# Patient Record
Sex: MALE | Race: WHITE | Employment: OTHER | URBAN - METROPOLITAN AREA
[De-identification: names, ages, dates, MRNs, and addresses within clinical notes are randomized per-mention and may not be internally consistent; named-entity substitution may affect disease eponyms.]

---

## 2020-10-28 ENCOUNTER — HOSPITAL ENCOUNTER (OUTPATIENT)
Dept: RADIATION ONCOLOGY | Age: 75
Discharge: HOME | End: 2020-10-28
Attending: RADIOLOGY
Payer: MEDICARE

## 2020-10-28 PROBLEM — N40.2 PROSTATE NODULE: Status: ACTIVE | Noted: 2020-10-28

## 2020-10-28 PROBLEM — R97.20 ELEVATED PSA: Status: ACTIVE | Noted: 2020-10-28

## 2021-01-13 ENCOUNTER — HOSPITAL ENCOUNTER (OUTPATIENT)
Dept: RADIATION ONCOLOGY | Age: 76
Discharge: HOME | End: 2021-01-13
Attending: RADIOLOGY
Payer: MEDICARE

## 2021-01-13 VITALS
HEIGHT: 65 IN | TEMPERATURE: 97.1 F | BODY MASS INDEX: 42.15 KG/M2 | OXYGEN SATURATION: 98 % | SYSTOLIC BLOOD PRESSURE: 148 MMHG | WEIGHT: 253 LBS | HEART RATE: 60 BPM | RESPIRATION RATE: 18 BRPM | DIASTOLIC BLOOD PRESSURE: 73 MMHG

## 2021-01-13 DIAGNOSIS — C61 PROSTATE CANCER (HCC): ICD-10-CM

## 2021-01-13 PROCEDURE — 99212 OFFICE O/P EST SF 10 MIN: CPT | Performed by: RADIOLOGY

## 2021-01-13 RX ORDER — AMLODIPINE BESYLATE 5 MG/1
5 TABLET ORAL DAILY
COMMUNITY

## 2021-01-13 RX ORDER — TAMSULOSIN HYDROCHLORIDE 0.4 MG/1
0.4 CAPSULE ORAL DAILY
COMMUNITY

## 2021-01-13 RX ORDER — LOSARTAN POTASSIUM 50 MG/1
50 TABLET ORAL 2 TIMES DAILY
COMMUNITY

## 2021-01-13 RX ORDER — NITROGLYCERIN 0.4 MG/1
0.4 TABLET SUBLINGUAL EVERY 5 MIN PRN
COMMUNITY

## 2021-01-13 RX ORDER — LEVOTHYROXINE SODIUM 0.05 MG/1
50 TABLET ORAL DAILY
COMMUNITY

## 2021-01-13 RX ORDER — PRAVASTATIN SODIUM 40 MG
40 TABLET ORAL NIGHTLY
COMMUNITY

## 2021-01-13 RX ORDER — ASPIRIN 81 MG/1
81 TABLET ORAL DAILY
COMMUNITY

## 2021-01-13 NOTE — CONSULTS
NURSING ASSESSMENT     Date: 1/13/2021        Patient Name: Artie Ness     YOB: 1945      Age:  77 y.o. MRN: [id number]     Chaperone [x] Yes   [] No      Advance Directives:   Do you currently have completed advance directives (living will)? [x] Yes   [] No         *If yes, please bring us a copy for your records. *If no, would you like info or assistance in completing advance directives (living will)? [] Yes   [x] No    Pain Score:   Pain Score (1-10): 2  Pain Location: low back pain  Pain Duration: intermittent   Pain Management/Control: None      Is pain affecting your ability to take care of yourself or move throughout your home? [] Yes   [x] No    General: Fatigue  Patient has gained weight [] Yes   [x] No  Patient has lost weight [] Yes   [x] No  How much weight in pounds and over what length of time:     Eyes (Ophthalmic):Wears glasses     Skin (Dermatological):Dermatitis      ENT: Occasionally \"chokes\" on water     Respiratory:Occasional SOB     Cardiovascular: Palpitations, Edema and Cardiac Device      Device   [x] Yes   [] No   Copy of Card Obtained [x] Yes   [] No    Gastrointestinal: Constipation on occasion. Diarrhea when axious    Genito-Urinary:   See IPSS shee    Breast: No Problems     Musculoskeletal: Joint Pain and Back Pain    Neurological:Finger and hand numbness at night occasional. OCD      Hematological and Lymphatic: No Problems     Endocrine: Thyroid Problems        A 10-point review of systems has been conducted and pertinent positives have been   recorded.  All other review of systems are negative        Additional Comments:

## 2021-01-13 NOTE — H&P
after the consult. He expressed no such thoughts to me, and appeared to be in good spirits. No concrete plans. Past Medical History:   Diagnosis Date    [facility] [address]     CAD (coronary artery disease)     Cancer ([facility] [address]     prostate    Hyperlipidemia     Hypertension     Hypothyroidism     Sleep apnea        Past Surgical History:   Procedure Laterality Date    COLONOSCOPY      CORONARY ANGIOPLASTY WITH STENT PLACEMENT      EYE SURGERY      Bilat cataract removal and implants. Retina tear. Laser surgery    HERNIA REPAIR      \"Double\" inguinal    PACEMAKER INSERTION  09/11/2011    Replacement 7/2020    POLYPECTOMY      SKIN CANCER EXCISION         Prior to Admission medications    Medication Sig Start Date End Date Taking? Authorizing Provider   levothyroxine (SYNTHROID) 50 MCG tablet Take 50 mcg by mouth Daily   Yes Historical Provider, MD   metoprolol tartrate (LOPRESSOR) 25 MG tablet Take 25 mg by mouth 2 times daily   Yes Historical Provider, MD   losartan (COZAAR) 50 MG tablet Take 50 mg by mouth 2 times daily   Yes Historical Provider, MD   pravastatin (PRAVACHOL) 40 MG tablet Take 40 mg by mouth nightly   Yes Historical Provider, MD   tamsulosin (FLOMAX) 0.4 MG capsule Take 0.4 mg by mouth daily   Yes Historical Provider, MD   aspirin 81 MG EC tablet Take 81 mg by mouth daily   Yes Historical Provider, MD   amLODIPine (NORVASC) 5 MG tablet Take 5 mg by mouth daily   Yes Historical Provider, MD   nitroGLYCERIN (NITROSTAT) 0.4 MG SL tablet Place 0.4 mg under the tongue every 5 minutes as needed for Chest pain up to max of 3 total doses. If no relief after 1 dose, call 911.     Historical Provider, MD       Allergies   Allergen Reactions    Pine Tar     Seasonal     Dye [Barium-Containing Compounds] Rash       Social History     Tobacco Use   Smoking Status Former Smoker   Smokeless Tobacco Never Used     Social History     Substance and Sexual Activity   Alcohol Use Not Currently History reviewed. No pertinent family history. Review Of Systems:   Pain Score:   Pain Score (1-10): 2  Pain Location: low back pain  Pain Duration: intermittent   Pain Management/Control: None     Is pain affecting your ability to take care of yourself or move throughout your home? []? Yes   [x]? No  General: Fatigue  Patient has gained weight []? Yes   [x]? No  Patient has lost weight []? Yes   [x]? No  How much weight in pounds and over what length of time:    Eyes (Ophthalmic):Wears glasses              Skin (Dermatological):Dermatitis               ENT: Occasionally \"chokes\" on water              Respiratory:Occasional SOB              Cardiovascular: Palpitations, Edema and Cardiac Device                          Device   [x]? Yes   []? No              Copy of Card Obtained [x]? Yes   []? No   Gastrointestinal: Constipation on occasion. Diarrhea when axious   Genito-Urinary:              IPSS = 15, with nocturia x 2, symptoms of incomplete emptying and weak stream.  No dysuria or hematuria. He is not happy with his level of urinary symptoms.  No history of UTI or prostatitis. Breast: No Problems              Musculoskeletal: Joint Pain and Back Pain, longstanding low back pain, attributed to arthritis  Neurological:Finger and hand numbness at night occasional. OCD                          Hematological and Lymphatic: No Problems              Endocrine: Thyroid Problems  A 10-point review of systems has been conducted and pertinent positives have been   recorded. All other review of systems are negative. Physical Exam:  Vitals:    01/13/21 1057   BP: (!) 148/73   Pulse: 60   Resp: 18   Temp: 97.1 °F (36.2 °C)   SpO2: 98%   Weight: 253 lb (114.8 kg)   Height: 5' 5\" (1.651 m)     ECOG PS: 0  GENERAL: NAD, appears stated age. Alert and oriented, appears stated age. HEENT: PERRLA, EOMI. Oral cavity WNL, no visible lesion, normal palate elevation.    NECK: No palpable cervical or supraclavicular adenopathy. RESPIRATORY/LUNGS: Clear to auscultation. No rib or spine tenderness. In particular the posterior ribs, T12, and L5 were all nontender. No CVA tenderness. CARDIOVASCULAR/HEART: Distant regular rate and rhythm. No audible murmur. Normal palpable pulses. ABDOMEN/GASTROINTESTINAL: Soft, nontender, nondistended, normal bowel sounds. No organomegaly. No surgical scars. EXTREMETIES: No cyanosis, clubbing, or +1 pretibial edema  NEUROLOGICAL: No focal deficit, normal gait, Romberg negative. RECTAL: Actively small prostate, 2.5 cm in width, palpable right apical nodule. T2c by MRI. No other rectal mass, no blood in examination glove, normal sphincter tone. Assessment/Plan:  67-year-old male with high risk prostate cancer, with initial PSA of 7.9, Nan score 4+4=8 without PNI, involving 8/13 cores, with most of the positive biopsies coming from MRI directed fusion biopsies. Clinical stage is [id number], as the work-up remains incomplete. The CT and bone scans show some suspicion for metastatic disease, which given the rapid doubling rate of the PSA and the high Nan score is definitely a possibility. I have ordered an [address]68 [facility] PET/CT to be done at [facility] to assess these lesions. I will see the patient back after the scan. With the expectation that the imaging will be negative, we discussed external beam radiotherapy plus or minus seed implant brachytherapy. The patient's urinary symptoms are such that I would not want to do the implant prior to external beam RT, unless his symptoms improved considerably on androgen ablation. Should the scan come back negative, we will place the patient on Casodex and Lupron and then evaluate him several months later for consideration of a brachytherapy boost.  In the context of brachytherapy we discussed radiation safety issues and the logistics of the procedure, as well as risks and benefits of external beam RT and brachytherapy. Intent of treatment, potential risks benefits and side effects reviewed today. Thank you for this consult and allowing us to participate in the care of this patient. Document produced with the aid of voice recognition software, and errors generated by such software may not all have been detected and corrected.   Electronically signed by Brad Minaya MD on 1/13/21 at 1:40 PM EST

## 2021-01-13 NOTE — PROGRESS NOTES
POLY referred to speak to Pt via RN Aguilar due to high distress score and statement of suicidal thoughts he experienced last evening; making this comment while sis-in-law, Farrah Nieves was present during RN assessment. Pt resides alone in a senior living apt. Complex in [place]. He is here with his sis-in-law, Farrah Nieves, who supports and accompanies him to his medical ramsey'ts. Farrah Nieves and Pt's brother (Elisa's spouse) are his primary support family. He has two adult daughters (one resides in an adult care home due to brain damage at birth. The other daughter lives locally with her family with minimal contacts with Pt). He states had \"visualized stabbing himself with a knife\" last evening; stating had been feeling \"depressed\" and \"the thought just crossed my mind\". He stated this thought was \"fleeting\"; moments later felt fine. He states he \"could never\" kill himself, stating he is \"basically a coward\". He states had and has no plan to commit suicide. He states had been a [employer] client from his early twenties until approx age of 62. He states it \"always felt good to talk to someone\"; however when medications were suggested for his \"OCD or depression\" issues, he declined, feeling the counseling sessions were more productive. POLY suggested a reconnection with the [employer] or a counseling service; he stated did not want to proceed with this contact at this time. POLY offered the counseling services of co-worker, Vinod Ingram; stating her expertise/credentials with mental health issues, to which he is agreeable. As pt does not have a working phone at this time (hopes to remedy this situation soon by changing phone carriers) he has agreed to contact Anatricardo Patel when he is at brother's home where he can place the call. POLY provided Pt with POLY Gonzalez contact information and work hours. POLY provided Pt with [address].  At end of this 1:1 session, when he rejoined his  Sis-in-law, POLY informed Chay De La Fuente of Crisis Hotline #' provided and Pt's intent to connect with Rosa Tello via phone from her home. Pt states his neighbors are supportive and he does feel he can call his brother's home for support at any time. No further needs presently.

## 2021-01-14 NOTE — PROGRESS NOTES
well as [facility] satellite contact for [facility] [employer]). Pt stated much appreciation for these resources. No further needs ast this time.

## 2021-01-15 ENCOUNTER — SOCIAL WORK (OUTPATIENT)
Dept: RADIATION ONCOLOGY | Age: 76
End: 2021-01-15

## 2021-01-15 NOTE — PROGRESS NOTES
Pt was seen by SW today, when he arrived at [facility] unscheduled, and asked to speak with SW about a billing concern regarding his surgery. As SW advised him how to proceed with this, pt spontaneously shared that he is feeling depressed and alone on the weekends, \"they are very dark,\" and he is feeling more isolated since the Covid-19 pandemic has limited social opportunities within his apartment complex. He further stated that since sharing with staff on 1/13/21 that he has pictured himself pushing a  knife through his chest, he has been entertaining thoughts that \"I never thought I could do it, but now I think, hmmm, maybe I could. \"  It is noteworthy that pt decided to come in to the cancer center, before a long weekend, to share these concerns with this SW, stating, \"I know whenever you tell a mental health person something like this, they get worried. \"  He also shared a story of having made similar comments years ago at another physician's office, \"and they wouldn't let me go either. \"  Pt seems aware of the consequences of such statements. These facts are concerning in that pt seems to be attempting to alert staff that he feels at risk, and this may be a veiled attempt to ask for help. SW related these concerns to pt and informed that he would need to be evaluated further before leaving today. Pt emphatically stated that he does not wish to go the the ED, and he made clear that he would not go willingly. As such, SW informed that the [facility] unit would be used. Again, pt wished to decline, but SW informed him that police might be called to best protect his safety until he could have a full MH assessment, and pt then willingly agreed to stay for evaluation. SW contacted [employer] unit and informed them of above mentioned risk factors. They agreed that assessment was warranted and SAL Ya conducted the assessment via tele-health. Additionally, as pt has a long history with [place], she also conducted a chart review of past records. He reportedly has no past history of psychiatric hospitalization nor previous suicidal attempts. Pt verbally consented to the assessment and he verbally consented to have this SW present, as [facility] would be to have him on a 1:1 precaution. Pt presented as open and honest during the interview. He candidly spoke of suicidal ideals, but maintained that prohibitions for the act were too strong. He discussed a strong jackson and daily prayer, which would not allow him to hurt himself, and he also discussed from past knowledge of suicides how much loved ones are harmed by the act. He maintains a future orientation, being able to cite short and long term goals, and even maintained his commitment to engaging in supportive therapy with this SW as scheduled next week. He has a detailed plan of participating in his cancer treatment (self-preservation behavior) and care, and he was even able to express some hopeful thoughts about his cancer diagnosis. He also seems motivated to seek help when needed, and he is well aware of crisis hotlines which he may use.

## 2021-01-22 ENCOUNTER — HOSPITAL ENCOUNTER (OUTPATIENT)
Dept: RADIATION ONCOLOGY | Age: 76
Discharge: HOME | End: 2021-01-22
Attending: RADIOLOGY
Payer: MEDICARE

## 2021-01-22 VITALS
WEIGHT: 251.8 LBS | BODY MASS INDEX: 41.9 KG/M2 | RESPIRATION RATE: 18 BRPM | DIASTOLIC BLOOD PRESSURE: 79 MMHG | OXYGEN SATURATION: 98 % | TEMPERATURE: 97.4 F | HEART RATE: 60 BPM | SYSTOLIC BLOOD PRESSURE: 164 MMHG

## 2021-01-22 DIAGNOSIS — C61 PROSTATE CANCER (HCC): Primary | ICD-10-CM

## 2021-01-22 PROCEDURE — 99212 OFFICE O/P EST SF 10 MIN: CPT | Performed by: RADIOLOGY

## 2021-01-22 RX ORDER — BICALUTAMIDE 50 MG/1
50 TABLET, FILM COATED ORAL DAILY
Qty: 120 TABLET | Refills: 0 | Status: SHIPPED | OUTPATIENT
Start: 2021-01-22 | End: 2021-05-22

## 2021-01-22 NOTE — PROGRESS NOTES
SW met with pt today as scheduled following his unannounced visit and psych evaluation with this SW and subsequently, the [facility], last week. He presented today as more engaging and somewhat less anxious, despite knowledge that he will be receiving PET scan results from Dr. Eve Amador today. Pt stated he did contact the [facility] last weekend, to check in with them as they requested, and he has since received a letter from them offering services. Discussed with him the benefit of seeking this support, and the value of being engaged with the [facility] in general.  Pt states he will think it over, but he may prefer to receive support more geared with his cancer treatment, so he will consider options. Sadly, pt said he has learned that his developmentally disabled daughter has tested positive for COVID and because of the severity of her disability, he feels very worried for her well-being. He is saddened by the fact that he has been unable to see her for the past year because of COVID restrictions at her facility. He is hoping to be able to visit her again in the future. Pt did report having a peaceful weekend, last weekend. He watched football as planned, and went over to visit his brother, which was helpful. Additionally, he reports that he seems to have some resolution on his most recent [facility] bill, it may have been filed under the wrong Medicare, so he is hoping that his bill will be covered. Lastly, pt had his younger daughter, Joy Dang, come later into the appointment, and she was planning to attend the visit with Dr. Eve Amador. Pt is clearly very open in his relationship with Joy Dang, as she was well aware of our meeting last week, and she is highly encouraging of her father to seek support. Finally, following his visit with Dr. Eve Amador, pt and Joy Dang, met briefly with POLY.   They were grateful to have received news that there was no evidence of metastasis on the PET scan, and pt seems well aware and willing to pursue his treatment plan forward. He stated he would like to meet with this SW again, and appointment was set for February 5, 2021. Pt invited to contact SW should he have need sooner.

## 2021-01-22 NOTE — PROGRESS NOTES
NURSING ASSESSMENT     Date: 1/22/2021        Patient Name: Ari Neil     YOB: 1945      Age:  77 y.o. MRN: [id number]     Chaperone [x] Yes   [] No      Advance Directives:  Do you currently have completed advance directives (living will)? [x] Yes   [] No         *If yes, please bring us a copy for your records. *If no, would you like info or assistance in completing advance directives (living will)? [] Yes   [x] No    Pain Score:   Pain Score (1-10): 2  Pain Location:Lower back   Pain Duration: Chronic  Pain Management/Control: May take Tylenol as needed. Is pain affecting your ability to take care of yourself or move throughout your home? [] Yes   [x] No    General: No Problems  Patient has gained weight [] Yes   [x] No  Patient has lost weight [] Yes   [x] No  How much weight in pounds and over what length of time:     Eyes (Ophthalmic):Wears glasses     Skin (Dermatological): dermatitis     ENT: Occasionally \"chokes\" on water     Respiratory: Occasional SOB     Cardiovascular: Palpitations, Cardiac Device      Device   [x] Yes   [] No   Copy of Card Obtained [x] Yes   [] No    Gastrointestinal: Constipation on occasion. Diarrhea on occasion when anxious    Genito-Urinary: See recent IPPS sheet       Breast: No Problems     Musculoskeletal: Joint Pain and Back Pain    Neurological: Finger and hand numbness at night occasionally. OCD      Hematological and Lymphatic: No Problems     Endocrine: Thyroid Problems        A 10-point review of systems has been conducted and pertinent positives have been   recorded. All other review of systems are negative    Was the patient admitted during the course of treatment OR within 30 days of treatment?  No        Additional Comments:

## 2021-01-22 NOTE — ONCOLOGY
RADIATION ONCOLOGY FOLLOW-UP    DATE OF VISIT: 2021    DIAGNOSIS & STAGIN-year-old male with high risk prostate cancer, with initial PSA of 7.9, Nan score 4+4=8 without PNI, involving 8/13 cores, with most of the positive biopsies coming from MRI directed fusion biopsies. Clinical stage is T2cN0. PREVIOUS TREATMENT: none    TIME SINCE TREATMENT: N/A    INTERVAL HISTORY: Due to the patient's equivocal CT and bone scan imaging, I ordered an Axumin PET/CT, done at [facility]. I have seen the imaging reports. This shows no evidence of metastases, with activity involving the right prostatic mid gland and apex. This agrees with the positive high-grade biopsies. I informed the patient of these results. His preference is to proceed with treatment that we discussed previously, which includes androgen ablation, seed implant brachytherapy boost, and external beam RT. He has not yet started androgen ablation. PAST MEDICAL HISTORY:   Past Medical History:   Diagnosis Date    [facility] [address]     CAD (coronary artery disease)     Cancer ([facility] [address]     prostate    Hyperlipidemia     Hypertension     Hypothyroidism     Sleep apnea        PAST SURGICAL HISTORY:  Past Surgical History:   Procedure Laterality Date    COLONOSCOPY      CORONARY ANGIOPLASTY WITH STENT PLACEMENT      EYE SURGERY      Bilat cataract removal and implants. Retina tear.  Laser surgery    HERNIA REPAIR      \"Double\" inguinal    PACEMAKER INSERTION  2011    Replacement 2020    POLYPECTOMY      SKIN CANCER EXCISION          Social History     Tobacco Use   Smoking Status Former Smoker   Smokeless Tobacco Never Used     Social History     Substance and Sexual Activity   Alcohol Use Not Currently       ALLERGIES:   Allergies   Allergen Reactions    Pine Tar     Seasonal     Dye [Barium-Containing Compounds] Rash        CURRENT MEDICATIONS:     Prior to Admission medications    Medication Sig Start Date End Date Taking? Authorizing Provider   bicalutamide (CASODEX) 50 MG chemo tablet Take 1 tablet by mouth daily 1/22/21 5/22/21 Yes Chiqui Weiss MD   levothyroxine (SYNTHROID) 50 MCG tablet Take 50 mcg by mouth Daily   Yes Historical Provider, MD   metoprolol tartrate (LOPRESSOR) 25 MG tablet Take 25 mg by mouth 2 times daily   Yes Historical Provider, MD   losartan (COZAAR) 50 MG tablet Take 50 mg by mouth 2 times daily   Yes Historical Provider, MD   pravastatin (PRAVACHOL) 40 MG tablet Take 40 mg by mouth nightly   Yes Historical Provider, MD   tamsulosin (FLOMAX) 0.4 MG capsule Take 0.4 mg by mouth daily   Yes Historical Provider, MD   aspirin 81 MG EC tablet Take 81 mg by mouth daily   Yes Historical Provider, MD   amLODIPine (NORVASC) 5 MG tablet Take 5 mg by mouth daily   Yes Historical Provider, MD   nitroGLYCERIN (NITROSTAT) 0.4 MG SL tablet Place 0.4 mg under the tongue every 5 minutes as needed for Chest pain up to max of 3 total doses. If no relief after 1 dose, call 911. Historical Provider, MD     I have personally reviewed and reconciled the medications listed. Review Of Systems:   Pain Score:   Pain Score (1-10): 2  Pain Location:Lower back   Pain Duration: Chronic  Pain Management/Control: May take Tylenol as needed. Is pain affecting your ability to take care of yourself or move throughout your home? []? Yes   [x]? No  General: No Problems  Patient has gained weight []? Yes   [x]? No  Patient has lost weight []? Yes   [x]? No  How much weight in pounds and over what length of time:    Eyes (Ophthalmic):Wears glasses              Skin (Dermatological): dermatitis              ENT: Occasionally \"chokes\" on water              Respiratory: Occasional SOB              Cardiovascular: Palpitations, Cardiac Device                          Device   [x]? Yes   []? No              Copy of Card Obtained [x]? Yes   []? No   Gastrointestinal: Constipation on occasion.  Diarrhea on occasion been detected and corrected. Electronically signed by Geovani Tineo MD on 1/22/21 at 4:08 PM EST      Thank you for allowing us to participate in the care of this patient.   cc:   MD Luis Puentes MD Ivar Coward, MD  [facility].  [id number]  [address],  [address]

## 2021-02-05 ENCOUNTER — SOCIAL WORK (OUTPATIENT)
Dept: RADIATION ONCOLOGY | Age: 76
End: 2021-02-05

## 2021-02-05 NOTE — PROGRESS NOTES
Met with pt for individual supportive therapy session and cancer support. Reviewed with pt his history of past and current supportive relationships, and looked at ways to continue to foster nurturing relationships as he goes through current cancer treatment. He has identified his brother and sister-in-law as primary supports, though he has some comfortable acquaintances in his apartment building upon whom he can depend. Nonetheless, pt identifies as primarily a loner, and he has been most of his life. He states he is most content with this situation. He stated that he finds it helpful to talk, and he expressed his appreciation for this session. \"It feels good just to talk about what is going on. That is what really helps me. \"      Additionally, pt also had some questions about his care between [facility] and [facility] as there is apparently a shortage of Lupron. POLY spoke with Dr. Yovani Ramirez, who then called Dr. Brianna Bean to coordinate care. Pt was informed that Dr. Ying Davis office will contact him shortly for an appointment--though pt plans to stop by that office to schedule in person as he does not use his phone and remains unreachable by it. Next appt with POLY scheduled for March 12, 2021.

## 2021-04-30 ENCOUNTER — NURSE ONLY (OUTPATIENT)
Dept: PRIMARY CARE CLINIC | Age: 76
End: 2021-04-30

## 2023-03-07 LAB
ANION GAP IN SER/PLAS: 15 MMOL/L (ref 10–20)
BASOPHILS (10*3/UL) IN BLOOD BY AUTOMATED COUNT: 0.1 X10E9/L (ref 0–0.1)
BASOPHILS/100 LEUKOCYTES IN BLOOD BY AUTOMATED COUNT: 1.6 % (ref 0–2)
CALCIUM (MG/DL) IN SER/PLAS: 8.9 MG/DL (ref 8.6–10.3)
CARBON DIOXIDE, TOTAL (MMOL/L) IN SER/PLAS: 24 MMOL/L (ref 21–32)
CHLORIDE (MMOL/L) IN SER/PLAS: 104 MMOL/L (ref 98–107)
CREATININE (MG/DL) IN SER/PLAS: 1.18 MG/DL (ref 0.5–1.3)
EOSINOPHILS (10*3/UL) IN BLOOD BY AUTOMATED COUNT: 0.25 X10E9/L (ref 0–0.4)
EOSINOPHILS/100 LEUKOCYTES IN BLOOD BY AUTOMATED COUNT: 4 % (ref 0–6)
ERYTHROCYTE DISTRIBUTION WIDTH (RATIO) BY AUTOMATED COUNT: 13.2 % (ref 11.5–14.5)
ERYTHROCYTE MEAN CORPUSCULAR HEMOGLOBIN CONCENTRATION (G/DL) BY AUTOMATED: 32.5 G/DL (ref 32–36)
ERYTHROCYTE MEAN CORPUSCULAR VOLUME (FL) BY AUTOMATED COUNT: 88 FL (ref 80–100)
ERYTHROCYTES (10*6/UL) IN BLOOD BY AUTOMATED COUNT: 4.3 X10E12/L (ref 4.5–5.9)
GFR MALE: 64 ML/MIN/1.73M2
GLUCOSE (MG/DL) IN SER/PLAS: 109 MG/DL (ref 74–99)
HEMATOCRIT (%) IN BLOOD BY AUTOMATED COUNT: 37.9 % (ref 41–52)
HEMOGLOBIN (G/DL) IN BLOOD: 12.3 G/DL (ref 13.5–17.5)
IMMATURE GRANULOCYTES/100 LEUKOCYTES IN BLOOD BY AUTOMATED COUNT: 1.1 % (ref 0–0.9)
LEUKOCYTES (10*3/UL) IN BLOOD BY AUTOMATED COUNT: 6.3 X10E9/L (ref 4.4–11.3)
LYMPHOCYTES (10*3/UL) IN BLOOD BY AUTOMATED COUNT: 1.06 X10E9/L (ref 0.8–3)
LYMPHOCYTES/100 LEUKOCYTES IN BLOOD BY AUTOMATED COUNT: 16.9 % (ref 13–44)
MONOCYTES (10*3/UL) IN BLOOD BY AUTOMATED COUNT: 0.84 X10E9/L (ref 0.05–0.8)
MONOCYTES/100 LEUKOCYTES IN BLOOD BY AUTOMATED COUNT: 13.4 % (ref 2–10)
NEUTROPHILS (10*3/UL) IN BLOOD BY AUTOMATED COUNT: 3.97 X10E9/L (ref 1.6–5.5)
NEUTROPHILS/100 LEUKOCYTES IN BLOOD BY AUTOMATED COUNT: 63 % (ref 40–80)
PLATELETS (10*3/UL) IN BLOOD AUTOMATED COUNT: 205 X10E9/L (ref 150–450)
POTASSIUM (MMOL/L) IN SER/PLAS: 3.9 MMOL/L (ref 3.5–5.3)
SODIUM (MMOL/L) IN SER/PLAS: 139 MMOL/L (ref 136–145)
THYROTROPIN (MIU/L) IN SER/PLAS BY DETECTION LIMIT <= 0.05 MIU/L: 4.46 MIU/L (ref 0.44–3.98)
UREA NITROGEN (MG/DL) IN SER/PLAS: 13 MG/DL (ref 6–23)

## 2023-03-09 LAB
ESTIMATED AVERAGE GLUCOSE FOR HBA1C: 140 MG/DL
HEMOGLOBIN A1C/HEMOGLOBIN TOTAL IN BLOOD: 6.5 %

## 2023-05-30 LAB
ANION GAP IN SER/PLAS: 14 MMOL/L (ref 10–20)
CALCIUM (MG/DL) IN SER/PLAS: 9.1 MG/DL (ref 8.6–10.3)
CARBON DIOXIDE, TOTAL (MMOL/L) IN SER/PLAS: 23 MMOL/L (ref 21–32)
CHLORIDE (MMOL/L) IN SER/PLAS: 104 MMOL/L (ref 98–107)
CREATININE (MG/DL) IN SER/PLAS: 1.29 MG/DL (ref 0.5–1.3)
ESTIMATED AVERAGE GLUCOSE FOR HBA1C: 134 MG/DL
GFR MALE: 57 ML/MIN/1.73M2
GLUCOSE (MG/DL) IN SER/PLAS: 101 MG/DL (ref 74–99)
HEMOGLOBIN A1C/HEMOGLOBIN TOTAL IN BLOOD: 6.3 %
POTASSIUM (MMOL/L) IN SER/PLAS: 4.2 MMOL/L (ref 3.5–5.3)
SODIUM (MMOL/L) IN SER/PLAS: 137 MMOL/L (ref 136–145)
THYROTROPIN (MIU/L) IN SER/PLAS BY DETECTION LIMIT <= 0.05 MIU/L: 2.94 MIU/L (ref 0.44–3.98)
UREA NITROGEN (MG/DL) IN SER/PLAS: 20 MG/DL (ref 6–23)

## 2023-08-29 PROBLEM — N18.2 CKD STAGE 2 DUE TO TYPE 2 DIABETES MELLITUS (MULTI): Status: ACTIVE | Noted: 2023-08-29

## 2023-08-29 PROBLEM — R06.00 DYSPNEA: Status: ACTIVE | Noted: 2023-08-29

## 2023-08-29 PROBLEM — R42 POSTURAL LIGHTHEADEDNESS: Status: ACTIVE | Noted: 2023-08-29

## 2023-08-29 PROBLEM — Z95.0 CARDIAC PACEMAKER IN SITU: Status: ACTIVE | Noted: 2023-08-29

## 2023-08-29 PROBLEM — E78.2 MIXED HYPERLIPIDEMIA: Status: ACTIVE | Noted: 2023-08-29

## 2023-08-29 PROBLEM — C61 PROSTATE CANCER (MULTI): Status: ACTIVE | Noted: 2023-08-29

## 2023-08-29 PROBLEM — E03.9 HYPOTHYROIDISM: Status: ACTIVE | Noted: 2023-08-29

## 2023-08-29 PROBLEM — R55 NEAR SYNCOPE: Status: ACTIVE | Noted: 2023-08-29

## 2023-08-29 PROBLEM — R07.89 CHEST DISCOMFORT: Status: ACTIVE | Noted: 2023-08-29

## 2023-08-29 PROBLEM — I44.1 ATRIOVENTRICULAR BLOCK, SECOND DEGREE: Status: ACTIVE | Noted: 2023-08-29

## 2023-08-29 PROBLEM — I10 BENIGN ESSENTIAL HYPERTENSION: Status: ACTIVE | Noted: 2023-08-29

## 2023-08-29 PROBLEM — I49.5 SSS (SICK SINUS SYNDROME) (MULTI): Status: ACTIVE | Noted: 2023-08-29

## 2023-08-29 PROBLEM — T82.9XXA DISORDER OF CARDIAC PACEMAKER ELECTRODE: Status: ACTIVE | Noted: 2023-08-29

## 2023-08-29 PROBLEM — G47.30 SLEEP APNEA: Status: ACTIVE | Noted: 2023-08-29

## 2023-08-29 PROBLEM — I48.91 AF (ATRIAL FIBRILLATION) (MULTI): Status: ACTIVE | Noted: 2023-08-29

## 2023-08-29 PROBLEM — I87.2 CHRONIC VENOUS INSUFFICIENCY: Status: ACTIVE | Noted: 2023-08-29

## 2023-08-29 PROBLEM — E11.22 CKD STAGE 2 DUE TO TYPE 2 DIABETES MELLITUS (MULTI): Status: ACTIVE | Noted: 2023-08-29

## 2023-08-29 PROBLEM — I47.20 VENTRICULAR TACHYCARDIA (MULTI): Status: ACTIVE | Noted: 2023-08-29

## 2023-08-29 PROBLEM — Z87.19 HISTORY OF GASTROESOPHAGEAL REFLUX (GERD): Status: ACTIVE | Noted: 2023-08-29

## 2023-08-29 PROBLEM — I20.9 ANGINA, CLASS IV (CMS-HCC): Status: ACTIVE | Noted: 2023-08-29

## 2023-08-29 PROBLEM — R42 VERTIGO: Status: ACTIVE | Noted: 2023-08-29

## 2023-08-29 PROBLEM — I49.8 ATRIAL ARRHYTHMIA: Status: ACTIVE | Noted: 2023-08-29

## 2023-08-29 RX ORDER — METOPROLOL TARTRATE 50 MG/1
1 TABLET ORAL 2 TIMES DAILY
COMMUNITY
End: 2023-10-06 | Stop reason: SDUPTHER

## 2023-08-29 RX ORDER — PRAVASTATIN SODIUM 40 MG/1
1 TABLET ORAL DAILY
COMMUNITY
Start: 2022-04-21 | End: 2023-10-06 | Stop reason: SDUPTHER

## 2023-08-29 RX ORDER — TAMSULOSIN HYDROCHLORIDE 0.4 MG/1
1 CAPSULE ORAL DAILY
COMMUNITY

## 2023-08-29 RX ORDER — LEVOTHYROXINE SODIUM 25 UG/1
CAPSULE ORAL
COMMUNITY
End: 2024-04-08 | Stop reason: ALTCHOICE

## 2023-08-29 RX ORDER — LOSARTAN POTASSIUM 50 MG/1
1 TABLET ORAL 2 TIMES DAILY
COMMUNITY
End: 2023-10-06 | Stop reason: SDUPTHER

## 2023-08-29 RX ORDER — NITROGLYCERIN 0.4 MG/1
TABLET SUBLINGUAL
COMMUNITY
End: 2024-04-08 | Stop reason: SDUPTHER

## 2023-08-29 RX ORDER — LEVOTHYROXINE SODIUM 50 UG/1
1 TABLET ORAL DAILY
COMMUNITY

## 2023-08-29 RX ORDER — NAPROXEN SODIUM 220 MG/1
1 TABLET, FILM COATED ORAL DAILY
COMMUNITY

## 2023-08-29 RX ORDER — AMLODIPINE BESYLATE 5 MG/1
1 TABLET ORAL DAILY
COMMUNITY
Start: 2022-07-25

## 2023-08-29 RX ORDER — ALBUTEROL SULFATE 90 UG/1
AEROSOL, METERED RESPIRATORY (INHALATION)
COMMUNITY
Start: 2021-12-11

## 2023-09-19 LAB
ANION GAP IN SER/PLAS: 12 MMOL/L (ref 10–20)
APPEARANCE, URINE: CLEAR
BILIRUBIN, URINE: NEGATIVE
BLOOD, URINE: NEGATIVE
CALCIUM (MG/DL) IN SER/PLAS: 9.4 MG/DL (ref 8.6–10.3)
CARBON DIOXIDE, TOTAL (MMOL/L) IN SER/PLAS: 26 MMOL/L (ref 21–32)
CHLORIDE (MMOL/L) IN SER/PLAS: 105 MMOL/L (ref 98–107)
COLOR, URINE: YELLOW
CREATININE (MG/DL) IN SER/PLAS: 1.39 MG/DL (ref 0.5–1.3)
ERYTHROCYTE DISTRIBUTION WIDTH (RATIO) BY AUTOMATED COUNT: 13 % (ref 11.5–14.5)
ERYTHROCYTE MEAN CORPUSCULAR HEMOGLOBIN CONCENTRATION (G/DL) BY AUTOMATED: 32.9 G/DL (ref 32–36)
ERYTHROCYTE MEAN CORPUSCULAR VOLUME (FL) BY AUTOMATED COUNT: 87 FL (ref 80–100)
ERYTHROCYTES (10*6/UL) IN BLOOD BY AUTOMATED COUNT: 4.88 X10E12/L (ref 4.5–5.9)
GFR MALE: 52 ML/MIN/1.73M2
GLUCOSE (MG/DL) IN SER/PLAS: 88 MG/DL (ref 74–99)
GLUCOSE, URINE: NEGATIVE MG/DL
HEMATOCRIT (%) IN BLOOD BY AUTOMATED COUNT: 42.3 % (ref 41–52)
HEMOGLOBIN (G/DL) IN BLOOD: 13.9 G/DL (ref 13.5–17.5)
INR IN PPP BY COAGULATION ASSAY: 1 (ref 0.9–1.1)
KETONES, URINE: NEGATIVE MG/DL
LEUKOCYTE ESTERASE, URINE: NEGATIVE
LEUKOCYTES (10*3/UL) IN BLOOD BY AUTOMATED COUNT: 7.6 X10E9/L (ref 4.4–11.3)
NITRITE, URINE: NEGATIVE
PH, URINE: 6 (ref 5–8)
PLATELETS (10*3/UL) IN BLOOD AUTOMATED COUNT: 210 X10E9/L (ref 150–450)
POTASSIUM (MMOL/L) IN SER/PLAS: 4.4 MMOL/L (ref 3.5–5.3)
PROSTATE SPECIFIC AG (NG/ML) IN SER/PLAS: 0.08 NG/ML (ref 0–4)
PROTEIN, URINE: NEGATIVE MG/DL
PROTHROMBIN TIME (PT) IN PPP BY COAGULATION ASSAY: 10.9 SEC (ref 9.8–12.8)
SODIUM (MMOL/L) IN SER/PLAS: 139 MMOL/L (ref 136–145)
SPECIFIC GRAVITY, URINE: 1.02 (ref 1–1.03)
UREA NITROGEN (MG/DL) IN SER/PLAS: 17 MG/DL (ref 6–23)
UROBILINOGEN, URINE: <2 MG/DL (ref 0–1.9)

## 2023-09-20 LAB — URINE CULTURE: NORMAL

## 2023-10-04 ENCOUNTER — HOSPITAL ENCOUNTER (OUTPATIENT)
Dept: RADIOLOGY | Facility: HOSPITAL | Age: 78
Discharge: HOME | End: 2023-10-04
Payer: MEDICARE

## 2023-10-04 DIAGNOSIS — K62.89 OTHER SPECIFIED DISEASES OF ANUS AND RECTUM: ICD-10-CM

## 2023-10-04 PROCEDURE — 74176 CT ABD & PELVIS W/O CONTRAST: CPT

## 2023-10-06 ENCOUNTER — OFFICE VISIT (OUTPATIENT)
Dept: CARDIOLOGY | Facility: CLINIC | Age: 78
End: 2023-10-06
Payer: MEDICARE

## 2023-10-06 ENCOUNTER — HOSPITAL ENCOUNTER (OUTPATIENT)
Dept: CARDIOLOGY | Facility: HOSPITAL | Age: 78
Discharge: HOME | End: 2023-10-06
Payer: MEDICARE

## 2023-10-06 VITALS
HEART RATE: 67 BPM | DIASTOLIC BLOOD PRESSURE: 64 MMHG | HEIGHT: 64 IN | BODY MASS INDEX: 46.78 KG/M2 | WEIGHT: 274 LBS | SYSTOLIC BLOOD PRESSURE: 136 MMHG

## 2023-10-06 DIAGNOSIS — I10 ESSENTIAL HYPERTENSION: ICD-10-CM

## 2023-10-06 DIAGNOSIS — I48.0 PAROXYSMAL ATRIAL FIBRILLATION (MULTI): Primary | ICD-10-CM

## 2023-10-06 DIAGNOSIS — I44.1 ATRIOVENTRICULAR BLOCK, SECOND DEGREE: ICD-10-CM

## 2023-10-06 DIAGNOSIS — I47.20 VENTRICULAR TACHYCARDIA (MULTI): ICD-10-CM

## 2023-10-06 DIAGNOSIS — I49.8 ATRIAL ARRHYTHMIA: ICD-10-CM

## 2023-10-06 DIAGNOSIS — Z95.0 CARDIAC PACEMAKER IN SITU: ICD-10-CM

## 2023-10-06 DIAGNOSIS — T82.9XXD: ICD-10-CM

## 2023-10-06 DIAGNOSIS — E78.2 MIXED HYPERLIPIDEMIA: ICD-10-CM

## 2023-10-06 DIAGNOSIS — I44.2 ATRIOVENTRICULAR BLOCK, COMPLETE (MULTI): ICD-10-CM

## 2023-10-06 DIAGNOSIS — R55 SYNCOPE DUE TO SICK SINUS SYNDROME (MULTI): ICD-10-CM

## 2023-10-06 DIAGNOSIS — I49.5 SYNCOPE DUE TO SICK SINUS SYNDROME (MULTI): ICD-10-CM

## 2023-10-06 DIAGNOSIS — I49.5 SSS (SICK SINUS SYNDROME) (MULTI): ICD-10-CM

## 2023-10-06 PROCEDURE — 93290 INTERROG DEV EVAL ICPMS IP: CPT | Performed by: INTERNAL MEDICINE

## 2023-10-06 PROCEDURE — 99214 OFFICE O/P EST MOD 30 MIN: CPT | Performed by: INTERNAL MEDICINE

## 2023-10-06 PROCEDURE — 3078F DIAST BP <80 MM HG: CPT | Performed by: INTERNAL MEDICINE

## 2023-10-06 PROCEDURE — 1036F TOBACCO NON-USER: CPT | Performed by: INTERNAL MEDICINE

## 2023-10-06 PROCEDURE — 93000 ELECTROCARDIOGRAM COMPLETE: CPT | Performed by: INTERNAL MEDICINE

## 2023-10-06 PROCEDURE — 3075F SYST BP GE 130 - 139MM HG: CPT | Performed by: INTERNAL MEDICINE

## 2023-10-06 PROCEDURE — 1159F MED LIST DOCD IN RCRD: CPT | Performed by: INTERNAL MEDICINE

## 2023-10-06 PROCEDURE — 93280 PM DEVICE PROGR EVAL DUAL: CPT

## 2023-10-06 PROCEDURE — 93280 PM DEVICE PROGR EVAL DUAL: CPT | Performed by: INTERNAL MEDICINE

## 2023-10-06 RX ORDER — LOSARTAN POTASSIUM 50 MG/1
50 TABLET ORAL 2 TIMES DAILY
Qty: 180 TABLET | Refills: 3 | Status: SHIPPED | OUTPATIENT
Start: 2023-10-06

## 2023-10-06 RX ORDER — METOPROLOL TARTRATE 50 MG/1
50 TABLET ORAL 2 TIMES DAILY
Qty: 180 TABLET | Refills: 3 | Status: SHIPPED | OUTPATIENT
Start: 2023-10-06

## 2023-10-06 RX ORDER — PRAVASTATIN SODIUM 40 MG/1
40 TABLET ORAL DAILY
Qty: 90 TABLET | Refills: 3 | Status: SHIPPED | OUTPATIENT
Start: 2023-10-06 | End: 2024-01-08 | Stop reason: SDUPTHER

## 2023-10-06 RX ORDER — CHOLECALCIFEROL (VITAMIN D3) 125 MCG
125 CAPSULE ORAL DAILY
COMMUNITY

## 2023-10-06 RX ORDER — CHOLECALCIFEROL (VITAMIN D3) 25 MCG
2500 TABLET,CHEWABLE ORAL DAILY
COMMUNITY

## 2023-10-06 NOTE — PROGRESS NOTES
"Chief Complaint:   Atrial Fibrillation     History Of Present Illness:    Sid Farmer is a 78 y.o. male presenting with follow-up of pacemaker.  Denies any arrhythmia symptoms.  He denies any lightheadedness, dizziness, near-syncope or syncope.  His pacemaker site is well-healed.  He denies any redness, drainage, or swelling of the site.       Last Recorded Vitals:  Vitals:    10/06/23 1305   BP: 136/64   Pulse: 67   Weight: 124 kg (274 lb)   Height: 1.626 m (5' 4\")       Past Medical History:  He has a past medical history of History of morbid obesity.    Past Surgical History:  He has a past surgical history that includes Other surgical history (10/28/2021); Other surgical history (10/28/2021); Other surgical history (10/28/2021); Other surgical history (10/28/2021); Other surgical history (10/28/2021); Other surgical history (10/28/2021); Other surgical history (10/28/2021); Other surgical history (10/28/2021); and Other surgical history (10/28/2021).      Social History:  He reports that he has quit smoking. His smoking use included cigarettes. He has never used smokeless tobacco. He reports that he does not currently use alcohol. He reports that he does not currently use drugs.    Family History:  Family History   Problem Relation Name Age of Onset    Other (acute myocardial infarction) Mother      Coronary artery disease Mother      Diabetes Mother      Other (ptca) Mother      Coronary artery disease Father      Other (cva) Sister          Allergies:  Iodinated contrast media, Pine tar, and Barium iodide    Outpatient Medications:  Current Outpatient Medications   Medication Instructions    albuterol 90 mcg/actuation inhaler INHALE 2 (TWO) PUFFSQ FOUR - 6 (SIX) HOURS AS NEEDED    amLODIPine (Norvasc) 5 mg tablet 1 tablet, oral, Daily    aspirin 81 mg chewable tablet 1 tablet, oral, Daily    cholecalciferol (Vitamin D-3) 125 MCG (5000 UT) capsule as directed Orally    cyanocobalamin, vitamin B-12, 2,500 " mcg tablet,chewable as directed Orally    levothyroxine (Synthroid, Levoxyl) 50 mcg tablet 1 tablet, oral, Daily    levothyroxine (Tirosint) 25 mcg capsule Take 1 tablet by mouth on Monday, Wednesday, ad Friday. Take with 50 mcg tablet    losartan (Cozaar) 50 mg tablet 1 tablet, oral, 2 times daily    metoprolol tartrate (Lopressor) 50 mg tablet 1 tablet, oral, 2 times daily    nitroglycerin (Nitrostat) 0.4 mg SL tablet PLACE 1 TABLET UNDER THE TONGUE EVERY 5 MINUTES FOR UP TO 3 DOSES AS NEEDED FOR CHEST PAIN.CALL 911 IF PAIN PERSISTS.    pravastatin (Pravachol) 40 mg tablet 1 tablet, oral, Daily    tamsulosin (Flomax) 0.4 mg 24 hr capsule 1 capsule, oral, Daily     ROS  Constitutional: not feeling tired.   Eyes: no eyesight problems.   ENT: no hearing loss and no nosebleeds.   Cardiovascular: no intermittent leg claudication and as noted in HPI.   Respiratory: no chronic cough, no shortness of breath and as noted in HPI.   Gastrointestinal: no change in bowel habits and no blood in stools.   Genitourinary: no urinary frequency and no hematuria.   Skin: no skin rashes.   Neurological: no seizures and no frequent falls.   Psychiatric: no depression and not suicidal.   All other systems have been reviewed and are negative for complaint.  Physical Exam:  Constitutional: alert and in no acute distress.   Eyes: no erythema, swelling or discharge from the eye .   Neck: neck is supple, symmetric, trachea midline, no masses  and no thyromegaly .   Pulmonary: no increased work of breathing or signs of respiratory distress  and lungs clear to auscultation.    Cardiovascular: carotid pulses 2+ bilaterally with no bruit , JVP was normal, , regular rhythm, normal S1 and S2, no murmurs ,  and no edema  .   Pacemaker/ICD Implant site has healed without signs of infection. Left sided device pocket normal.   Abdomen:  and .   Skin: skin warm and dry, normal skin turgor .   Psychiatric judgment and insight is normal  and oriented to  "person, place and time .           Last Labs:  CBC -  Lab Results   Component Value Date    WBC 7.6 09/19/2023    HGB 13.9 09/19/2023    HCT 42.3 09/19/2023    MCV 87 09/19/2023     09/19/2023       CMP -  Lab Results   Component Value Date    CALCIUM 9.4 09/19/2023    PHOS 3.5 09/20/2021    PROT 6.1 (L) 05/18/2022    ALBUMIN 3.9 05/18/2022    AST 16 05/18/2022    ALT 19 05/18/2022    ALKPHOS 59 05/18/2022    BILITOT 0.5 05/18/2022       LIPID PANEL -   Lab Results   Component Value Date    CHOL 151 05/18/2022    TRIG 237 (H) 05/18/2022    HDL 28.0 (A) 05/18/2022    CHHDL 5.4 (A) 05/18/2022    LDLF 76 05/18/2022    VLDL 47 (H) 05/18/2022    NHDL 123 05/18/2022       RENAL FUNCTION PANEL -   Lab Results   Component Value Date    GLUCOSE 88 09/19/2023     09/19/2023    K 4.4 09/19/2023     09/19/2023    CO2 26 09/19/2023    ANIONGAP 12 09/19/2023    BUN 17 09/19/2023    CREATININE 1.39 (H) 09/19/2023    GFRMALE 52 (A) 09/19/2023    CALCIUM 9.4 09/19/2023    PHOS 3.5 09/20/2021    ALBUMIN 3.9 05/18/2022        Lab Results   Component Value Date    BNP 78 12/11/2021    HGBA1C 6.3 (A) 05/30/2023       Last Cardiology Tests:  ECG:    Today.  Appropriate pacing.  Right axis deviation.  Corrected QT interval 480 ms    Device check today.  [employer] pacemaker.  Estimate longevity device over 5 years.  Noise inversions noted.  Echo:  No results found for this or any previous visit from the past 1095 days.      Ejection Fractions:  No results found for: \"EF\"    Cath:  No results found for this or any previous visit from the past 1095 days.      Stress Test:  Nuclear Stress Test 3/7/2023      Cardiac Imaging:  No results found for this or any previous visit from the past 1095 days.        Lab review: I have personally reviewed the laboratory result(s) see above    Assessment/Plan   Problem List Items Addressed This Visit             ICD-10-CM       Cardiac and Vasculature    AF (atrial fibrillation) " (CMS/[facility]) - Primary I48.91    Relevant Medications    metoprolol tartrate (Lopressor) 50 mg tablet    Other Relevant Orders    Cardiac device check - Remote    ECG 12 lead (Clinic Performed)    Cardiac pacemaker in situ Z95.0    Relevant Orders    Cardiac Device Check - In Clinic    SSS (sick sinus syndrome) (CMS/[facility]) I49.5    Relevant Medications    metoprolol tartrate (Lopressor) 50 mg tablet    Ventricular tachycardia (CMS/[facility]) I47.20    Relevant Medications    metoprolol tartrate (Lopressor) 50 mg tablet    Other Relevant Orders    Cardiac device check - Remote    Atrioventricular block, second degree I44.1    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  Chronotropic incompetence.  Syncope.  Resolved after pacemaker implantation  [employer] pacemaker.  See above scan.  Normal device function.  Reviewed device check in detail with patient.  Ordered device checks.  Hypertension.  Chronic.  Stable.  Reviewed medications.  Refills.  Hyperglycemia.  Patient reports hemoglobin A1c 6.3.  Follows with PCP  Overweight  (Association recommendations and lifestyle modifications reviewed  Counseling greater than 50% of the visit.  I discussed with patient normal conduction, arrhythmia, atrial fibrillation, pacemaker, ventricular lead noise, pacing, treatment options, risk, benefits, imponderables.  All questions answered.  Patient appreciative care.    Beba Calloway MD

## 2023-10-11 ENCOUNTER — OFFICE VISIT (OUTPATIENT)
Dept: SURGERY | Facility: CLINIC | Age: 78
End: 2023-10-11
Payer: MEDICARE

## 2023-10-11 VITALS — DIASTOLIC BLOOD PRESSURE: 80 MMHG | BODY MASS INDEX: 47.55 KG/M2 | SYSTOLIC BLOOD PRESSURE: 144 MMHG | WEIGHT: 277 LBS

## 2023-10-11 DIAGNOSIS — K62.89 ANAL PAIN: Primary | ICD-10-CM

## 2023-10-11 PROCEDURE — 3077F SYST BP >= 140 MM HG: CPT | Performed by: SURGERY

## 2023-10-11 PROCEDURE — 1159F MED LIST DOCD IN RCRD: CPT | Performed by: SURGERY

## 2023-10-11 PROCEDURE — 3079F DIAST BP 80-89 MM HG: CPT | Performed by: SURGERY

## 2023-10-11 PROCEDURE — 1036F TOBACCO NON-USER: CPT | Performed by: SURGERY

## 2023-10-11 PROCEDURE — 99212 OFFICE O/P EST SF 10 MIN: CPT | Performed by: SURGERY

## 2023-10-11 ASSESSMENT — ENCOUNTER SYMPTOMS: RECTAL PAIN: 1

## 2023-10-11 NOTE — PROGRESS NOTES
Subjective   Patient ID: Sid Farmer is a 78 y.o. male who presents for Follow-up (CT results).  HPI  Patient here for follow-up after CT symptoms are approximately the same he is to see urology and have a cystoscopy next week due to abnormality found on urology work-up  Review of Systems   Gastrointestinal:  Positive for rectal pain.   All other systems reviewed and are negative.      Objective   Physical Exam  Physical Exam  Constitutional:       Appearance: Normal appearance.   HENT:      Head: Normocephalic and atraumatic.      Mouth/Throat:      Pharynx: Oropharynx is clear.   Eyes:      Pupils: Pupils are equal, round, and reactive to light.   Cardiovascular:      Rate and Rhythm: Normal rate and regular rhythm.   Pulmonary:      Effort: Pulmonary effort is normal.      Breath sounds: Normal breath sounds.   Abdominal:      General: Abdomen is flat. Bowel sounds are normal.      Palpations: Abdomen is soft.   Musculoskeletal:         General: Normal range of motion.      Cervical back: Normal range of motion.   Skin:     General: Skin is warm.   Neurological:      General: No focal deficit present.      Mental Status: She is alert. Mental status is at baseline.   Psychiatric:         Mood and Affect: Mood normal.    Assessment/Plan        Follow-up after cystoscopy if symptoms continue

## 2023-10-17 NOTE — H&P
78-YEAR-OLD WHITE MALE  TO BE ADMITTED AS SHORT STAY OBSERVATION STATUS WEDNESDAY, 10/18/2023 FOR PROCEDURE CYSTOSCOPY, POSSIBLE BIOPSY FULGURATION BLADDER UNDER MAC IV SEDATION    REASON FOR ADMISSION AND MOST RECENT UROLOGY NOTE IS LISTED IMMEDIATELY BELOW AS WELL AS MOST RECENT HISTORY AND PHYSICAL FOR GENERAL BACKGROUND MEDICAL INFORMATION        MOST RECENT UROLOGY NOTE OUTLINING REASON FOR CURRENT ADMISSION AND CYSTOSCOPIC PROCEDURE IS LISTED IMMEDIATELY BELOW  Date: October 9, 2023  Reviewed and agree with nursing notes     No diagnosis found.     SUBJECTIVE:  Patient is in to review testing and discuss the bleeding that he is having from his, penis .     OBJECTIVE:  78-year-old white male  Remote history prostate cancer treated with brachytherapy and adjunctive androgen ablation along with boost about 2-3 years ago  PSA has stayed low currently 0.08 after Lupron  Renal bladder ultrasound shows unremarkable kidneys small cystic area right kidney no hydronephrosis the bladder however shows lower wall thickening about 2.3 by oh 0.6 cm unclear etiology  Urine cytology atypical  The remainder lab work unremarkable, mild renal insufficiency with serum threatening 1.39, CBC unremarkable     On close questioning, patient had about 2 weeks red tinged urine with small clots on a daily basis, stopped about a week ago, definitely was gross hematuria as described  Urinary stream is described as reasonable on Flomax no other blood thinners other than aspirin  At time of procedure cancer treatment patient did have a lesion in just and had pelvic radiotherapy for several weeks along with brachytherapy and androgen ablation, will followup with radiation oncology sometime in the next few months     Clinically I suspect possibly radiation cystitis we do need to do cystoscopy in order to check for and rule out any potential bladder tumors     Reviewed with patient in detail     Schedule cystoscopy, possible biopsy fulguration  "under MAC IV sedation soon and advise further  Reviewed and agreeable       _________________________________________________________________________  MOST RECENT GENERAL MEDICAL/CARDIAC EXAMINATION AND HISTORY AS OF 10/6/2023 IS LISTED IMMEDIATELY BELOW FOR ADDITIONAL BACKGROUND MEDICAL INFORMATION  Office Visit  10/6/2023  [facility]       Beba Calloway MD  Cardiology Paroxysmal atrial fibrillation (CMS/HCC) +9 more  Dx Atrial Fibrillation; Referred by Beba Calloway MD  Reason for Visit    Progress Notes  Beba Calloway MD (Physician)  Cardiology  Expand All Collapse All  Chief Complaint:   Atrial Fibrillation     History Of Present Illness:    Sid Farmer is a 78 y.o. male presenting with follow-up of pacemaker.  Denies any arrhythmia symptoms.  He denies any lightheadedness, dizziness, near-syncope or syncope.  His pacemaker site is well-healed.  He denies any redness, drainage, or swelling of the site.        Last Recorded Vitals:    Vitals  Vitals:    10/06/23 1305  BP: 136/64  Pulse: 67  Weight: 124 kg (274 lb)  Height: 1.626 m (5' 4\")          Past Medical History:  He has a past medical history of History of morbid obesity.     Past Surgical History:  He has a past surgical history that includes Other surgical history (10/28/2021); Other surgical history (10/28/2021); Other surgical history (10/28/2021); Other surgical history (10/28/2021); Other surgical history (10/28/2021); Other surgical history (10/28/2021); Other surgical history (10/28/2021); Other surgical history (10/28/2021); and Other surgical history (10/28/2021).      Social History:  He reports that he has quit smoking. His smoking use included cigarettes. He has never used smokeless tobacco. He reports that he does not currently use alcohol. He reports that he does not currently use drugs.     Family History:    Family History  Family History  Problem Relation Name Age of Onset   Other (acute myocardial infarction) Mother    "    Coronary artery disease Mother       Diabetes Mother       Other (ptca) Mother       Coronary artery disease Father       Other (cva) Sister              Allergies:  Iodinated contrast media, Pine tar, and Barium iodide     Outpatient Medications:    Current Outpatient Medications  Medication Instructions   albuterol 90 mcg/actuation inhaler INHALE 2 (TWO) PUFFSQ FOUR - 6 (SIX) HOURS AS NEEDED   amLODIPine (Norvasc) 5 mg tablet 1 tablet, oral, Daily   aspirin 81 mg chewable tablet 1 tablet, oral, Daily   cholecalciferol (Vitamin D-3) 125 MCG (5000 UT) capsule as directed Orally   cyanocobalamin, vitamin B-12, 2,500 mcg tablet,chewable as directed Orally   levothyroxine (Synthroid, Levoxyl) 50 mcg tablet 1 tablet, oral, Daily   levothyroxine (Tirosint) 25 mcg capsule Take 1 tablet by mouth on Monday, Wednesday, ad Friday. Take with 50 mcg tablet   losartan (Cozaar) 50 mg tablet 1 tablet, oral, 2 times daily   metoprolol tartrate (Lopressor) 50 mg tablet 1 tablet, oral, 2 times daily   nitroglycerin (Nitrostat) 0.4 mg SL tablet PLACE 1 TABLET UNDER THE TONGUE EVERY 5 MINUTES FOR UP TO 3 DOSES AS NEEDED FOR CHEST PAIN.CALL 911 IF PAIN PERSISTS.   pravastatin (Pravachol) 40 mg tablet 1 tablet, oral, Daily   tamsulosin (Flomax) 0.4 mg 24 hr capsule 1 capsule, oral, Daily     ROS  Constitutional: not feeling tired.   Eyes: no eyesight problems.   ENT: no hearing loss and no nosebleeds.   Cardiovascular: no intermittent leg claudication and as noted in HPI.   Respiratory: no chronic cough, no shortness of breath and as noted in HPI.   Gastrointestinal: no change in bowel habits and no blood in stools.   Genitourinary: no urinary frequency and no hematuria.   Skin: no skin rashes.   Neurological: no seizures and no frequent falls.   Psychiatric: no depression and not suicidal.   All other systems have been reviewed and are negative for complaint.  Physical Exam:  Constitutional: alert and in no acute distress.   Eyes: no  erythema, swelling or discharge from the eye .   Neck: neck is supple, symmetric, trachea midline, no masses  and no thyromegaly .   Pulmonary: no increased work of breathing or signs of respiratory distress  and lungs clear to auscultation.    Cardiovascular: carotid pulses 2+ bilaterally with no bruit , JVP was normal, , regular rhythm, normal S1 and S2, no murmurs ,  and no edema  .   Pacemaker/ICD Implant site has healed without signs of infection. Left sided device pocket normal.   Abdomen:  and .   Skin: skin warm and dry, normal skin turgor .   Psychiatric judgment and insight is normal  and oriented to person, place and time .            Last Labs:  CBC -    Lab Results  Component Value Date    WBC 7.6 09/19/2023    HGB 13.9 09/19/2023    HCT 42.3 09/19/2023    MCV 87 09/19/2023     09/19/2023        CMP -    Lab Results  Component Value Date    CALCIUM 9.4 09/19/2023    PHOS 3.5 09/20/2021    PROT 6.1 (L) 05/18/2022    ALBUMIN 3.9 05/18/2022    AST 16 05/18/2022    ALT 19 05/18/2022    ALKPHOS 59 05/18/2022    BILITOT 0.5 05/18/2022        LIPID PANEL -     Lab Results  Component Value Date    CHOL 151 05/18/2022    TRIG 237 (H) 05/18/2022    HDL 28.0 (A) 05/18/2022    CHHDL 5.4 (A) 05/18/2022    LDLF 76 05/18/2022    VLDL 47 (H) 05/18/2022    NHDL 123 05/18/2022        RENAL FUNCTION PANEL -     Lab Results  Component Value Date    GLUCOSE 88 09/19/2023     09/19/2023    K 4.4 09/19/2023     09/19/2023    CO2 26 09/19/2023    ANIONGAP 12 09/19/2023    BUN 17 09/19/2023    CREATININE 1.39 (H) 09/19/2023    GFRMALE 52 (A) 09/19/2023    CALCIUM 9.4 09/19/2023    PHOS 3.5 09/20/2021    ALBUMIN 3.9 05/18/2022          Lab Results  Component Value Date    BNP 78 12/11/2021    HGBA1C 6.3 (A) 05/30/2023        Last Cardiology Tests:  ECG:     Today.  Appropriate pacing.  Right axis deviation.  Corrected QT interval 480 ms     Device check today.  [employer] pacemaker.  Estimate longevity  "device over 5 years.  Noise inversions noted.  Echo:  No results found for this or any previous visit from the past 1095 days.        Ejection Fractions:  No results found for: \"EF\"     Cath:  No results found for this or any previous visit from the past 1095 days.        Stress Test:  Nuclear Stress Test 3/7/2023        Cardiac Imaging:  No results found for this or any previous visit from the past 1095 days.           Lab review: I have personally reviewed the laboratory result(s) see above        Assessment/Plan     Problem List Items Addressed This Visit                ICD-10-CM           Cardiac and Vasculature    AF (atrial fibrillation) (CMS/[facility]) - Primary I48.91    Relevant Medications    metoprolol tartrate (Lopressor) 50 mg tablet    Other Relevant Orders    Cardiac device check - Remote    ECG 12 lead (Clinic Performed)    Cardiac pacemaker in situ Z95.0    Relevant Orders    Cardiac Device Check - In Clinic    SSS (sick sinus syndrome) (CMS/[facility]) I49.5    Relevant Medications    metoprolol tartrate (Lopressor) 50 mg tablet    Ventricular tachycardia (CMS/[facility]) I47.20    Relevant Medications    metoprolol tartrate (Lopressor) 50 mg tablet    Other Relevant Orders    Cardiac device check - Remote    Atrioventricular block, second degree I44.1    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  Chronotropic incompetence.  Syncope.  Resolved after pacemaker implantation  [employer] pacemaker.  See above scan.  Normal " "device function.  Reviewed device check in detail with patient.  Ordered device checks.  Hypertension.  Chronic.  Stable.  Reviewed medications.  Refills.  Hyperglycemia.  Patient reports hemoglobin A1c 6.3.  Follows with PCP  Overweight  (Association recommendations and lifestyle modifications reviewed  Counseling greater than 50% of the visit.  I discussed with patient normal conduction, arrhythmia, atrial fibrillation, pacemaker, ventricular lead noise, pacing, treatment options, risk, benefits, imponderables.  All questions answered.  Patient appreciative care.     Beba Calloway MD            Instructions    3,9 remote checks  6 month Sarina NP  12 month Dr Calloway         AVS - Outpatient (Printed 10/6/2023)  Additional Documentation    Vitals: /64     Pulse 67     Ht 1.626 m (5' 4\")     Wt 124 kg (274 lb)     BMI 47.03 kg/m²     BSA 2.37 m²  Encounter Info: Billing Info,     History,     Allergies,     Developmental    Communications    View All Conversations on this Encounter  No questionnaires available.               Orders Placed      Cardiac Device Check - In Clinic    Cardiac device check - Remote    ECG 12 lead (Clinic Performed)    ECG 12 Lead  All Encounter Results  Medication Changes      None  Medication List  Visit Diagnoses      Paroxysmal atrial fibrillation (CMS/HCC)    SSS (sick sinus syndrome) (CMS/HCC)    Ventricular tachycardia (CMS/HCC)    Cardiac pacemaker in situ    Atrioventricular block, second degree    Disorder of cardiac pacemaker electrode, subsequent encounter    Essential hypertension    Mixed hyperlipidemia    Syncope due to sick sinus syndrome (CMS/HCC)    Atrial arrhythmia  Problem List    "

## 2023-10-18 ENCOUNTER — APPOINTMENT (OUTPATIENT)
Dept: RADIOLOGY | Facility: HOSPITAL | Age: 78
End: 2023-10-18
Payer: MEDICARE

## 2023-10-18 ENCOUNTER — HOSPITAL ENCOUNTER (OUTPATIENT)
Facility: HOSPITAL | Age: 78
Setting detail: OBSERVATION
Discharge: HOME | End: 2023-10-18
Attending: UROLOGY | Admitting: UROLOGY
Payer: MEDICARE

## 2023-10-18 ENCOUNTER — ANESTHESIA (OUTPATIENT)
Dept: OPERATING ROOM | Facility: HOSPITAL | Age: 78
End: 2023-10-18
Payer: MEDICARE

## 2023-10-18 ENCOUNTER — ANESTHESIA EVENT (OUTPATIENT)
Dept: OPERATING ROOM | Facility: HOSPITAL | Age: 78
End: 2023-10-18
Payer: MEDICARE

## 2023-10-18 VITALS
HEART RATE: 60 BPM | TEMPERATURE: 96.8 F | SYSTOLIC BLOOD PRESSURE: 167 MMHG | WEIGHT: 263.01 LBS | BODY MASS INDEX: 44.9 KG/M2 | DIASTOLIC BLOOD PRESSURE: 77 MMHG | RESPIRATION RATE: 16 BRPM | HEIGHT: 64 IN | OXYGEN SATURATION: 97 %

## 2023-10-18 DIAGNOSIS — N40.1 HYPERPLASIA OF PROSTATE WITH URINARY OBSTRUCTION: ICD-10-CM

## 2023-10-18 DIAGNOSIS — R30.0 DYSURIA: Chronic | ICD-10-CM

## 2023-10-18 DIAGNOSIS — C61 PROSTATE CANCER (MULTI): Primary | ICD-10-CM

## 2023-10-18 DIAGNOSIS — R31.9 HEMATURIA OF UNDIAGNOSED CAUSE: ICD-10-CM

## 2023-10-18 DIAGNOSIS — N13.8 HYPERPLASIA OF PROSTATE WITH URINARY OBSTRUCTION: ICD-10-CM

## 2023-10-18 LAB
ANION GAP SERPL CALC-SCNC: 11 MMOL/L (ref 10–20)
BUN SERPL-MCNC: 17 MG/DL (ref 6–23)
CALCIUM SERPL-MCNC: 8.7 MG/DL (ref 8.6–10.3)
CHLORIDE SERPL-SCNC: 105 MMOL/L (ref 98–107)
CO2 SERPL-SCNC: 23 MMOL/L (ref 21–32)
CREAT SERPL-MCNC: 1.33 MG/DL (ref 0.5–1.3)
ERYTHROCYTE [DISTWIDTH] IN BLOOD BY AUTOMATED COUNT: 13.1 % (ref 11.5–14.5)
GFR SERPL CREATININE-BSD FRML MDRD: 55 ML/MIN/1.73M*2
GLUCOSE BLD MANUAL STRIP-MCNC: 121 MG/DL (ref 74–99)
GLUCOSE SERPL-MCNC: 115 MG/DL (ref 74–99)
HCT VFR BLD AUTO: 37.1 % (ref 41–52)
HGB BLD-MCNC: 12.4 G/DL (ref 13.5–17.5)
MCH RBC QN AUTO: 29 PG (ref 26–34)
MCHC RBC AUTO-ENTMCNC: 33.4 G/DL (ref 32–36)
MCV RBC AUTO: 87 FL (ref 80–100)
NRBC BLD-RTO: 0 /100 WBCS (ref 0–0)
PLATELET # BLD AUTO: 180 X10*3/UL (ref 150–450)
PMV BLD AUTO: 10 FL (ref 7.5–11.5)
POTASSIUM SERPL-SCNC: 4.3 MMOL/L (ref 3.5–5.3)
RBC # BLD AUTO: 4.28 X10*6/UL (ref 4.5–5.9)
SODIUM SERPL-SCNC: 135 MMOL/L (ref 136–145)
WBC # BLD AUTO: 6.1 X10*3/UL (ref 4.4–11.3)

## 2023-10-18 PROCEDURE — 71046 X-RAY EXAM CHEST 2 VIEWS: CPT | Mod: FY

## 2023-10-18 PROCEDURE — G0379 DIRECT REFER HOSPITAL OBSERV: HCPCS

## 2023-10-18 PROCEDURE — C1769 GUIDE WIRE: HCPCS | Performed by: UROLOGY

## 2023-10-18 PROCEDURE — 2500000004 HC RX 250 GENERAL PHARMACY W/ HCPCS (ALT 636 FOR OP/ED): Mod: MUE | Performed by: UROLOGY

## 2023-10-18 PROCEDURE — 3700000001 HC GENERAL ANESTHESIA TIME - INITIAL BASE CHARGE: Performed by: UROLOGY

## 2023-10-18 PROCEDURE — 88112 CYTOPATH CELL ENHANCE TECH: CPT | Performed by: PATHOLOGY

## 2023-10-18 PROCEDURE — 82947 ASSAY GLUCOSE BLOOD QUANT: CPT | Mod: 59

## 2023-10-18 PROCEDURE — 87086 URINE CULTURE/COLONY COUNT: CPT | Mod: CMCLAB,ELYLAB | Performed by: UROLOGY

## 2023-10-18 PROCEDURE — 71046 X-RAY EXAM CHEST 2 VIEWS: CPT | Mod: FOREIGN READ | Performed by: RADIOLOGY

## 2023-10-18 PROCEDURE — A4217 STERILE WATER/SALINE, 500 ML: HCPCS | Mod: MUE | Performed by: UROLOGY

## 2023-10-18 PROCEDURE — 82565 ASSAY OF CREATININE: CPT | Performed by: UROLOGY

## 2023-10-18 PROCEDURE — 3600000003 HC OR TIME - INITIAL BASE CHARGE - PROCEDURE LEVEL THREE: Performed by: UROLOGY

## 2023-10-18 PROCEDURE — 85027 COMPLETE CBC AUTOMATED: CPT | Performed by: UROLOGY

## 2023-10-18 PROCEDURE — 71046 X-RAY EXAM CHEST 2 VIEWS: CPT | Mod: FR

## 2023-10-18 PROCEDURE — 88112 CYTOPATH CELL ENHANCE TECH: CPT | Mod: TC | Performed by: UROLOGY

## 2023-10-18 PROCEDURE — 2500000001 HC RX 250 WO HCPCS SELF ADMINISTERED DRUGS (ALT 637 FOR MEDICARE OP): Performed by: UROLOGY

## 2023-10-18 PROCEDURE — 3600000008 HC OR TIME - EACH INCREMENTAL 1 MINUTE - PROCEDURE LEVEL THREE: Performed by: UROLOGY

## 2023-10-18 PROCEDURE — G0378 HOSPITAL OBSERVATION PER HR: HCPCS

## 2023-10-18 PROCEDURE — 2500000005 HC RX 250 GENERAL PHARMACY W/O HCPCS

## 2023-10-18 PROCEDURE — 2500000004 HC RX 250 GENERAL PHARMACY W/ HCPCS (ALT 636 FOR OP/ED)

## 2023-10-18 PROCEDURE — 3700000002 HC GENERAL ANESTHESIA TIME - EACH INCREMENTAL 1 MINUTE: Performed by: UROLOGY

## 2023-10-18 PROCEDURE — 7100000009 HC PHASE TWO TIME - INITIAL BASE CHARGE: Performed by: UROLOGY

## 2023-10-18 PROCEDURE — 2720000007 HC OR 272 NO HCPCS: Performed by: UROLOGY

## 2023-10-18 PROCEDURE — 36415 COLL VENOUS BLD VENIPUNCTURE: CPT | Performed by: UROLOGY

## 2023-10-18 PROCEDURE — 2580000001 HC RX 258 IV SOLUTIONS: Performed by: UROLOGY

## 2023-10-18 PROCEDURE — 7100000010 HC PHASE TWO TIME - EACH INCREMENTAL 1 MINUTE: Performed by: UROLOGY

## 2023-10-18 RX ORDER — ACETAMINOPHEN 325 MG/1
975 TABLET ORAL EVERY 6 HOURS
Status: DISCONTINUED | OUTPATIENT
Start: 2023-10-18 | End: 2023-10-18 | Stop reason: HOSPADM

## 2023-10-18 RX ORDER — PHENAZOPYRIDINE HYDROCHLORIDE 100 MG/1
95 TABLET, FILM COATED ORAL AS NEEDED
Status: DISCONTINUED | OUTPATIENT
Start: 2023-10-18 | End: 2023-10-18 | Stop reason: HOSPADM

## 2023-10-18 RX ORDER — ONDANSETRON 4 MG/1
4 TABLET, ORALLY DISINTEGRATING ORAL EVERY 8 HOURS PRN
Status: DISCONTINUED | OUTPATIENT
Start: 2023-10-18 | End: 2023-10-18 | Stop reason: HOSPADM

## 2023-10-18 RX ORDER — OXYCODONE AND ACETAMINOPHEN 5; 325 MG/1; MG/1
1 TABLET ORAL EVERY 4 HOURS PRN
Status: DISCONTINUED | OUTPATIENT
Start: 2023-10-18 | End: 2023-10-18 | Stop reason: HOSPADM

## 2023-10-18 RX ORDER — OXYBUTYNIN CHLORIDE 5 MG/1
5 TABLET ORAL EVERY 8 HOURS PRN
Status: DISCONTINUED | OUTPATIENT
Start: 2023-10-18 | End: 2023-10-18 | Stop reason: HOSPADM

## 2023-10-18 RX ORDER — PHENAZOPYRIDINE HYDROCHLORIDE 100 MG/1
95 TABLET, FILM COATED ORAL
Status: DISCONTINUED | OUTPATIENT
Start: 2023-10-18 | End: 2023-10-18

## 2023-10-18 RX ORDER — FENTANYL CITRATE 50 UG/ML
25 INJECTION, SOLUTION INTRAMUSCULAR; INTRAVENOUS EVERY 5 MIN PRN
Status: CANCELLED | OUTPATIENT
Start: 2023-10-18

## 2023-10-18 RX ORDER — CIPROFLOXACIN 2 MG/ML
400 INJECTION, SOLUTION INTRAVENOUS ONCE
Status: COMPLETED | OUTPATIENT
Start: 2023-10-18 | End: 2023-10-18

## 2023-10-18 RX ORDER — PROPOFOL 10 MG/ML
INJECTION, EMULSION INTRAVENOUS CONTINUOUS PRN
Status: DISCONTINUED | OUTPATIENT
Start: 2023-10-18 | End: 2023-10-18

## 2023-10-18 RX ORDER — SODIUM CHLORIDE, SODIUM LACTATE, POTASSIUM CHLORIDE, CALCIUM CHLORIDE 600; 310; 30; 20 MG/100ML; MG/100ML; MG/100ML; MG/100ML
50 INJECTION, SOLUTION INTRAVENOUS CONTINUOUS
Status: DISCONTINUED | OUTPATIENT
Start: 2023-10-18 | End: 2023-10-18

## 2023-10-18 RX ORDER — CIPROFLOXACIN 2 MG/ML
400 INJECTION, SOLUTION INTRAVENOUS ONCE
Status: DISCONTINUED | OUTPATIENT
Start: 2023-10-18 | End: 2023-10-18

## 2023-10-18 RX ORDER — OXYCODONE HYDROCHLORIDE 5 MG/1
10 TABLET ORAL EVERY 4 HOURS PRN
Status: DISCONTINUED | OUTPATIENT
Start: 2023-10-18 | End: 2023-10-18 | Stop reason: HOSPADM

## 2023-10-18 RX ORDER — PHENAZOPYRIDINE HYDROCHLORIDE 95 MG/1
95 TABLET ORAL AS NEEDED
Qty: 10 TABLET | Refills: 0 | Status: SHIPPED | OUTPATIENT
Start: 2023-10-18 | End: 2024-03-07 | Stop reason: ALTCHOICE

## 2023-10-18 RX ORDER — ACETAMINOPHEN 500 MG
5 TABLET ORAL DAILY
Status: DISCONTINUED | OUTPATIENT
Start: 2023-10-18 | End: 2023-10-18 | Stop reason: HOSPADM

## 2023-10-18 RX ORDER — KETOROLAC TROMETHAMINE 30 MG/ML
15 INJECTION, SOLUTION INTRAMUSCULAR; INTRAVENOUS EVERY 8 HOURS
Status: DISCONTINUED | OUTPATIENT
Start: 2023-10-18 | End: 2023-10-18 | Stop reason: HOSPADM

## 2023-10-18 RX ORDER — PHENAZOPYRIDINE HYDROCHLORIDE 200 MG/1
200 TABLET, FILM COATED ORAL
Qty: 9 TABLET | Refills: 3 | Status: SHIPPED | OUTPATIENT
Start: 2023-10-18 | End: 2023-10-30

## 2023-10-18 RX ORDER — LIDOCAINE HYDROCHLORIDE 20 MG/ML
INJECTION, SOLUTION EPIDURAL; INFILTRATION; INTRACAUDAL; PERINEURAL AS NEEDED
Status: DISCONTINUED | OUTPATIENT
Start: 2023-10-18 | End: 2023-10-18

## 2023-10-18 RX ORDER — ONDANSETRON HYDROCHLORIDE 2 MG/ML
INJECTION, SOLUTION INTRAVENOUS AS NEEDED
Status: DISCONTINUED | OUTPATIENT
Start: 2023-10-18 | End: 2023-10-18

## 2023-10-18 RX ORDER — DEXTROSE MONOHYDRATE AND SODIUM CHLORIDE 5; .45 G/100ML; G/100ML
50 INJECTION, SOLUTION INTRAVENOUS CONTINUOUS
Status: DISCONTINUED | OUTPATIENT
Start: 2023-10-18 | End: 2023-10-18 | Stop reason: HOSPADM

## 2023-10-18 RX ORDER — ONDANSETRON HYDROCHLORIDE 2 MG/ML
4 INJECTION, SOLUTION INTRAVENOUS EVERY 8 HOURS PRN
Status: DISCONTINUED | OUTPATIENT
Start: 2023-10-18 | End: 2023-10-18 | Stop reason: HOSPADM

## 2023-10-18 RX ORDER — LIDOCAINE HYDROCHLORIDE 20 MG/ML
JELLY TOPICAL AS NEEDED
Status: DISCONTINUED | OUTPATIENT
Start: 2023-10-18 | End: 2023-10-18 | Stop reason: HOSPADM

## 2023-10-18 RX ORDER — FENTANYL CITRATE 50 UG/ML
INJECTION, SOLUTION INTRAMUSCULAR; INTRAVENOUS AS NEEDED
Status: DISCONTINUED | OUTPATIENT
Start: 2023-10-18 | End: 2023-10-18

## 2023-10-18 RX ORDER — DEXTROSE MONOHYDRATE AND SODIUM CHLORIDE 5; .45 G/100ML; G/100ML
50 INJECTION, SOLUTION INTRAVENOUS CONTINUOUS
Status: DISCONTINUED | OUTPATIENT
Start: 2023-10-18 | End: 2023-10-18

## 2023-10-18 RX ORDER — ACETAMINOPHEN 325 MG/1
650 TABLET ORAL EVERY 4 HOURS PRN
Status: DISCONTINUED | OUTPATIENT
Start: 2023-10-18 | End: 2023-10-18 | Stop reason: HOSPADM

## 2023-10-18 RX ORDER — MEPERIDINE HYDROCHLORIDE 25 MG/ML
12.5 INJECTION INTRAMUSCULAR; INTRAVENOUS; SUBCUTANEOUS EVERY 10 MIN PRN
Status: CANCELLED | OUTPATIENT
Start: 2023-10-18

## 2023-10-18 RX ORDER — SODIUM CHLORIDE 0.9 G/100ML
IRRIGANT IRRIGATION AS NEEDED
Status: DISCONTINUED | OUTPATIENT
Start: 2023-10-18 | End: 2023-10-18 | Stop reason: HOSPADM

## 2023-10-18 RX ORDER — OXYCODONE HYDROCHLORIDE 5 MG/1
5 TABLET ORAL EVERY 4 HOURS PRN
Status: DISCONTINUED | OUTPATIENT
Start: 2023-10-18 | End: 2023-10-18 | Stop reason: HOSPADM

## 2023-10-18 RX ORDER — FENTANYL CITRATE 50 UG/ML
50 INJECTION, SOLUTION INTRAMUSCULAR; INTRAVENOUS EVERY 5 MIN PRN
Status: CANCELLED | OUTPATIENT
Start: 2023-10-18

## 2023-10-18 RX ORDER — NALOXONE HYDROCHLORIDE 0.4 MG/ML
0.2 INJECTION, SOLUTION INTRAMUSCULAR; INTRAVENOUS; SUBCUTANEOUS EVERY 5 MIN PRN
Status: DISCONTINUED | OUTPATIENT
Start: 2023-10-18 | End: 2023-10-18 | Stop reason: HOSPADM

## 2023-10-18 RX ORDER — PROPOFOL 10 MG/ML
INJECTION, EMULSION INTRAVENOUS AS NEEDED
Status: DISCONTINUED | OUTPATIENT
Start: 2023-10-18 | End: 2023-10-18

## 2023-10-18 RX ADMIN — FENTANYL CITRATE 25 MCG: 50 INJECTION, SOLUTION INTRAMUSCULAR; INTRAVENOUS at 08:27

## 2023-10-18 RX ADMIN — PROPOFOL 30 MG: 10 INJECTION, EMULSION INTRAVENOUS at 08:28

## 2023-10-18 RX ADMIN — OXYBUTYNIN CHLORIDE 5 MG: 5 TABLET ORAL at 10:28

## 2023-10-18 RX ADMIN — PROPOFOL 50 MCG/KG/MIN: 10 INJECTION, EMULSION INTRAVENOUS at 07:58

## 2023-10-18 RX ADMIN — CIPROFLOXACIN 400 MG: 2 INJECTION, SOLUTION INTRAVENOUS at 07:20

## 2023-10-18 RX ADMIN — SODIUM CHLORIDE, POTASSIUM CHLORIDE, SODIUM LACTATE AND CALCIUM CHLORIDE 50 ML/HR: 600; 310; 30; 20 INJECTION, SOLUTION INTRAVENOUS at 06:27

## 2023-10-18 RX ADMIN — ACETAMINOPHEN 650 MG: 325 TABLET ORAL at 10:17

## 2023-10-18 RX ADMIN — PROPOFOL 50 MG: 10 INJECTION, EMULSION INTRAVENOUS at 07:58

## 2023-10-18 RX ADMIN — LIDOCAINE HYDROCHLORIDE 60 MG: 20 INJECTION, SOLUTION EPIDURAL; INFILTRATION; INTRACAUDAL; PERINEURAL at 07:58

## 2023-10-18 RX ADMIN — PHENAZOPYRIDINE 100 MG: 100 TABLET ORAL at 11:24

## 2023-10-18 RX ADMIN — FENTANYL CITRATE 25 MCG: 50 INJECTION, SOLUTION INTRAMUSCULAR; INTRAVENOUS at 08:05

## 2023-10-18 RX ADMIN — ONDANSETRON 4 MG: 2 INJECTION INTRAMUSCULAR; INTRAVENOUS at 07:56

## 2023-10-18 ASSESSMENT — COLUMBIA-SUICIDE SEVERITY RATING SCALE - C-SSRS
2. HAVE YOU ACTUALLY HAD ANY THOUGHTS OF KILLING YOURSELF?: NO
6. HAVE YOU EVER DONE ANYTHING, STARTED TO DO ANYTHING, OR PREPARED TO DO ANYTHING TO END YOUR LIFE?: NO

## 2023-10-18 ASSESSMENT — PAIN SCALES - GENERAL
PAINLEVEL_OUTOF10: 0 - NO PAIN
PAINLEVEL_OUTOF10: 3
PAINLEVEL_OUTOF10: 5 - MODERATE PAIN
PAINLEVEL_OUTOF10: 1
PAINLEVEL_OUTOF10: 4
PAINLEVEL_OUTOF10: 4

## 2023-10-18 ASSESSMENT — PAIN - FUNCTIONAL ASSESSMENT
PAIN_FUNCTIONAL_ASSESSMENT: 0-10

## 2023-10-18 ASSESSMENT — PAIN DESCRIPTION - DESCRIPTORS
DESCRIPTORS: BURNING
DESCRIPTORS: BURNING

## 2023-10-18 NOTE — ANESTHESIA PREPROCEDURE EVALUATION
Sid Farmer is a 78 y.o. 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)   (+) Hypothyroidism      /Renal   (+) 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)       Lab Results   Component Value Date    HGB 13.9 09/19/2023    HCT 42.3 09/19/2023    WBC 7.6 09/19/2023     09/19/2023     09/19/2023    K 4.4 09/19/2023     09/19/2023    CREATININE 1.39 (H) 09/19/2023    BUN 17 09/19/2023       Social History     Tobacco Use   Smoking Status Former   • Types: Cigarettes   Smokeless Tobacco Never       Allergies   Allergen Reactions   • Barium Iodide Rash   • Iodinated Contrast Media Rash   • Pine Tar Itching and Rash       Current Outpatient Medications   Medication Instructions   • albuterol 90 mcg/actuation inhaler INHALE 2 (TWO) PUFFSQ FOUR - 6 (SIX) HOURS AS NEEDED   • amLODIPine (Norvasc) 5 mg tablet 1 tablet, oral, Daily   • aspirin 81 mg chewable tablet 1 tablet, oral, Daily   • cholecalciferol (Vitamin D-3) 125 MCG (5000 UT) capsule Take 1 capsule (5,000 Units) by mouth once daily.   • cyanocobalamin, vitamin B-12, 2,500 mcg tablet,chewable Chew 2,500 mcg 1 time.   • levothyroxine (Synthroid, Levoxyl) 50 mcg tablet 1 tablet, oral, Daily   • levothyroxine (Tirosint) 25 mcg capsule Take 1 tablet by mouth on Monday, Wednesday, ad Friday. Take with 50 mcg tablet   • losartan (COZAAR) 50 mg, oral, 2 times daily   • metoprolol tartrate (LOPRESSOR) 50  mg, oral, 2 times daily   • nitroglycerin (Nitrostat) 0.4 mg SL tablet PLACE 1 TABLET UNDER THE TONGUE EVERY 5 MINUTES FOR UP TO 3 DOSES AS NEEDED FOR CHEST PAIN.CALL 911 IF PAIN PERSISTS.   • pravastatin (PRAVACHOL) 40 mg, oral, Daily   • tamsulosin (Flomax) 0.4 mg 24 hr capsule 1 capsule, oral, Daily   • ubidecarenone (ULTRA COQ10 ORAL) 100 mg, oral, Once       Past Medical History:   Diagnosis Date   • Anemia    • Anxiety    • Arthritis    • Atrial arrhythmia    • Atrial fibrillation (CMS/HCC)    • CKD (chronic kidney disease)     Stage 2 d/t DM2   • Constipation    • Coronary artery disease    • Depression    • Diabetes mellitus (CMS/HCC)     no medication; elevation of HgA1C   • Diverticulosis    • Dizziness    • GERD (gastroesophageal reflux disease)    • History of morbid obesity    • History of OCD (obsessive compulsive disorder)    • Hypertension    • Hypothyroidism    • Joint pain     lower back   • Prostate cancer (CMS/HCC)    • Rectal pain    • Second degree AV block    • Shortness of breath     use of inhaler if needed   • Sleep apnea     uses BiPAP   • Syncope    • Vertigo    • Wears glasses        Past Surgical History:   Procedure Laterality Date   • CARDIAC CATHETERIZATION     • CARDIAC PACEMAKER PLACEMENT     • CAROTID STENT     • CATARACT EXTRACTION      BILATERAL   • COLONOSCOPY     • CORONARY ANGIOPLASTY     • CORONARY STENT PLACEMENT     • HERNIA REPAIR     • INTRAOCULAR LENS INSERTION     • PROSTATE BIOPSY     • PROSTATE SURGERY         Family History   Problem Relation Name Age of Onset   • Other (acute myocardial infarction) Mother     • Coronary artery disease Mother     • Diabetes Mother     • Other (ptca) Mother     • Coronary artery disease Father     • Other (cva) Sister         NPO Details:  NPO/Void Status  Carbonhydrate Drink Given Prior to Surgery? : N  Date of Last Liquid: 10/17/23  Time of Last Liquid: 1930  Date of Last Solid: 10/17/23  Time of Last Solid: 1900  Last Intake Type:  Clear fluids  Time of Last Void: 0530        Physical Exam    Airway  Mallampati: II  TM distance: >3 FB  Neck ROM: full     Cardiovascular - normal exam     Dental        Pulmonary - normal exam     Abdominal   (+) obese           Anesthesia Plan    ASA 3     MAC     intravenous induction   Anesthetic plan and risks discussed with patient and sibling.    Plan discussed with CRNA.

## 2023-10-18 NOTE — ANESTHESIA POSTPROCEDURE EVALUATION
Patient: Sid Farmer    Procedure Summary       Date: 10/18/23 Room / Location: [facility] / [facility]    Anesthesia Start: 0746 Anesthesia Stop:     Procedure: cystoscopy, complex over the wire dilation, membranous uretheral stricture, solano cath, bladder irrigation Diagnosis:       Hematuria of undiagnosed cause      Hyperplasia of prostate with urinary obstruction      Membranous urethral stricture      (Hematuria, BPH with urinary obstruction)    Surgeons: Louis D'Amico, MD Responsible Provider: Edmundo Porras MD    Anesthesia Type: MAC ASA Status: 3            Anesthesia Type: MAC    Vitals Value Taken Time   /65 10/18/23 0842   Temp 36 10/18/23 0842   Pulse 63 10/18/23 0842   Resp 12 10/18/23 0842   SpO2 95 10/18/23 0842       Anesthesia Post Evaluation    Patient location during evaluation: PACU  Patient participation: complete - patient participated  Level of consciousness: awake  Pain management: adequate  Airway patency: patent  Cardiovascular status: acceptable  Respiratory status: acceptable  Hydration status: acceptable        No notable events documented.

## 2023-10-18 NOTE — DISCHARGE INSTRUCTIONS
Cystoscopy Discharge Instructions    About this topic  Your kidneys make urine. It is stored in your bladder. The urethra is a tube at the bottom of the bladder. Urine flows out of this tube. Sometimes, there is a blockage and urine is not able to leave the body.  A cystoscopy is a procedure that lets the doctor see the inside of your bladder and urethra. The doctor does it to:  Look for stones or tumors blocking the bladder and urethra  Look for changes or injury inside the bladder  Take a tissue sample from the inside of your bladder  Look for reasons for blood in the urine, pain with urination, or why you are passing urine often  Look for prostate problems    What care is needed at home?  Ask your doctor what you need to do when you go home. Make sure you ask questions if you do not understand what the doctor says. This way you will know what you need to do.  Take a warm bath or use a warm wet washcloth over the opening to the urethra. This will help to ease any pain. Do this as needed.  Drink 6 to 8 glasses of water a day and 3 to 4 glasses in the first few hours after the procedure to flush out your bladder and reduce irritation.  You may see some blood in your urine for a few days. This is normal.  Empty your bladder as soon as you feel the need to. Don't delay going to the bathroom. It stretches and weakens the bladder.  What follow-up care is needed?  Your doctor may ask you to make visits to the office to check on your progress. Be sure to keep these visits.  If you had a biopsy, talk with your doctor about the results.  What drugs may be needed?  The doctor may order drugs to:  Help with pain  Fight an infection  Help with bladder spasms  Will physical activity be limited?  Talk to your doctor about when you may go back to your normal activities like work, driving, or sex.  What problems could happen?  Bleeding  Infection  Injury to the bladder and urethra  Discomfort in the urethra area  Burning sensation  for a short time  Upset stomach  When do I need to call the doctor?  Signs of infection. These include a fever of 100.4°F (38°C) or higher, chills, pain with passing urine.  Pain that does not go away even with drugs or that lasts longer than 2 days  Too much blood in your urine  Passing large dime-sized clots  Cloudy urine  Little or no urine or not able to pass urine  Abdominal pain and nausea  Teach Back: Helping You Understand  The Teach Back Method helps you understand the information we are giving you. After you talk with the staff, tell them in your own words what you learned. This helps to make sure the staff has described each thing clearly. It also helps to explain things that may have been confusing. Before going home, make sure you can do these:  I can tell you about my procedure.  I can tell you what may help ease my pain.  I can tell you what I will do if I have a fever, chills, or am not able to pass urine.  Where can I learn more?  American [employer] Society  https://www.cancer.org/treatment/understanding-your-diagnosis/tests/endoscopy/cystoscopy.html  [employer]  https://www.cancerresearchuk.org/about-cancer/bladder-cancer/getting-diagnosed/tests-diagnose/cystoscopy  NHS Choices  http://www.nhs.uk/conditions/Cystoscopy/Pages/Introduction.aspx  Last Reviewed Date  2021-04-22  General Anesthesia Discharge Instructions    About this topic  You may need general anesthesia if you need to be asleep during a procedure. Your doctor will use drugs to block the signals that go from your nerves to your brain. Doctors give general anesthesia during a surgery or procedure to:  Allow you to sleep  Help your body be still  Relax your muscles  Help you to relax and be pain free  Keep you from remembering the surgery  Let the doctor manage your airway, breathing, and blood flow  The doctor or nurse anesthetist gives general anesthesia by a shot into your vein. Sometimes, you may breathe in a gas through a mask  placed over your face.  What care is needed at home?  Ask your doctor what you need to do when you go home. Make sure you ask questions if you do not understand what the doctor says.  Your doctor may give you drugs to prevent or treat an upset stomach from the anesthetic. Take them as ordered.  If your throat is sore, suck on ice chips or popsicles to ease throat pain.  Put 2 to 3 pillows under your head and back when you lie down to help you breathe easier.  For the first 24 to 48 hours:  Do not operate heavy or dangerous machinery.  Do not make major decisions or sign important papers. You may not be able to think clearly.  Avoid beer, wine, or mixed drinks.  You are at a higher risk of falling for at least 24 hours after general anesthesia.  Take extra care when you get up.  Do not change positions quickly.  Do not rush when you need to go to the bathroom or to answer the phone.  Ask for help if you feel unsteady when you try to walk.  Wear shoes with non-slip soles and low heels.  What follow-up care is needed?  Your doctor may ask you to come back to the office to check on your progress. Be sure to keep these visits.  If you have stitches that do not dissolve or staples, you will need to have them removed. Your doctor will want to do this in 1 to 2 weeks. If the doctor used skin glue, the glue will fall off on its own.  What drugs may be needed?  The doctor may order drugs to:  Help with pain  Treat an upset stomach or throwing up  Will physical activity be limited?  You will not be allowed to drive right away after the procedure. Ask a family member or a friend to drive you home.  Avoid trying to get out of bed without help until you are sure of your balance.  You may have to limit your activity. Talk to your doctor about if you need to limit how much you lift or limit exercise after your procedure.  What changes to diet are needed?  Start with a light diet when you are fully awake. This includes things that  are easy to swallow like soups, pudding, jello, toast, and eggs. Slowly progress to your normal diet.  What problems could happen?  Low blood pressure  Breathing problems  Upset stomach or throwing up  Dizziness  Blood clots  Infection  When do I need to call the doctor?  Trouble breathing  Upset stomach or throwing up more than 3 times in the next 2 days  Dizziness  Teach Back: Helping You Understand  The Teach Back Method helps you understand the information we are giving you. After you talk with the staff, tell them in your own words what you learned. This helps to make sure the staff has described each thing clearly. It also helps to explain things that may have been confusing. Before going home, make sure you can do these:  I can tell you about my procedure.  I can tell you if I need to follow up with my doctor.  I can tell you what is good for me to eat and drink the next day.  I can tell you what I would do if I have trouble breathing, an upset stomach, or dizziness.  Where can I learn more?  [employer]  https://www.nigms.nih.gov/education/pages/factsheet_Anesthesia.aspx  NHS Choices  http://www.nhs.uk/conditions/Anaesthetic-general/Pages/Definition.aspx  Last Reviewed Date  2020-04-22

## 2023-10-18 NOTE — OP NOTE
PREOPERATIVE DIAGNOSIS: [] Recurrent episodes of gross hematuria, remote history prostate cancer status post brachytherapy and pelvic radiotherapy, obstructive voiding symptoms    POSTOPERATIVE DIAGNOSIS: [] Same as preop and severe membranous urethral stricture with visual evidence of bladder neck obstruction and incomplete bladder emptying, and chronic cystitis    OPERATIVE PROCEDURE: [] Cystoscopy, complex over-the-wire dilation of severe membranous urethral stricture, insertion 20 Egyptian coudé tip Farias catheter, bladder irrigation for hematuria/clots,    ANESTHESIA: [] Parenteral MAC IV sedation and 20 cc of 2% plain lidocaine jelly per urethra    ASSISTANTS: [] None    IV FLUIDS: [] As per record    ESTIMATED BLOOD LOSS: [] None    PROCEDURE AS FOLLOWS: []    AN APPROPRIATE HUDDLE AND TIMEOUT PROCEDURE WAS APPROPRIATELY CARRIED OUT BEFORE AND AFTER ANESTHESIA WAS ADMINISTERED IN SATISFACTORY FASHION WITH SURGEON PATIENT AND ALL OPERATING ROOM PERSONNEL PRESENT ACCORDING TO ROUTINE POLICY.    After administration of satisfactory parenteral anesthesia MAC IV sedation the patient was placed in lithotomy position prepped and draped per routine and 20 cc of 2% plain lidocaine jelly administered per urethra for additional local anesthesia.    Initially the 20 Egyptian 22.5 Egyptian rigid Olympus cystoscopic unit was utilized  The distal urethra was unremarkable  Immediately on visualizing the membranous urethra there was an extremely tight pinpoint opening consistent with severe membranous urethral stricture.  Therefore through the working element of the obturator cystoscope we passed a 0.035 Glidewire through the stricture coiling it in the bladder and removing the scope outward over the Glidewire leaving a segment emanating from the urethral meatus.    Then utilizing progressive Cook dilators over the wire we proceeded to dilate the membranous urethral stricture from about 14-16 Egyptian through 24 Egyptian.  The wire was  left in as a guide.    The cystoscope was then passed into the urethra through the strictured area requiring angulation upward from formation of some fibrous bladder neck contracture probably from prior radiation inflammatory reaction.  On entry into the bladder there were a few small clots at the base of the bladder that were irrigated out with a Bard Micah syringe.  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  All instruments were removed and a 20 French coudé tip Farias catheter placed into the bladder with clear return of irrigant.  Glidewire was also removed.    Patient tolerated procedure well and was returned to the outpatient recovery discharge area in satisfactory condition.    Follow-up    We will plan to leave Farias catheter indwelling for about 1 hour postoperatively and removed with voiding trial  Resume low-dose aspirin tomorrow or the next day if urine is yellow clear and no hematuria  In view of the severe membranous stricture, we will likely need follow-up in about 3 months with repeat cystoscopy, dilation as needed and periodic follow-up thereafter.    The procedure was performed entirely by myself      LOUIS D'AMICO, MD  [facility]  [address] [place] [address]  Board-certified, American Board of urology  Office telephone [phone]  Office fax   [phone]

## 2023-10-19 LAB — BACTERIA UR CULT: NO GROWTH

## 2023-10-20 LAB
LABORATORY COMMENT REPORT: NORMAL
LABORATORY COMMENT REPORT: NORMAL
PATH REPORT.COMMENTS IMP SPEC: NORMAL
PATH REPORT.FINAL DX SPEC: NORMAL
PATH REPORT.GROSS SPEC: NORMAL
PATH REPORT.RELEVANT HX SPEC: NORMAL
PATH REPORT.TOTAL CANCER: NORMAL

## 2023-11-22 DIAGNOSIS — N39.41 URGE INCONTINENCE: ICD-10-CM

## 2023-11-22 DIAGNOSIS — N40.1 BENIGN PROSTATIC HYPERPLASIA WITH LOWER URINARY TRACT SYMPTOMS: Primary | ICD-10-CM

## 2023-11-22 DIAGNOSIS — C61 MALIGNANT NEOPLASM OF PROSTATE (MULTI): ICD-10-CM

## 2023-11-22 DIAGNOSIS — N31.8 OTHER NEUROMUSCULAR DYSFUNCTION OF BLADDER: ICD-10-CM

## 2023-11-22 DIAGNOSIS — R39.11 HESITANCY OF MICTURITION: ICD-10-CM

## 2023-11-22 DIAGNOSIS — R97.20 ELEVATED PROSTATE SPECIFIC ANTIGEN (PSA): ICD-10-CM

## 2023-11-28 ENCOUNTER — HOSPITAL ENCOUNTER (OUTPATIENT)
Facility: HOSPITAL | Age: 78
Setting detail: OUTPATIENT SURGERY
End: 2023-11-28
Attending: UROLOGY | Admitting: UROLOGY
Payer: MEDICARE

## 2023-11-29 ENCOUNTER — OFFICE VISIT (OUTPATIENT)
Dept: CARDIOLOGY | Facility: CLINIC | Age: 78
End: 2023-11-29
Payer: MEDICARE

## 2023-11-29 VITALS
HEIGHT: 64 IN | DIASTOLIC BLOOD PRESSURE: 68 MMHG | OXYGEN SATURATION: 98 % | SYSTOLIC BLOOD PRESSURE: 136 MMHG | BODY MASS INDEX: 46.38 KG/M2 | WEIGHT: 271.7 LBS | HEART RATE: 62 BPM

## 2023-11-29 DIAGNOSIS — G47.33 OBSTRUCTIVE SLEEP APNEA SYNDROME: ICD-10-CM

## 2023-11-29 DIAGNOSIS — C61 MALIGNANT NEOPLASM OF PROSTATE (MULTI): ICD-10-CM

## 2023-11-29 DIAGNOSIS — I25.10 CAD S/P PERCUTANEOUS CORONARY ANGIOPLASTY: ICD-10-CM

## 2023-11-29 DIAGNOSIS — Z98.61 CAD S/P PERCUTANEOUS CORONARY ANGIOPLASTY: ICD-10-CM

## 2023-11-29 DIAGNOSIS — E78.2 MIXED HYPERLIPIDEMIA: ICD-10-CM

## 2023-11-29 DIAGNOSIS — Z87.19 HISTORY OF GASTROESOPHAGEAL REFLUX (GERD): ICD-10-CM

## 2023-11-29 DIAGNOSIS — I12.9 CHRONIC KIDNEY DISEASE DUE TO HYPERTENSION: ICD-10-CM

## 2023-11-29 DIAGNOSIS — I10 BENIGN ESSENTIAL HYPERTENSION: ICD-10-CM

## 2023-11-29 DIAGNOSIS — Z87.891 FORMER CIGARETTE SMOKER: ICD-10-CM

## 2023-11-29 DIAGNOSIS — I87.2 CHRONIC VENOUS INSUFFICIENCY: ICD-10-CM

## 2023-11-29 DIAGNOSIS — Z95.0 CARDIAC PACEMAKER IN SITU: ICD-10-CM

## 2023-11-29 DIAGNOSIS — E03.9 HYPOTHYROIDISM, UNSPECIFIED TYPE: ICD-10-CM

## 2023-11-29 PROBLEM — N32.89 BLADDER SPASMS: Status: ACTIVE | Noted: 2023-10-09

## 2023-11-29 PROBLEM — R39.198 SLOW URINARY STREAM: Status: ACTIVE | Noted: 2020-08-03

## 2023-11-29 PROBLEM — H52.203 HYPEROPIA OF BOTH EYES WITH ASTIGMATISM AND PRESBYOPIA: Status: ACTIVE | Noted: 2020-06-08

## 2023-11-29 PROBLEM — R39.11 URINARY HESITANCY: Status: ACTIVE | Noted: 2021-06-21

## 2023-11-29 PROBLEM — H52.03 HYPEROPIA OF BOTH EYES WITH ASTIGMATISM AND PRESBYOPIA: Status: ACTIVE | Noted: 2020-06-08

## 2023-11-29 PROBLEM — N40.0 ENLARGED PROSTATE WITHOUT LOWER URINARY TRACT SYMPTOMS (LUTS): Status: ACTIVE | Noted: 2020-08-03

## 2023-11-29 PROBLEM — N40.2 PROSTATE NODULE: Status: ACTIVE | Noted: 2020-10-28

## 2023-11-29 PROBLEM — N40.1 NOCTURIA ASSOCIATED WITH BENIGN PROSTATIC HYPERPLASIA: Status: ACTIVE | Noted: 2020-01-16

## 2023-11-29 PROBLEM — N18.2 STAGE 2 CHRONIC KIDNEY DISEASE DUE TO TYPE 2 DIABETES MELLITUS (MULTI): Status: ACTIVE | Noted: 2023-10-09

## 2023-11-29 PROBLEM — N13.8 BPH WITH OBSTRUCTION/LOWER URINARY TRACT SYMPTOMS: Status: ACTIVE | Noted: 2020-01-16

## 2023-11-29 PROBLEM — N39.41 URGENCY INCONTINENCE: Status: ACTIVE | Noted: 2021-06-21

## 2023-11-29 PROBLEM — E66.01 MORBID OBESITY (MULTI): Status: ACTIVE | Noted: 2023-10-09

## 2023-11-29 PROBLEM — N40.1 BPH WITH OBSTRUCTION/LOWER URINARY TRACT SYMPTOMS: Status: ACTIVE | Noted: 2020-01-16

## 2023-11-29 PROBLEM — E11.22 STAGE 2 CHRONIC KIDNEY DISEASE DUE TO TYPE 2 DIABETES MELLITUS (MULTI): Status: ACTIVE | Noted: 2023-10-09

## 2023-11-29 PROBLEM — N31.8 FREQUENCY-URGENCY SYNDROME: Status: ACTIVE | Noted: 2020-08-03

## 2023-11-29 PROBLEM — R97.20 ELEVATED PSA: Status: ACTIVE | Noted: 2020-01-16

## 2023-11-29 PROBLEM — H52.4 HYPEROPIA OF BOTH EYES WITH ASTIGMATISM AND PRESBYOPIA: Status: ACTIVE | Noted: 2020-06-08

## 2023-11-29 PROBLEM — N18.30 STAGE 3 CHRONIC KIDNEY DISEASE (MULTI): Status: ACTIVE | Noted: 2023-10-09

## 2023-11-29 PROBLEM — Z98.890 STATUS POST YAG CAPSULOTOMY OF BOTH EYES: Status: ACTIVE | Noted: 2021-09-24

## 2023-11-29 PROBLEM — R35.0 URINARY FREQUENCY: Status: ACTIVE | Noted: 2020-01-16

## 2023-11-29 PROBLEM — R35.1 NOCTURIA ASSOCIATED WITH BENIGN PROSTATIC HYPERPLASIA: Status: ACTIVE | Noted: 2020-01-16

## 2023-11-29 PROCEDURE — 3078F DIAST BP <80 MM HG: CPT | Performed by: INTERNAL MEDICINE

## 2023-11-29 PROCEDURE — 1036F TOBACCO NON-USER: CPT | Performed by: INTERNAL MEDICINE

## 2023-11-29 PROCEDURE — 99214 OFFICE O/P EST MOD 30 MIN: CPT | Performed by: INTERNAL MEDICINE

## 2023-11-29 PROCEDURE — 1125F AMNT PAIN NOTED PAIN PRSNT: CPT | Performed by: INTERNAL MEDICINE

## 2023-11-29 PROCEDURE — 3075F SYST BP GE 130 - 139MM HG: CPT | Performed by: INTERNAL MEDICINE

## 2023-11-29 PROCEDURE — 1159F MED LIST DOCD IN RCRD: CPT | Performed by: INTERNAL MEDICINE

## 2023-11-29 NOTE — PATIENT INSTRUCTIONS
Hold Pravastatin for 1 month to see if muscle aches improve and let office know.    Follow up office visit in 6 months.    Continue same medications/treatment.  Patient educated on proper medication use.  Please bring all medicines, vitamins and herbal supplements with you when you come to the office.    I, Marcy Alvarenga LPN, am scribing for and in the presence of Dr. Slim Jiang MD, FACC

## 2023-11-29 NOTE — PROGRESS NOTES
CARDIOLOGY OFFICE VISIT      CHIEF COMPLAINT      HISTORY OF PRESENT ILLNESS  The patient states that he has been doing about as he usually is.  He denies chest discomfort or symptoms of myocardial ischemia.  He has not used nitroglycerin.  He has chronic mild dyspnea.  He does finally have his BiPAP machine working properly again for his sleep apnea.  He has his usual swelling of his lower extremities bilaterally.  He denies palpitations and syncope.  He denies any problem with his current medications.      IMPRESSION:   1. Morbid obesity  2. Coronary artery disease,   3. Remote multivessel percutaneous coronary intervention.  4. Essential hypertension.  5. Mixed hyperlipidemia.  6. Permanent pacemaker for sinus node dysfunction.  7. Obstructive sleep apnea, being managed with BiPAP.  8. History of gastroesophageal reflux.  9. Hypothyroidism, on replacement therapy.  10. Prostate cancer being treated with radiation  11. Chronic renal insufficiency, stage III, being seen by nephrology  12. Chronic venous insufficiency of lower extremities with bilateral pretibial edema              Please excuse any errors in grammar or translation related to this dictation. Voice recogni        Past Medical History  Past Medical History:   Diagnosis Date    Anemia     Anxiety     Arthritis     Atrial arrhythmia     Atrial fibrillation (CMS/HCC)     CKD (chronic kidney disease)     Stage 2 d/t DM2    Constipation     Coronary artery disease     Depression     Diabetes mellitus (CMS/HCC)     no medication; elevation of HgA1C    Diverticulosis     Dizziness     GERD (gastroesophageal reflux disease)     History of morbid obesity     History of OCD (obsessive compulsive disorder)     Hypertension     Hypothyroidism     Joint pain     lower back    Prostate cancer (CMS/HCC)     Rectal pain     Second degree AV block     Shortness of breath     use of inhaler if needed    Sleep apnea     uses BiPAP    Syncope     Vertigo     Wears  glasses        Social History  Social History     Tobacco Use    Smoking status: Former     Types: Cigarettes    Smokeless tobacco: Never   Vaping Use    Vaping Use: Never used   Substance Use Topics    Alcohol use: Yes     Comment: rare, New Years    Drug use: Never       Family History     Family History   Problem Relation Name Age of Onset    Other (acute myocardial infarction) Mother      Coronary artery disease Mother      Diabetes Mother      Other (ptca) Mother      Coronary artery disease Father      Other (cva) Sister          Allergies:  Allergies   Allergen Reactions    Barium Iodide Rash    Iodinated Contrast Media Rash    Pine Tar Itching and Rash        Outpatient Medications:  Current Outpatient Medications   Medication Instructions    albuterol 90 mcg/actuation inhaler INHALE 2 (TWO) PUFFSQ FOUR - 6 (SIX) HOURS AS NEEDED    amLODIPine (Norvasc) 5 mg tablet 1 tablet, oral, Daily    aspirin 81 mg chewable tablet 1 tablet, oral, Daily    cholecalciferol (Vitamin D-3) 125 MCG (5000 UT) capsule Take 1 capsule (125 mcg) by mouth once daily.    cyanocobalamin, vitamin B-12, 2,500 mcg tablet,chewable Chew 2,500 mcg 1 time.    levothyroxine (Synthroid, Levoxyl) 50 mcg tablet 1 tablet, oral, Daily    levothyroxine (Tirosint) 25 mcg capsule Take 1 tablet by mouth on Monday, Wednesday, ad Friday. Take with 50 mcg tablet    losartan (COZAAR) 50 mg, oral, 2 times daily    metoprolol tartrate (LOPRESSOR) 50 mg, oral, 2 times daily    nitroglycerin (Nitrostat) 0.4 mg SL tablet PLACE 1 TABLET UNDER THE TONGUE EVERY 5 MINUTES FOR UP TO 3 DOSES AS NEEDED FOR CHEST PAIN.CALL 911 IF PAIN PERSISTS.    phenazopyridine (URINARY PAIN RELIEF) 95 mg, oral, As needed    pravastatin (PRAVACHOL) 40 mg, oral, Daily    tamsulosin (Flomax) 0.4 mg 24 hr capsule 1 capsule, oral, Daily    ubidecarenone (ULTRA COQ10 ORAL) 100 mg, oral, Once          REVIEW OF SYSTEMS  Review of Systems   All other systems reviewed and are  negative.        VITALS  Vitals:    11/29/23 1429   BP: 136/68   Pulse: 62   SpO2: 98%       PHYSICAL EXAM  Constitutional:       Appearance: Healthy appearance. Not in distress.   Eyes:      Conjunctiva/sclera: Conjunctivae normal.      Pupils: Pupils are equal, round, and reactive to light.   Neck:      Vascular: No JVR. JVD normal.   Pulmonary:      Effort: Pulmonary effort is normal.      Breath sounds: Normal breath sounds. No wheezing. No rhonchi. No rales.   Chest:      Chest wall: Not tender to palpatation.   Cardiovascular:      PMI at left midclavicular line. Normal rate. Regular rhythm. Normal S1. Normal S2.       Murmurs: There is no murmur.      No gallop.  No click. No rub.   Pulses:     Intact distal pulses.   Edema:     Peripheral edema present.     Pretibial: bilateral 1+ edema of the pretibial area.  Abdominal:      Tenderness: There is no abdominal tenderness.   Musculoskeletal: Normal range of motion.         General: No tenderness.      Cervical back: Normal range of motion. Skin:     General: Skin is warm and dry.   Neurological:      General: No focal deficit present.      Mental Status: Alert and oriented to person, place and time.           ASSESSMENT AND PLAN  Diagnoses and all orders for this visit:  CAD S/P percutaneous coronary angioplasty  Benign essential hypertension  Mixed hyperlipidemia  Cardiac pacemaker in situ  Obstructive sleep apnea syndrome  History of gastroesophageal reflux (GERD)  Hypothyroidism, unspecified type  Malignant neoplasm of prostate (CMS/HCC)  Chronic kidney disease due to hypertension  Chronic venous insufficiency  BMI 45.0-49.9, adult (CMS/[facility])  Former cigarette smoker      [unfilled]

## 2024-01-08 ENCOUNTER — HOSPITAL ENCOUNTER (OUTPATIENT)
Dept: RADIOLOGY | Facility: HOSPITAL | Age: 79
Discharge: HOME | End: 2024-01-08
Payer: MEDICARE

## 2024-01-08 ENCOUNTER — TELEPHONE (OUTPATIENT)
Dept: CARDIOLOGY | Facility: CLINIC | Age: 79
End: 2024-01-08

## 2024-01-08 DIAGNOSIS — N13.8 OTHER OBSTRUCTIVE AND REFLUX UROPATHY: ICD-10-CM

## 2024-01-08 DIAGNOSIS — C61 MALIGNANT NEOPLASM OF PROSTATE (MULTI): ICD-10-CM

## 2024-01-08 DIAGNOSIS — N31.8 OTHER NEUROMUSCULAR DYSFUNCTION OF BLADDER: ICD-10-CM

## 2024-01-08 DIAGNOSIS — R97.20 ELEVATED PROSTATE SPECIFIC ANTIGEN (PSA): ICD-10-CM

## 2024-01-08 DIAGNOSIS — E78.2 MIXED HYPERLIPIDEMIA: ICD-10-CM

## 2024-01-08 DIAGNOSIS — N39.41 URGE INCONTINENCE: ICD-10-CM

## 2024-01-08 DIAGNOSIS — N40.1 BENIGN PROSTATIC HYPERPLASIA WITH LOWER URINARY TRACT SYMPTOMS: ICD-10-CM

## 2024-01-08 PROCEDURE — 76770 US EXAM ABDO BACK WALL COMP: CPT | Mod: LIO

## 2024-01-08 PROCEDURE — 76770 US EXAM ABDO BACK WALL COMP: CPT | Mod: LIO | Performed by: STUDENT IN AN ORGANIZED HEALTH CARE EDUCATION/TRAINING PROGRAM

## 2024-01-08 RX ORDER — PRAVASTATIN SODIUM 40 MG/1
40 TABLET ORAL DAILY
Qty: 90 TABLET | Refills: 3 | Status: SHIPPED | OUTPATIENT
Start: 2024-01-08 | End: 2025-01-07

## 2024-01-08 NOTE — TELEPHONE ENCOUNTER
Patient seen end of November with Dr. Slim Diaz MD , at that time recommended to trial holding statin to see if muscle aches improved.     Patient presented to EO office today, states muscle aches slightly better but not completely improved.     Per Dr. Slim Diaz MD - patient to resume Pravastatin and repeat lipid panel before next visit.     Orders placed and sent to provider for signature.

## 2024-01-11 ENCOUNTER — HOSPITAL ENCOUNTER (OUTPATIENT)
Dept: CARDIOLOGY | Facility: HOSPITAL | Age: 79
Discharge: HOME | End: 2024-01-11
Payer: MEDICARE

## 2024-01-11 DIAGNOSIS — Z95.0 CARDIAC PACEMAKER IN SITU: Primary | ICD-10-CM

## 2024-01-11 DIAGNOSIS — I47.20 VENTRICULAR TACHYCARDIA (MULTI): ICD-10-CM

## 2024-01-11 DIAGNOSIS — I44.2 ATRIOVENTRICULAR BLOCK, COMPLETE (MULTI): ICD-10-CM

## 2024-01-11 DIAGNOSIS — I48.0 PAROXYSMAL ATRIAL FIBRILLATION (MULTI): ICD-10-CM

## 2024-01-11 DIAGNOSIS — R55 SYNCOPE DUE TO SICK SINUS SYNDROME (MULTI): ICD-10-CM

## 2024-01-11 DIAGNOSIS — I49.5 SYNCOPE DUE TO SICK SINUS SYNDROME (MULTI): ICD-10-CM

## 2024-01-11 PROCEDURE — 93296 REM INTERROG EVL PM/IDS: CPT

## 2024-01-11 PROCEDURE — 93294 REM INTERROG EVL PM/LDLS PM: CPT | Performed by: INTERNAL MEDICINE

## 2024-01-15 ENCOUNTER — LAB (OUTPATIENT)
Dept: LAB | Facility: LAB | Age: 79
End: 2024-01-15
Payer: MEDICARE

## 2024-01-15 ENCOUNTER — OFFICE VISIT (OUTPATIENT)
Dept: CARDIOLOGY | Facility: CLINIC | Age: 79
End: 2024-01-15
Payer: MEDICARE

## 2024-01-15 VITALS
DIASTOLIC BLOOD PRESSURE: 78 MMHG | HEIGHT: 64 IN | HEART RATE: 60 BPM | WEIGHT: 271.8 LBS | BODY MASS INDEX: 46.4 KG/M2 | SYSTOLIC BLOOD PRESSURE: 154 MMHG

## 2024-01-15 DIAGNOSIS — D64.9 ANEMIA, UNSPECIFIED: ICD-10-CM

## 2024-01-15 DIAGNOSIS — R97.20 ELEVATED PROSTATE SPECIFIC ANTIGEN (PSA): ICD-10-CM

## 2024-01-15 DIAGNOSIS — R73.03 PREDIABETES: ICD-10-CM

## 2024-01-15 DIAGNOSIS — C61 MALIGNANT NEOPLASM OF PROSTATE (MULTI): ICD-10-CM

## 2024-01-15 DIAGNOSIS — N13.8 OTHER OBSTRUCTIVE AND REFLUX UROPATHY: ICD-10-CM

## 2024-01-15 DIAGNOSIS — I25.10 CAD S/P PERCUTANEOUS CORONARY ANGIOPLASTY: ICD-10-CM

## 2024-01-15 DIAGNOSIS — I50.9 CONGESTIVE HEART FAILURE, UNSPECIFIED HF CHRONICITY, UNSPECIFIED HEART FAILURE TYPE (MULTI): ICD-10-CM

## 2024-01-15 DIAGNOSIS — R06.00 DYSPNEA, UNSPECIFIED TYPE: ICD-10-CM

## 2024-01-15 DIAGNOSIS — N31.8 OTHER NEUROMUSCULAR DYSFUNCTION OF BLADDER: ICD-10-CM

## 2024-01-15 DIAGNOSIS — R39.11 HESITANCY OF MICTURITION: ICD-10-CM

## 2024-01-15 DIAGNOSIS — Z98.61 CAD S/P PERCUTANEOUS CORONARY ANGIOPLASTY: ICD-10-CM

## 2024-01-15 DIAGNOSIS — N40.1 BENIGN PROSTATIC HYPERPLASIA WITH LOWER URINARY TRACT SYMPTOMS: ICD-10-CM

## 2024-01-15 DIAGNOSIS — I10 ESSENTIAL (PRIMARY) HYPERTENSION: Primary | ICD-10-CM

## 2024-01-15 DIAGNOSIS — N39.41 URGE INCONTINENCE: Primary | ICD-10-CM

## 2024-01-15 DIAGNOSIS — R39.198 OTHER DIFFICULTIES WITH MICTURITION: ICD-10-CM

## 2024-01-15 DIAGNOSIS — I10 ESSENTIAL (PRIMARY) HYPERTENSION: ICD-10-CM

## 2024-01-15 DIAGNOSIS — E78.2 MIXED HYPERLIPIDEMIA: ICD-10-CM

## 2024-01-15 LAB
ANION GAP SERPL CALC-SCNC: 13 MMOL/L (ref 10–20)
APPEARANCE UR: CLEAR
BASOPHILS # BLD AUTO: 0.13 X10*3/UL (ref 0–0.1)
BASOPHILS NFR BLD AUTO: 2.1 %
BILIRUB UR STRIP.AUTO-MCNC: NEGATIVE MG/DL
BUN SERPL-MCNC: 16 MG/DL (ref 6–23)
CALCIUM SERPL-MCNC: 8.9 MG/DL (ref 8.6–10.3)
CHLORIDE SERPL-SCNC: 105 MMOL/L (ref 98–107)
CHOLEST SERPL-MCNC: 168 MG/DL (ref 0–199)
CHOLESTEROL/HDL RATIO: 5.6
CO2 SERPL-SCNC: 25 MMOL/L (ref 21–32)
COLOR UR: YELLOW
CREAT SERPL-MCNC: 1.29 MG/DL (ref 0.5–1.3)
EGFRCR SERPLBLD CKD-EPI 2021: 57 ML/MIN/1.73M*2
EOSINOPHIL # BLD AUTO: 0.27 X10*3/UL (ref 0–0.4)
EOSINOPHIL NFR BLD AUTO: 4.4 %
ERYTHROCYTE [DISTWIDTH] IN BLOOD BY AUTOMATED COUNT: 13.2 % (ref 11.5–14.5)
EST. AVERAGE GLUCOSE BLD GHB EST-MCNC: 140 MG/DL
FERRITIN SERPL-MCNC: 140 NG/ML (ref 20–300)
FOLATE SERPL-MCNC: 8.4 NG/ML
GLUCOSE SERPL-MCNC: 104 MG/DL (ref 74–99)
GLUCOSE SERPL-MCNC: 104 MG/DL (ref 74–99)
GLUCOSE UR STRIP.AUTO-MCNC: NEGATIVE MG/DL
HBA1C MFR BLD: 6.5 %
HCT VFR BLD AUTO: 42.5 % (ref 41–52)
HDLC SERPL-MCNC: 30 MG/DL
HGB BLD-MCNC: 13.8 G/DL (ref 13.5–17.5)
HGB RETIC QN: 33 PG (ref 28–38)
IMM GRANULOCYTES # BLD AUTO: 0.07 X10*3/UL (ref 0–0.5)
IMM GRANULOCYTES NFR BLD AUTO: 1.1 % (ref 0–0.9)
IMMATURE RETIC FRACTION: 27.9 %
INR PPP: 1 (ref 0.9–1.1)
IRON SATN MFR SERPL: 22 % (ref 25–45)
IRON SERPL-MCNC: 89 UG/DL (ref 35–150)
KETONES UR STRIP.AUTO-MCNC: NEGATIVE MG/DL
LDLC SERPL CALC-MCNC: 87 MG/DL
LEUKOCYTE ESTERASE UR QL STRIP.AUTO: NEGATIVE
LYMPHOCYTES # BLD AUTO: 1.02 X10*3/UL (ref 0.8–3)
LYMPHOCYTES NFR BLD AUTO: 16.7 %
MCH RBC QN AUTO: 28.7 PG (ref 26–34)
MCHC RBC AUTO-ENTMCNC: 32.5 G/DL (ref 32–36)
MCV RBC AUTO: 88 FL (ref 80–100)
MONOCYTES # BLD AUTO: 0.7 X10*3/UL (ref 0.05–0.8)
MONOCYTES NFR BLD AUTO: 11.5 %
NEUTROPHILS # BLD AUTO: 3.91 X10*3/UL (ref 1.6–5.5)
NEUTROPHILS NFR BLD AUTO: 64.2 %
NITRITE UR QL STRIP.AUTO: NEGATIVE
NON HDL CHOLESTEROL: 138 MG/DL (ref 0–149)
NRBC BLD-RTO: 0 /100 WBCS (ref 0–0)
PH UR STRIP.AUTO: 5 [PH]
PLATELET # BLD AUTO: 200 X10*3/UL (ref 150–450)
POTASSIUM SERPL-SCNC: 4.3 MMOL/L (ref 3.5–5.3)
PROT UR STRIP.AUTO-MCNC: NEGATIVE MG/DL
PROTHROMBIN TIME: 11.4 SECONDS (ref 9.8–12.8)
PSA SERPL-MCNC: 0.14 NG/ML
RBC # BLD AUTO: 4.81 X10*6/UL (ref 4.5–5.9)
RBC # UR STRIP.AUTO: NEGATIVE /UL
RETICS #: 0.1 X10*6/UL (ref 0.02–0.11)
RETICS/RBC NFR AUTO: 2 % (ref 0.5–2)
SODIUM SERPL-SCNC: 139 MMOL/L (ref 136–145)
SP GR UR STRIP.AUTO: 1.01
TIBC SERPL-MCNC: 409 UG/DL (ref 240–445)
TRIGL SERPL-MCNC: 254 MG/DL (ref 0–149)
UIBC SERPL-MCNC: 320 UG/DL (ref 110–370)
UROBILINOGEN UR STRIP.AUTO-MCNC: <2 MG/DL
VIT B12 SERPL-MCNC: 401 PG/ML (ref 211–911)
VLDL: 51 MG/DL (ref 0–40)
WBC # BLD AUTO: 6.1 X10*3/UL (ref 4.4–11.3)

## 2024-01-15 PROCEDURE — 85025 COMPLETE CBC W/AUTO DIFF WBC: CPT

## 2024-01-15 PROCEDURE — 84154 ASSAY OF PSA FREE: CPT

## 2024-01-15 PROCEDURE — 3077F SYST BP >= 140 MM HG: CPT | Performed by: INTERNAL MEDICINE

## 2024-01-15 PROCEDURE — 83550 IRON BINDING TEST: CPT

## 2024-01-15 PROCEDURE — 82607 VITAMIN B-12: CPT

## 2024-01-15 PROCEDURE — 80061 LIPID PANEL: CPT

## 2024-01-15 PROCEDURE — 83880 ASSAY OF NATRIURETIC PEPTIDE: CPT

## 2024-01-15 PROCEDURE — 88112 CYTOPATH CELL ENHANCE TECH: CPT

## 2024-01-15 PROCEDURE — 82728 ASSAY OF FERRITIN: CPT

## 2024-01-15 PROCEDURE — 85610 PROTHROMBIN TIME: CPT

## 2024-01-15 PROCEDURE — 3078F DIAST BP <80 MM HG: CPT | Performed by: INTERNAL MEDICINE

## 2024-01-15 PROCEDURE — 82947 ASSAY GLUCOSE BLOOD QUANT: CPT

## 2024-01-15 PROCEDURE — 80048 BASIC METABOLIC PNL TOTAL CA: CPT

## 2024-01-15 PROCEDURE — 82746 ASSAY OF FOLIC ACID SERUM: CPT

## 2024-01-15 PROCEDURE — 84153 ASSAY OF PSA TOTAL: CPT

## 2024-01-15 PROCEDURE — 36415 COLL VENOUS BLD VENIPUNCTURE: CPT

## 2024-01-15 PROCEDURE — 81003 URINALYSIS AUTO W/O SCOPE: CPT

## 2024-01-15 PROCEDURE — 99214 OFFICE O/P EST MOD 30 MIN: CPT | Performed by: INTERNAL MEDICINE

## 2024-01-15 PROCEDURE — 83540 ASSAY OF IRON: CPT

## 2024-01-15 PROCEDURE — 1125F AMNT PAIN NOTED PAIN PRSNT: CPT | Performed by: INTERNAL MEDICINE

## 2024-01-15 PROCEDURE — 83036 HEMOGLOBIN GLYCOSYLATED A1C: CPT

## 2024-01-15 PROCEDURE — 85045 AUTOMATED RETICULOCYTE COUNT: CPT

## 2024-01-15 PROCEDURE — 88112 CYTOPATH CELL ENHANCE TECH: CPT | Performed by: PATHOLOGY

## 2024-01-15 PROCEDURE — 1159F MED LIST DOCD IN RCRD: CPT | Performed by: INTERNAL MEDICINE

## 2024-01-15 PROCEDURE — 1036F TOBACCO NON-USER: CPT | Performed by: INTERNAL MEDICINE

## 2024-01-15 PROCEDURE — 87086 URINE CULTURE/COLONY COUNT: CPT

## 2024-01-15 NOTE — PROGRESS NOTES
CARDIOLOGY OFFICE VISIT      CHIEF COMPLAINT      HISTORY OF PRESENT ILLNESS  The patient states that recently he has not been feeling well.  He states he is going to have some sort of a urologic procedure done next week.  I told him he needs to postpone that until we do some cardiac studies.  He states that he seems to be a lot more fatigued in the past.  He states really for the last year he has been none much in the way of physical activities.  He states he has just been sitting around.  He is now getting more short of breath with activities.  He states that he continues to have the swelling of his lower extremities bilaterally consistent with chronic venous insufficiency.  He has continued to gain weight.  He states she has had a little bit of chest discomfort but nothing severe or bothersome.  He denies palpitations, presyncope and syncope.  I told him I would like to obtain a BNP on the lab work he had done today.  The only  I have back so far from today is his CBC and his hemoglobin is satisfactory.  I told him I will also like to obtain a chest x-ray, walking nuclear stress test with a modified José Antonio protocol and an echocardiogram.  I will see him back after those studies to make some final recommendations and conclusions      IMPRESSION:   1. Morbid obesity  2. Coronary artery disease,   3. Remote multivessel percutaneous coronary intervention.  4. Essential hypertension.  5. Mixed hyperlipidemia.  6. Permanent pacemaker for sinus node dysfunction.  7. Obstructive sleep apnea, being managed with BiPAP.  8. History of gastroesophageal reflux.  9. Hypothyroidism, on replacement therapy.  10. Prostate cancer being treated with radiation  11. Chronic renal insufficiency, stage III, being seen by nephrology  12. Chronic venous insufficiency of lower extremities with bilateral pretibial edema  Significant dyspnea and fatigue with exertion, etiology to be determined    Please excuse any errors in grammar or  translation related to this dictation. Voice recognition software was utilized to prepare this document.       Past Medical History      Social History  Social History     Tobacco Use    Smoking status: Former     Types: Cigarettes    Smokeless tobacco: Never   Vaping Use    Vaping Use: Never used   Substance Use Topics    Alcohol use: Yes     Comment: rare, New Years    Drug use: Never       Family History     Family History   Problem Relation Name Age of Onset    Other (acute myocardial infarction) Mother      Coronary artery disease Mother      Diabetes Mother      Other (ptca) Mother      Coronary artery disease Father      Other (cva) Sister          Allergies:  Allergies   Allergen Reactions    Barium Iodide Rash    Iodinated Contrast Media Rash    Pine Tar Itching and Rash        Outpatient Medications:  Current Outpatient Medications   Medication Instructions    albuterol 90 mcg/actuation inhaler INHALE 2 (TWO) PUFFSQ FOUR - 6 (SIX) HOURS AS NEEDED    amLODIPine (Norvasc) 5 mg tablet 1 tablet, oral, Daily    aspirin 81 mg chewable tablet 1 tablet, oral, Daily    cholecalciferol (Vitamin D-3) 125 MCG (5000 UT) capsule Take 1 capsule (125 mcg) by mouth once daily.    cyanocobalamin, vitamin B-12, 2,500 mcg tablet,chewable Chew 2,500 mcg 1 time.    levothyroxine (Synthroid, Levoxyl) 50 mcg tablet 1 tablet, oral, Daily    levothyroxine (Tirosint) 25 mcg capsule Take 1 tablet by mouth on Monday, Wednesday, ad Friday. Take with 50 mcg tablet    losartan (COZAAR) 50 mg, oral, 2 times daily    metoprolol tartrate (LOPRESSOR) 50 mg, oral, 2 times daily    nitroglycerin (Nitrostat) 0.4 mg SL tablet PLACE 1 TABLET UNDER THE TONGUE EVERY 5 MINUTES FOR UP TO 3 DOSES AS NEEDED FOR CHEST PAIN.CALL 911 IF PAIN PERSISTS.    phenazopyridine (URINARY PAIN RELIEF) 95 mg, oral, As needed    pravastatin (PRAVACHOL) 40 mg, oral, Daily    tamsulosin (Flomax) 0.4 mg 24 hr capsule 1 capsule, oral, Daily    ubidecarenone (ULTRA COQ10  ORAL) 100 mg, oral, Once          REVIEW OF SYSTEMS  Review of Systems   All other systems reviewed and are negative.        VITALS  Vitals:    01/15/24 1553   BP: 154/78   Pulse: 60       PHYSICAL EXAM  Constitutional:       Appearance: Healthy appearance. Not in distress.   Eyes:      Conjunctiva/sclera: Conjunctivae normal.      Pupils: Pupils are equal, round, and reactive to light.   Neck:      Vascular: No JVR. JVD normal.   Pulmonary:      Effort: Pulmonary effort is normal.      Breath sounds: Scattered and bilateral Wheezing present. No rhonchi. No rales.   Chest:      Chest wall: Not tender to palpatation.   Cardiovascular:      PMI at left midclavicular line. Normal rate. Regular rhythm. Normal S1. Normal S2.       Murmurs: There is a grade 1/6 systolic murmur at the apex.      No gallop.  No click. No rub.   Pulses:     Intact distal pulses.   Edema:     Peripheral edema absent.   Abdominal:      Tenderness: There is no abdominal tenderness.   Musculoskeletal: Normal range of motion.         General: No tenderness.      Cervical back: Normal range of motion. Skin:     General: Skin is warm and dry.   Neurological:      General: No focal deficit present.      Mental Status: Alert and oriented to person, place and time.           ASSESSMENT AND PLAN  Diagnoses and all orders for this visit:  Congestive heart failure, unspecified HF chronicity, unspecified heart failure type (CMS/HCC)  Dyspnea, unspecified type  CAD S/P percutaneous coronary angioplasty      [unfilled]

## 2024-01-15 NOTE — PATIENT INSTRUCTIONS
Patient's procedure to be cancelled with Dr. D'Amico.  Will order echocardiogram, modified José Antonio protocol nuclear stress test and chest x-ray prior to further cardiac clearance for procedure.  HOLD your Metoprolol the morning of your stress test  Patient to follow up after testing.    Continue same medications/treatment.  Patient educated on proper medication use.  Please bring all medicines, vitamins and herbal supplements with you when you come to the office.    Marcy COX LPN, am scribing for and in the presence of Dr. Slim Jiang MD, FACC

## 2024-01-16 LAB
BACTERIA UR CULT: NO GROWTH
BNP SERPL-MCNC: 28 PG/ML (ref 0–99)

## 2024-01-17 ENCOUNTER — HOSPITAL ENCOUNTER (OUTPATIENT)
Dept: RADIOLOGY | Facility: HOSPITAL | Age: 79
Discharge: HOME | End: 2024-01-17
Payer: MEDICARE

## 2024-01-17 DIAGNOSIS — R06.00 DYSPNEA, UNSPECIFIED TYPE: ICD-10-CM

## 2024-01-17 DIAGNOSIS — I50.9 CONGESTIVE HEART FAILURE, UNSPECIFIED HF CHRONICITY, UNSPECIFIED HEART FAILURE TYPE (MULTI): ICD-10-CM

## 2024-01-17 LAB
LABORATORY COMMENT REPORT: NORMAL
LABORATORY COMMENT REPORT: NORMAL
PATH REPORT.FINAL DX SPEC: NORMAL
PATH REPORT.GROSS SPEC: NORMAL
PATH REPORT.RELEVANT HX SPEC: NORMAL
PATH REPORT.TOTAL CANCER: NORMAL
PSA FREE MFR SERPL: <50 %
PSA FREE SERPL-MCNC: <0.1 NG/ML
PSA SERPL IA-MCNC: 0.2 NG/ML (ref 0–4)

## 2024-01-17 PROCEDURE — 71046 X-RAY EXAM CHEST 2 VIEWS: CPT | Performed by: RADIOLOGY

## 2024-01-17 PROCEDURE — 71046 X-RAY EXAM CHEST 2 VIEWS: CPT

## 2024-01-22 NOTE — TELEPHONE ENCOUNTER
----- Message from Marcy Alvarenga LPN sent at 1/18/2024  5:30 PM EST -----    ----- Message -----  From: Slim Jiang MD  Sent: 1/18/2024   4:24 PM EST  To: Marcy Alvarenga LPN    Chest x-ray okay  ----- Message -----  From: Interface, Radiology Results In  Sent: 1/18/2024   3:54 PM EST  To: Slim Jiang MD

## 2024-01-26 ENCOUNTER — APPOINTMENT (OUTPATIENT)
Dept: CARDIOLOGY | Facility: CLINIC | Age: 79
End: 2024-01-26
Payer: MEDICARE

## 2024-01-30 ENCOUNTER — APPOINTMENT (OUTPATIENT)
Dept: CARDIOLOGY | Facility: CLINIC | Age: 79
End: 2024-01-30
Payer: MEDICARE

## 2024-01-30 ENCOUNTER — APPOINTMENT (OUTPATIENT)
Dept: RADIOLOGY | Facility: CLINIC | Age: 79
End: 2024-01-30
Payer: MEDICARE

## 2024-01-30 ENCOUNTER — HOSPITAL ENCOUNTER (OUTPATIENT)
Dept: CARDIOLOGY | Facility: CLINIC | Age: 79
End: 2024-01-30
Payer: MEDICARE

## 2024-01-30 ENCOUNTER — HOSPITAL ENCOUNTER (OUTPATIENT)
Dept: RADIOLOGY | Facility: CLINIC | Age: 79
Discharge: HOME | End: 2024-01-30
Payer: MEDICARE

## 2024-01-30 ENCOUNTER — HOSPITAL ENCOUNTER (OUTPATIENT)
Dept: CARDIOLOGY | Facility: CLINIC | Age: 79
Discharge: HOME | End: 2024-01-30
Payer: MEDICARE

## 2024-01-30 ENCOUNTER — DOCUMENTATION (OUTPATIENT)
Dept: RADIOLOGY | Facility: CLINIC | Age: 79
End: 2024-01-30
Payer: MEDICARE

## 2024-01-30 VITALS
HEIGHT: 64 IN | SYSTOLIC BLOOD PRESSURE: 160 MMHG | DIASTOLIC BLOOD PRESSURE: 70 MMHG | BODY MASS INDEX: 46.26 KG/M2 | WEIGHT: 271 LBS

## 2024-01-30 DIAGNOSIS — I25.10 CAD S/P PERCUTANEOUS CORONARY ANGIOPLASTY: ICD-10-CM

## 2024-01-30 DIAGNOSIS — R06.00 DYSPNEA, UNSPECIFIED TYPE: ICD-10-CM

## 2024-01-30 DIAGNOSIS — I50.9 CONGESTIVE HEART FAILURE, UNSPECIFIED HF CHRONICITY, UNSPECIFIED HEART FAILURE TYPE (MULTI): ICD-10-CM

## 2024-01-30 DIAGNOSIS — Z98.61 CAD S/P PERCUTANEOUS CORONARY ANGIOPLASTY: ICD-10-CM

## 2024-01-30 LAB
AORTIC VALVE MEAN GRADIENT: 4 MMHG
AORTIC VALVE PEAK VELOCITY: 1.32 M/S
AV PEAK GRADIENT: 7 MMHG
AVA (PEAK VEL): 2.71 CM2
AVA (VTI): 2.71 CM2
EJECTION FRACTION APICAL 4 CHAMBER: 62
EJECTION FRACTION: 60 %
LEFT VENTRICLE INTERNAL DIMENSION DIASTOLE: 4.8 CM (ref 3.5–6)
LEFT VENTRICULAR OUTFLOW TRACT DIAMETER: 2 CM
MITRAL VALVE E/A RATIO: 0.73
MITRAL VALVE E/E' RATIO: 7.7
RIGHT VENTRICLE PEAK SYSTOLIC PRESSURE: 35.9 MMHG

## 2024-01-30 PROCEDURE — 93306 TTE W/DOPPLER COMPLETE: CPT | Performed by: INTERNAL MEDICINE

## 2024-01-30 PROCEDURE — 93017 CV STRESS TEST TRACING ONLY: CPT

## 2024-01-30 PROCEDURE — 78452 HT MUSCLE IMAGE SPECT MULT: CPT

## 2024-01-30 PROCEDURE — 3430000001 HC RX 343 DIAGNOSTIC RADIOPHARMACEUTICALS: Performed by: INTERNAL MEDICINE

## 2024-01-30 PROCEDURE — A9502 TC99M TETROFOSMIN: HCPCS | Performed by: INTERNAL MEDICINE

## 2024-01-30 PROCEDURE — 93306 TTE W/DOPPLER COMPLETE: CPT

## 2024-01-30 RX ADMIN — TETROFOSMIN 35.3 MILLICURIE: 0.23 INJECTION, POWDER, LYOPHILIZED, FOR SOLUTION INTRAVENOUS at 13:18

## 2024-01-30 NOTE — PROGRESS NOTES
01/30/2024    Pt here for nuclear stress test. Procedure explained to pt. Pt was explained that if pt doesn't get his 85% MHR on the treadmill that in order to get a good test we would switch over to lexiscan to properly dilate the blood vessels. Lexiscan side effects explained and pt was informed we have a reversal agent if needed and a good test is still received. Per Dr Leon note pt was to be put on a modified otto, but he has a PPM so he still may not get his needed HR. Pt did not receive his 85% MHR in his last nuclear stress test, but this one is for pre-op clearance for a urology procedure. It was explained to the pt that if he doesn't get his HR - medication was needed for a good test, but the pt continued to refuse and stated he did not do well with it one time. He was educated that he may have received the older medication that is no longer used and lexiscan only lasts about 2 minutes then the effects go away. Pt continued to refuse. Dr Mao was informed and stated he cannot answer what to do and Dr Diaz needs to be talked to. Attempted to page Dr Diaz but he was scrubbed in at the cath lab. Until Dr Diaz is informed with what is happening (tomorrow when he is in office) for instructions on the next steps, the pt was rested today for his first set of images.     Keren AHUMADA

## 2024-01-31 ENCOUNTER — HOSPITAL ENCOUNTER (OUTPATIENT)
Dept: RADIOLOGY | Facility: CLINIC | Age: 79
Discharge: HOME | End: 2024-01-31
Payer: MEDICARE

## 2024-01-31 ENCOUNTER — APPOINTMENT (OUTPATIENT)
Dept: RADIOLOGY | Facility: CLINIC | Age: 79
End: 2024-01-31
Payer: MEDICARE

## 2024-01-31 ENCOUNTER — HOSPITAL ENCOUNTER (OUTPATIENT)
Dept: CARDIOLOGY | Facility: CLINIC | Age: 79
Discharge: HOME | End: 2024-01-31
Payer: MEDICARE

## 2024-01-31 DIAGNOSIS — Z98.61 CAD S/P PERCUTANEOUS CORONARY ANGIOPLASTY: ICD-10-CM

## 2024-01-31 DIAGNOSIS — I25.10 CAD S/P PERCUTANEOUS CORONARY ANGIOPLASTY: ICD-10-CM

## 2024-01-31 DIAGNOSIS — R06.00 DYSPNEA: Primary | ICD-10-CM

## 2024-01-31 PROCEDURE — 3430000001 HC RX 343 DIAGNOSTIC RADIOPHARMACEUTICALS: Performed by: INTERNAL MEDICINE

## 2024-01-31 PROCEDURE — A9502 TC99M TETROFOSMIN: HCPCS | Performed by: INTERNAL MEDICINE

## 2024-01-31 PROCEDURE — 2500000004 HC RX 250 GENERAL PHARMACY W/ HCPCS (ALT 636 FOR OP/ED): Performed by: INTERNAL MEDICINE

## 2024-01-31 RX ORDER — REGADENOSON 0.08 MG/ML
0.4 INJECTION, SOLUTION INTRAVENOUS ONCE
Status: COMPLETED | OUTPATIENT
Start: 2024-01-31 | End: 2024-01-31

## 2024-01-31 RX ADMIN — REGADENOSON 0.4 MG: 0.08 INJECTION, SOLUTION INTRAVENOUS at 13:18

## 2024-01-31 RX ADMIN — TETROFOSMIN 35.3 MILLICURIE: 0.23 INJECTION, POWDER, LYOPHILIZED, FOR SOLUTION INTRAVENOUS at 13:18

## 2024-02-07 ENCOUNTER — OFFICE VISIT (OUTPATIENT)
Dept: CARDIOLOGY | Facility: CLINIC | Age: 79
End: 2024-02-07
Payer: MEDICARE

## 2024-02-07 VITALS
WEIGHT: 273 LBS | SYSTOLIC BLOOD PRESSURE: 142 MMHG | BODY MASS INDEX: 46.86 KG/M2 | HEART RATE: 60 BPM | DIASTOLIC BLOOD PRESSURE: 74 MMHG

## 2024-02-07 DIAGNOSIS — G47.33 OBSTRUCTIVE SLEEP APNEA SYNDROME: ICD-10-CM

## 2024-02-07 DIAGNOSIS — I49.5 SSS (SICK SINUS SYNDROME) (MULTI): ICD-10-CM

## 2024-02-07 DIAGNOSIS — I25.10 CAD S/P PERCUTANEOUS CORONARY ANGIOPLASTY: ICD-10-CM

## 2024-02-07 DIAGNOSIS — N18.30 STAGE 3 CHRONIC KIDNEY DISEASE, UNSPECIFIED WHETHER STAGE 3A OR 3B CKD (MULTI): ICD-10-CM

## 2024-02-07 DIAGNOSIS — C61 MALIGNANT NEOPLASM OF PROSTATE (MULTI): ICD-10-CM

## 2024-02-07 DIAGNOSIS — Z87.891 FORMER CIGARETTE SMOKER: ICD-10-CM

## 2024-02-07 DIAGNOSIS — Z98.61 CAD S/P PERCUTANEOUS CORONARY ANGIOPLASTY: ICD-10-CM

## 2024-02-07 DIAGNOSIS — R06.00 DYSPNEA, UNSPECIFIED TYPE: ICD-10-CM

## 2024-02-07 DIAGNOSIS — I10 BENIGN ESSENTIAL HYPERTENSION: ICD-10-CM

## 2024-02-07 DIAGNOSIS — I87.2 CHRONIC VENOUS INSUFFICIENCY: ICD-10-CM

## 2024-02-07 DIAGNOSIS — Z95.0 CARDIAC PACEMAKER IN SITU: ICD-10-CM

## 2024-02-07 DIAGNOSIS — E78.2 MIXED HYPERLIPIDEMIA: ICD-10-CM

## 2024-02-07 DIAGNOSIS — Z87.19 HISTORY OF GASTROESOPHAGEAL REFLUX (GERD): ICD-10-CM

## 2024-02-07 PROCEDURE — 1036F TOBACCO NON-USER: CPT | Performed by: INTERNAL MEDICINE

## 2024-02-07 PROCEDURE — 3077F SYST BP >= 140 MM HG: CPT | Performed by: INTERNAL MEDICINE

## 2024-02-07 PROCEDURE — 1159F MED LIST DOCD IN RCRD: CPT | Performed by: INTERNAL MEDICINE

## 2024-02-07 PROCEDURE — 3078F DIAST BP <80 MM HG: CPT | Performed by: INTERNAL MEDICINE

## 2024-02-07 PROCEDURE — 1125F AMNT PAIN NOTED PAIN PRSNT: CPT | Performed by: INTERNAL MEDICINE

## 2024-02-07 PROCEDURE — 99214 OFFICE O/P EST MOD 30 MIN: CPT | Performed by: INTERNAL MEDICINE

## 2024-02-07 NOTE — PROGRESS NOTES
CARDIOLOGY OFFICE VISIT      CHIEF COMPLAINT      HISTORY OF PRESENT ILLNESS  The patient is being seen after his office visit from 1/15/2024.  Please see that note for complete details.  The patient had chest x-ray performed which demonstrated no active disease.  His hemoglobin is 13.8.  His BNP is normal.  His echocardiogram demonstrates normal left ventricular systolic function and no significant valvular abnormalities.  His Lexiscan nuclear stress test demonstrates no evidence of myocardial ischemia.  There was a small fixed defect in the inferior apical area.  I believe this is due to diaphragmatic attenuation.  I doubt this is due to infarction.  His left ventricular ejection fraction is normal.  I explained to him that his tests are satisfactory and he can go ahead with his needed urologic procedure.  I told him to hold his aspirin for 1 week prior to that.  His hemoglobin A1c was elevated at 6.5.  I recommend he see his family physician who is going to see apparently the beginning of March about may be getting on Ozempic for his diabetes and see if that will help him lose some weight.  I think all his symptoms are due to his significant weight gain and lack of physical activities.      IMPRESSION:   1. Morbid obesity  2. Coronary artery disease,   3. Remote multivessel percutaneous coronary intervention.  4. Essential hypertension.  5. Mixed hyperlipidemia.  6. Permanent pacemaker for sinus node dysfunction.  7. Obstructive sleep apnea, being managed with BiPAP.  8. History of gastroesophageal reflux.  9. Hypothyroidism, on replacement therapy.  10. Prostate cancer being treated with radiation  11. Chronic renal insufficiency, stage III, being seen by nephrology  12. Chronic venous insufficiency of lower extremities with bilateral pretibial edema      Patient's cardiac status is stable at this time.  He is a satisfactory risk from a cardiac standpoint for his upcoming needed urologic procedure.  I told him  to hold his aspirin for 1 week prior to the procedure.     Please excuse any errors in grammar or translation related to this dictation. Voice recognition software was utilized to prepare this document.         Past Medical History  Past Medical History:   Diagnosis Date    Anemia     Anxiety     Arthritis     Atrial arrhythmia     Atrial fibrillation (CMS/HCC)     CKD (chronic kidney disease)     Stage 2 d/t DM2    Constipation     Coronary artery disease     Depression     Diabetes mellitus (CMS/HCC)     no medication; elevation of HgA1C    Diverticulosis     Dizziness     GERD (gastroesophageal reflux disease)     History of morbid obesity     History of OCD (obsessive compulsive disorder)     Hypertension     Hypothyroidism     Joint pain     lower back    Prostate cancer (CMS/HCC)     Rectal pain     Second degree AV block     Shortness of breath     use of inhaler if needed    Sleep apnea     uses BiPAP    Syncope     Vertigo     Wears glasses        Social History  Social History     Tobacco Use    Smoking status: Former     Types: Cigarettes    Smokeless tobacco: Never   Vaping Use    Vaping Use: Never used   Substance Use Topics    Alcohol use: Yes     Comment: rare, New Years    Drug use: Never       Family History     Family History   Problem Relation Name Age of Onset    Other (acute myocardial infarction) Mother      Coronary artery disease Mother      Diabetes Mother      Other (ptca) Mother      Coronary artery disease Father      Other (cva) Sister          Allergies:  Allergies   Allergen Reactions    Barium Iodide Rash    Pine Tar Itching and Rash        Outpatient Medications:  Current Outpatient Medications   Medication Instructions    albuterol 90 mcg/actuation inhaler INHALE 2 (TWO) PUFFSQ FOUR - 6 (SIX) HOURS AS NEEDED    amLODIPine (Norvasc) 5 mg tablet 1 tablet, oral, Daily    aspirin 81 mg chewable tablet 1 tablet, oral, Daily    cholecalciferol (Vitamin D-3) 125 MCG (5000 UT) capsule Take  1 capsule (125 mcg) by mouth once daily.    cyanocobalamin, vitamin B-12, 2,500 mcg tablet,chewable Chew 2,500 mcg 1 time.    levothyroxine (Synthroid, Levoxyl) 50 mcg tablet 1 tablet, oral, Daily    levothyroxine (Tirosint) 25 mcg capsule Take 1 tablet by mouth on Monday, Wednesday, ad Friday. Take with 50 mcg tablet    losartan (COZAAR) 50 mg, oral, 2 times daily    metoprolol tartrate (LOPRESSOR) 50 mg, oral, 2 times daily    nitroglycerin (Nitrostat) 0.4 mg SL tablet PLACE 1 TABLET UNDER THE TONGUE EVERY 5 MINUTES FOR UP TO 3 DOSES AS NEEDED FOR CHEST PAIN.CALL 911 IF PAIN PERSISTS.    phenazopyridine (URINARY PAIN RELIEF) 95 mg, oral, As needed    pravastatin (PRAVACHOL) 40 mg, oral, Daily    tamsulosin (Flomax) 0.4 mg 24 hr capsule 1 capsule, oral, Daily    ubidecarenone (ULTRA COQ10 ORAL) 100 mg, oral, Once          REVIEW OF SYSTEMS  Review of Systems   All other systems reviewed and are negative.        VITALS  Vitals:    02/07/24 1526   BP: 142/74   Pulse: 60       PHYSICAL EXAM  Constitutional:       Appearance: Healthy appearance. Not in distress.   Eyes:      Conjunctiva/sclera: Conjunctivae normal.      Pupils: Pupils are equal, round, and reactive to light.   Neck:      Vascular: No JVR. JVD normal.   Pulmonary:      Effort: Pulmonary effort is normal.      Breath sounds: Normal breath sounds. No wheezing. No rhonchi. No rales.   Chest:      Chest wall: Not tender to palpatation.   Cardiovascular:      PMI at left midclavicular line. Normal rate. Regular rhythm. Normal S1. Normal S2.       Murmurs: There is no murmur.      No gallop.  No click. No rub.   Pulses:     Intact distal pulses.   Edema:     Peripheral edema absent.   Abdominal:      Tenderness: There is no abdominal tenderness.   Musculoskeletal: Normal range of motion.         General: No tenderness.      Cervical back: Normal range of motion. Skin:     General: Skin is warm and dry.   Neurological:      General: No focal deficit present.       Mental Status: Alert and oriented to person, place and time.           ASSESSMENT AND PLAN  Diagnoses and all orders for this visit:  CAD S/P percutaneous coronary angioplasty  Benign essential hypertension  Mixed hyperlipidemia  Cardiac pacemaker in situ  SSS (sick sinus syndrome) (CMS/[facility])  Obstructive sleep apnea syndrome  History of gastroesophageal reflux (GERD)  Malignant neoplasm of prostate (CMS/[facility])  Stage 3 chronic kidney disease, unspecified whether stage 3a or 3b CKD (CMS/[facility])  Chronic venous insufficiency  Dyspnea, unspecified type  BMI 45.0-49.9, adult (CMS/[facility])  Former cigarette smoker      [unfilled]

## 2024-02-07 NOTE — PATIENT INSTRUCTIONS
Patient is an acceptable cardiac risk for prostate surgery.  Patient can hold ASA 1 week prior and resume following.    Follow up office visit in 6 months.    Continue same medications/treatment.  Patient educated on proper medication use.  Please bring all medicines, vitamins and herbal supplements with you when you come to the office.    Marcy COX LPN, am scribing for and in the presence of Dr. Slim Jiang MD, FACC

## 2024-02-19 ENCOUNTER — HOSPITAL ENCOUNTER (OUTPATIENT)
Dept: RADIATION ONCOLOGY | Facility: CLINIC | Age: 79
Setting detail: RADIATION/ONCOLOGY SERIES
Discharge: HOME | End: 2024-02-19
Payer: MEDICARE

## 2024-02-19 VITALS
DIASTOLIC BLOOD PRESSURE: 84 MMHG | HEART RATE: 72 BPM | SYSTOLIC BLOOD PRESSURE: 158 MMHG | BODY MASS INDEX: 46.31 KG/M2 | WEIGHT: 269.8 LBS | TEMPERATURE: 97 F | OXYGEN SATURATION: 97 % | RESPIRATION RATE: 18 BRPM

## 2024-02-19 DIAGNOSIS — C61 MALIGNANT NEOPLASM OF PROSTATE (MULTI): Primary | ICD-10-CM

## 2024-02-19 DIAGNOSIS — R33.9 INCOMPLETE BLADDER EMPTYING: ICD-10-CM

## 2024-02-19 PROCEDURE — 99214 OFFICE O/P EST MOD 30 MIN: CPT | Performed by: RADIOLOGY

## 2024-02-19 ASSESSMENT — PAIN SCALES - GENERAL: PAINLEVEL: 0-NO PAIN

## 2024-02-19 NOTE — PROGRESS NOTES
Radiation Oncology Follow-Up    Patient Name:  Sid Farmer  MRN:  [id number]  :  1945    Referring Provider: No ref. provider found  Primary Care Provider: Joanne Thornton MD  Care Team: Patient Care Team:  Joanne Thornton MD as PCP - General (Internal Medicine)  Slim Jiang MD as Cardiologist (Cardiology)  Louis D'Amico, MD as Surgeon (Urology)    Date of Service: 2024     SUMMARY: High risk prostate cancer, PSA 7.9, [place] 4+4=8 without PNI, involving 8/13 cores. Most of the positive specimens came from MRI directed fusion biopsies. Clinical stage [id number]. 41cc gland by ultrasound volume study. Axumin PET/CT negative for metastases.     I-125 seed implant boost, 115 Camara, implanted on 2021 with placement of SpaceOAR gel after the procedure. He then received external beam radiotherapy, 25 fractions at 1.8 Gray each, treating the pelvic lymph nodes electively, 45Gy total. External beam RT completed on 10/13/2021.     SUBJECTIVE  History of Present Illness:   Sid Farmer is a 78 y.o. male who is presenting today for follow-up. Prior radiation treatment as follows:    Radiation Treatments       No radiation treatments to show. (Treatments may have been administered in another system.)            INTERIM HISTORY: Pt reports having some issues with hematuria which is being addressed by D'Amico. He has a cystoscopy coming up     Most recent PSA     PSA   Date Value Ref Range Status   01/15/2024 0.2 0.0 - 4.0 ng/mL Final     Comment:     INTERPRETIVE INFORMATION: Prostate Specific Antigen    The Roche PSA electrochemiluminescent immunoassay is used. Results   obtained with different test methods or kits cannot be used   interchangeably. The Roche PSA method is approved for use as an   aid in the detection of prostate cancer when used in conjunction   with a digital rectal exam in individuals with a prostate age 50   years and older. The Roche PSA is also indicated for the  serial   measurement of PSA to aid in the prognosis and management of   prostate cancer patients. Elevated PSA concentrations can only   suggest the presence of prostate cancer until biopsy is performed.   PSA concentrations can also be elevated in benign prostatic   hyperplasia or inflammatory conditions of the prostate. PSA is   generally not elevated in healthy individuals or individuals with   nonprostatic carcinoma.     Prostate Specific AG   Date Value Ref Range Status   01/15/2024 0.14 <=4.00 ng/mL Final     PSA, Free   Date Value Ref Range Status   01/15/2024 <0.1 ng/mL Final     PSA, Free Pct   Date Value Ref Range Status   01/15/2024 <50 % Final     Comment:     INTERPRETIVE INFORMATION: Prostate Specific Antigen, Free                            Percentage    [employer] uses the Roche Free PSA electrochemiluminescent immunoassay   method in conjunction with the Roche PSA electrochemiluminescent   immunoassay method to determine the free PSA percentage. Values   obtained with different assay methods should not be used   interchangeably. The free PSA percentage is an aid in   distinguishing prostate cancer from benign prostatic conditions in   individuals with a prostate age 50 years and older with a total   PSA between 3 and 10 ng/mL and negative digital rectal examination   findings. Prostatic biopsy is required for the diagnosis of   cancer.     In patients with total PSA concentrations of 4-10 ng/mL, the   probability of finding prostate cancer on needle biopsy by age in   years is:    %fPSA               50-59    60-69    70 or older  0  - 10%            49%      58%      65%  11 - 18%            27%      34%      41%  19 - 25%            18%      24%      30%  Greater than 25%     9%      12%      16%    Other factors may help determine the actual risk of prostate   cancer in individual patients.  Performed By: [facility]  [address] [place] [id number]  : Dar BEJARANO  MD Rohit, PhD  [facility] Number: [id number]       SUBJECTIVE:    Today, the patient reports feeling well overall. He denies changes in his symptoms.  His sexual Health Inventory for Men score (MELISSA) is 1. His International Prostate Symptom Score (IPSS) score is 15. He denies diarrhea or constipation. . He denies bone pain.    Review of Systems:   Review of Systems   Constitutional:  Positive for fatigue. Negative for appetite change, chills, diaphoresis, fever and unexpected weight change.   HENT:  Negative.     Eyes:  Positive for eye problems. Negative for icterus.        Worsening vision   Respiratory:  Positive for chest tightness and shortness of breath. Negative for cough, hemoptysis and wheezing.         Recent stress test. Has Pacemaker   Cardiovascular:  Positive for leg swelling. Negative for chest pain and palpitations.   Gastrointestinal:  Positive for abdominal pain and constipation. Negative for diarrhea, nausea, rectal pain and vomiting.        Sharp abdominal pain occasionally   Genitourinary:  Positive for difficulty urinating, frequency and nocturia. Negative for bladder incontinence, dyspareunia, dysuria, hematuria, pelvic pain and penile discharge.         Sees Dr. D'amico in urology. Scheduled for cystoscopy 3/8   Musculoskeletal:  Positive for arthralgias, back pain, gait problem and neck pain. Negative for flank pain, myalgias and neck stiffness.   Skin: Negative.    Neurological:  Positive for dizziness, extremity weakness, gait problem, light-headedness and numbness. Negative for headaches, seizures and speech difficulty.   Hematological: Negative.    Psychiatric/Behavioral:  Positive for confusion and decreased concentration. Negative for depression, sleep disturbance and suicidal ideas. The patient is nervous/anxious.         States OCD diagnosis.     The patient's current pain level was assessed.  They report currently having a pain of 4 out of 10.  They feel their pain is not under control  without the use of pain medications.    Performance Status:   The Karnofsky performance scale today is 80, Normal activity with effort; some signs or symptoms of disease (ECOG equivalent 1).         OBJECTIVE  Vital Signs:  /84 (BP Location: Right arm, Patient Position: Sitting, BP Cuff Size: Large adult)   Pulse 72   Temp 36.1 °C (97 °F) (Temporal)   Resp 18   Wt 122 kg (269 lb 12.8 oz)   SpO2 97%   BMI 46.31 kg/m²    Physical Exam:  Constitutional: Awake, alert and oriented. No acute distress. Appears stated age.  Eyes: Conjunctivae are clear without exudates or hemorrhage. Eyelids are normal in appearance without swelling or lesions.  ENMT: mucous membranes moist, no apparent injury, no lesions seen  Neck: Neck supple, no apparent injury, trachea midline  Resp/Thorax: Patent airways,  good chest expansion. Thorax symmetric  Cardiovascular: The external chest is normal without lifts, heaves, or thrills. PMI is not visible.  GI: Nondistended, soft, non-tender.  /Gyn: Deferred  MSK: No tenderness noted on palpation of the spine. No ROM limitations.  Extremities: normal extremities, no cyanosis, erythema or edema.  Neurological: alert and oriented x3. Sensory and motor function appear intact. No gait abnormality observed.  Psych: Appropriate mood and behavior.  Skin: Warm and dry, no new lesions or rashes.           PSA   Date Value Ref Range Status   01/15/2024 0.2 0.0 - 4.0 ng/mL Final     Comment:     INTERPRETIVE INFORMATION: Prostate Specific Antigen    The Roche PSA electrochemiluminescent immunoassay is used. Results   obtained with different test methods or kits cannot be used   interchangeably. The Roche PSA method is approved for use as an   aid in the detection of prostate cancer when used in conjunction   with a digital rectal exam in individuals with a prostate age 50   years and older. The Roche PSA is also indicated for the serial   measurement of PSA to aid in the prognosis and  management of   prostate cancer patients. Elevated PSA concentrations can only   suggest the presence of prostate cancer until biopsy is performed.   PSA concentrations can also be elevated in benign prostatic   hyperplasia or inflammatory conditions of the prostate. PSA is   generally not elevated in healthy individuals or individuals with   nonprostatic carcinoma.   09/19/2023 0.08 0.00 - 4.00 ng/mL Final     Comment:     The FDA requires that the method used for PSA assay be   reported to the physician. Values obtained with different   assay methods must not be used interchangeably. This test  was performed at [facility] using the Access   Hybritech PSA assay is a two-site immunoenzymatic sandwich   assay. The assay is approved for measurement of   prostate-specific antigen (PSA)in serum and may be used   in conjunction with a digital rectal examination in men   50 years and older as an aid in detection of prostate   cancer.  5-Alpha-reductase inhibitors (e.g. Proscar, Finasteride,   Avodart, Dutasteride and Amelie) for the treatment of BPH   have been shown to lower PSA levels by an average of 50%   after 6 months of treatment.     01/09/2023 <0.1 0.0 - 4.0 ng/mL Final     Comment:     INTERPRETIVE INFORMATION: Prostate Specific Antigen  The Roche PSA electrochemiluminescent immunoassay is used. Results   obtained with different test methods or kits cannot be used   interchangeably. The Roche PSA method is approved for use as an   aid in the detection of prostate cancer when used in conjunction   with a digital rectal exam in individuals with a prostate age 50   years and older. The Roche PSA is also indicated for the serial   measurement of PSA to aid in the prognosis and management of   prostate cancer patients. Elevated PSA concentrations can only   suggest the presence of prostate cancer until biopsy is performed.   PSA concentrations can also be elevated in benign prostatic   hyperplasia or  inflammatory conditions of the prostate. PSA is   generally not elevated in healthy individuals or individuals with   nonprostatic carcinoma.     07/14/2022 <0.1 0.0 - 4.0 ng/mL Final     Comment:     INTERPRETIVE INFORMATION: Prostate Specific Antigen  The Roche PSA electrochemiluminescent immunoassay is used. Results   obtained with different test methods or kits cannot be used   interchangeably. The Roche PSA method is approved for use as an   aid in the detection of prostate cancer when used in conjunction   with a digital rectal exam in individuals with a prostate age 50   years and older. The Roche PSA is also indicated for the serial   measurement of PSA to aid in the prognosis and management of   prostate cancer patients. Elevated PSA concentrations can only   suggest the presence of prostate cancer until biopsy is performed.   PSA concentrations can also be elevated in benign prostatic   hyperplasia or inflammatory conditions of the prostate. PSA is   generally not elevated in healthy individuals or individuals with   nonprostatic carcinoma.     12/22/2021 <0.10 0.00 - 4.00 ng/mL Final     Comment:     The FDA requires that the method used for PSA assay be   reported to the physician. Values obtained with different   assay methods must not be used interchangeably. This test  was performed at [facility] using the Access   Hybritech PSA assay is a two-site immunoenzymatic sandwich   assay. The assay is approved for measurement of   prostate-specific antigen (PSA)in serum and may be used   in conjunction with a digital rectal examination in men   50 years and older as an aid in detection of prostate   cancer.  5-Alpha-reductase inhibitors (e.g. Proscar, Finasteride,   Avodart, Dutasteride and Amelie) for the treatment of BPH   have been shown to lower PSA levels by an average of 50%   after 6 months of treatment.     06/16/2021 0.30 0.00 - 4.00 ng/mL Final     Comment:     The FDA requires that  the method used for PSA assay be   reported to the physician. Values obtained with different   assay methods must not be used interchangeably. This test  was performed at [facility] using the Access   Dashwirebritech PSA assay is a two-site immunoenzymatic sandwich   assay. The assay is approved for measurement of   prostate-specific antigen (PSA)in serum and may be used   in conjunction with a digital rectal examination in men   50 years and older as an aid in detection of prostate   cancer.  5-Alpha-reductase inhibitors (e.g. Proscar, Finasteride,   Avodart, Dutasteride and Amelie) for the treatment of BPH   have been shown to lower PSA levels by an average of 50%   after 6 months of treatment.     07/08/2020 7.9 (H) 0.0 - 4.0 ng/mL Final     Comment:     INTERPRETIVE INFORMATION: Prostate Specific Antigen  The Roche PSA electrochemiluminescent immunoassay was used.   Results obtained with different test methods or kits cannot be   used interchangeably. The Roche PSA method is approved for use as   an aid in the detection of prostate cancer when used in   conjunction with a digital rectal exam in men age 50 and older.   The Roche PSA method is also indicated for the serial measurement   of PSA to aid in the prognosis and management of prostate cancer   patients. Elevated PSA concentrations can only suggest the   presence of prostate cancer until biopsy is performed. PSA   concentrations can also be elevated in benign prostatic   hyperplasia or inflammatory conditions of the prostate. PSA is   generally not elevated in healthy men or men with non-prostatic   carcinoma.     01/07/2020 4.20 (H) 0.00 - 4.00 ng/mL Final     Comment:     The FDA requires that the method used for PSA assay be   reported to the physician. Values obtained with different   assay methods must not be used interchangeably. This test  was performed at [facility] using the Access   Hybritech PSA assay is a two-site  immunoenzymatic sandwich   assay. The assay is approved for measurement of   prostate-specific antigen (PSA)in serum and may be used   in conjunction with a digital rectal examination in men   50 years and older as an aid in detection of prostate   cancer.  5-Alpha-reductase inhibitors (e.g. Proscar, Finasteride,   Avodart, Dutasteride and Amelie) for the treatment of BPH   have been shown to lower PSA levels by an average of 50%   after 6 months of treatment.       Prostate Specific AG   Date Value Ref Range Status   01/15/2024 0.14 <=4.00 ng/mL Final     PSA, Free   Date Value Ref Range Status   01/15/2024 <0.1 ng/mL Final   01/09/2023 <0.1 ng/mL Final   07/14/2022 <0.1 ng/mL Final   07/08/2020 2.1 ng/mL Final     PSA, Free Pct   Date Value Ref Range Status   01/15/2024 <50 % Final     Comment:     INTERPRETIVE INFORMATION: Prostate Specific Antigen, Free                            Percentage    [facility] uses the Roche Free PSA electrochemiluminescent immunoassay   method in conjunction with the Roche PSA electrochemiluminescent   immunoassay method to determine the free PSA percentage. Values   obtained with different assay methods should not be used   interchangeably. The free PSA percentage is an aid in   distinguishing prostate cancer from benign prostatic conditions in   individuals with a prostate age 50 years and older with a total   PSA between 3 and 10 ng/mL and negative digital rectal examination   findings. Prostatic biopsy is required for the diagnosis of   cancer.     In patients with total PSA concentrations of 4-10 ng/mL, the   probability of finding prostate cancer on needle biopsy by age in   years is:    %fPSA               50-59    60-69    70 or older  0  - 10%            49%      58%      65%  11 - 18%            27%      34%      41%  19 - 25%            18%      24%      30%  Greater than 25%     9%      12%      16%    Other factors may help determine the actual risk of prostate   cancer in  individual patients.  Performed By: [email or web address]  [address] [place] [address]  : Dar Bee MD, PhD  [facility] Number: [id number]   01/09/2023 See Note % Final     Comment:      The free PSA percentage cannot be calculated due to the low PSA   value. Measurement of the free PSA percentage is recommended for   samples with PSA values between 3 and 10 ng/mL.  INTERPRETIVE INFORMATION: Prostate Specific Antigen, Free                            Percentage  [facility] uses the Roche Free PSA electrochemiluminescent immunoassay   method in conjunction with the Roche PSA electrochemiluminescent   immunoassay method to determine the free PSA percentage. Values   obtained with different assay methods should not be used   interchangeably. The free PSA percentage is an aid in   distinguishing prostate cancer from benign prostatic conditions in   individuals with a prostate age 50 years and older with a total   PSA between 3 and 10 ng/mL and negative digital rectal examination   findings. Prostatic biopsy is required for the diagnosis of   cancer.   In patients with total PSA concentrations of 4-10 ng/mL, the   probability of finding prostate cancer on needle biopsy by age in   years is:  %fPSA               50-59    60-69    70 or older  0  - 10%            49%      58%      65%  11 - 18%            27%      34%      41%  19 - 25%            18%      24%      30%  Greater than 25%     9%      12%      16%  Other factors may help determine the actual risk of prostate   cancer in individual patients.  Performed By: [email or web address]  [address] [place] [id number]  : Dar Bee MD, PhD     07/14/2022 See Note % Final     Comment:      The free PSA percentage cannot be calculated due to the low PSA   value. Measurement of the free PSA percentage is recommended for   samples with PSA values between 3 and 10 ng/mL.  INTERPRETIVE INFORMATION: Prostate  Specific Antigen, Free                            Percentage  [employer] uses the Roche Free PSA electrochemiluminescent immunoassay   method in conjunction with the Roche PSA electrochemiluminescent   immunoassay method to determine the free PSA percentage. Values   obtained with different assay methods should not be used   interchangeably. The free PSA percentage is an aid in   distinguishing prostate cancer from benign prostatic conditions in   individuals with a prostate age 50 years and older with a total   PSA between 3 and 10 ng/mL and negative digital rectal examination   findings. Prostatic biopsy is required for the diagnosis of   cancer.   In patients with total PSA concentrations of 4-10 ng/mL, the   probability of finding prostate cancer on needle biopsy by age in   years is:  %fPSA               50-59    60-69    70 or older  0  - 10%            49%      58%      65%  11 - 18%            27%      34%      41%  19 - 25%            18%      24%      30%  Greater than 25%     9%      12%      16%  Other factors may help determine the actual risk of prostate   cancer in individual patients.  Performed By: [facility]  [address] [place] [id number]  : Dar Bee MD, PhD     07/08/2020 27 % Final     Comment:     INTERPRETIVE INFORMATION: Prostate Specific Antigen, Free   Percentage  [employer] uses the Roche Free PSA electrochemiluminescent immunoassay   method in conjunction with the Roche PSA electrochemiluminescent   immunoassay method to determine the free PSA percentage. Values   obtained with different assay methods should not be used   interchangeably. The free PSA percentage is an aid in   distinguishing prostate cancer from benign prostatic conditions in   men age 50 and older with a total PSA between 3 and 10 ng/mL and   negative digital rectal examination findings. Prostatic biopsy is   required for the diagnosis of cancer.  In patients with total PSA  concentrations of 4-10 ng/mL, the   probability of finding prostate cancer on needle biopsy by age in   years is:  %fPSA               50-59    60-69    70 or older  0  - 10%            49%      58%      65%  11 - 18%            27%      34%      41%  19 - 25%            18%      24%      30%  Greater than 25%     9%      12%      16%  Other factors may help determine the actual risk of prostate   cancer in individual patients.  Performed By: [facility]  [address] [place] [id number]  : Yoandy Heard MD, MS           Lab Results   Component Value Date    WBC 6.1 01/15/2024    HGB 13.8 01/15/2024    HCT 42.5 01/15/2024     01/15/2024    CHOL 168 01/15/2024    TRIG 254 (H) 01/15/2024    HDL 30.0 01/15/2024    LDLDIRECT 94 05/18/2022    ALT 19 05/18/2022    AST 16 05/18/2022     01/15/2024    K 4.3 01/15/2024     01/15/2024    CREATININE 1.29 01/15/2024    BUN 16 01/15/2024    CO2 25 01/15/2024    TSH 2.94 05/30/2023    PSA 0.14 01/15/2024    PSA 0.2 01/15/2024    INR 1.0 01/15/2024    HGBA1C 6.5 (H) 01/15/2024             ASSESSMENT:  Sid Farmer is a 78 y.o. male with No matching staging information was found for the patient..      The patient continues to recover from radiation side effects and has no evidence of recurrence     PLAN:    The patient will follow-up with radiation oncology as needed  Surveillance PSA and testosterone in 6 months   Follow-up with Dr D'Amico  Pt to discuss with cardiology adding Cialis given he is on PRN nitroglycerin       NCCN Guidelines were applicable to guide this patients treatment plan.  Preethi Tomlinson MD       Future Appointments   Date Time Provider Department Center   4/8/2024 12:40 PM [facility]    4/8/2024  1:30 PM Sarina Hsu, APRN-CNP [id number] [place]   8/7/2024  3:00 PM Slim Jiang MD [id number] [place]

## 2024-02-29 ENCOUNTER — LAB (OUTPATIENT)
Dept: LAB | Facility: LAB | Age: 79
End: 2024-02-29
Payer: MEDICARE

## 2024-02-29 DIAGNOSIS — N40.1 BENIGN PROSTATIC HYPERPLASIA WITH LOWER URINARY TRACT SYMPTOMS: Primary | ICD-10-CM

## 2024-02-29 DIAGNOSIS — Z01.812 ENCOUNTER FOR PREPROCEDURAL LABORATORY EXAMINATION: ICD-10-CM

## 2024-02-29 DIAGNOSIS — C61 MALIGNANT NEOPLASM OF PROSTATE (MULTI): ICD-10-CM

## 2024-02-29 DIAGNOSIS — N13.8 OTHER OBSTRUCTIVE AND REFLUX UROPATHY: ICD-10-CM

## 2024-02-29 LAB
ANION GAP SERPL CALC-SCNC: 13 MMOL/L (ref 10–20)
APPEARANCE UR: CLEAR
BILIRUB UR STRIP.AUTO-MCNC: NEGATIVE MG/DL
BUN SERPL-MCNC: 15 MG/DL (ref 6–23)
CALCIUM SERPL-MCNC: 9.1 MG/DL (ref 8.6–10.3)
CHLORIDE SERPL-SCNC: 104 MMOL/L (ref 98–107)
CO2 SERPL-SCNC: 25 MMOL/L (ref 21–32)
COLOR UR: NORMAL
CREAT SERPL-MCNC: 1.14 MG/DL (ref 0.5–1.3)
EGFRCR SERPLBLD CKD-EPI 2021: 66 ML/MIN/1.73M*2
ERYTHROCYTE [DISTWIDTH] IN BLOOD BY AUTOMATED COUNT: 13.1 % (ref 11.5–14.5)
GLUCOSE SERPL-MCNC: 94 MG/DL (ref 74–99)
GLUCOSE UR STRIP.AUTO-MCNC: NEGATIVE MG/DL
HCT VFR BLD AUTO: 40.6 % (ref 41–52)
HGB BLD-MCNC: 13.5 G/DL (ref 13.5–17.5)
KETONES UR STRIP.AUTO-MCNC: NEGATIVE MG/DL
LEUKOCYTE ESTERASE UR QL STRIP.AUTO: NEGATIVE
MCH RBC QN AUTO: 28.9 PG (ref 26–34)
MCHC RBC AUTO-ENTMCNC: 33.3 G/DL (ref 32–36)
MCV RBC AUTO: 87 FL (ref 80–100)
NITRITE UR QL STRIP.AUTO: NEGATIVE
NRBC BLD-RTO: 0 /100 WBCS (ref 0–0)
PH UR STRIP.AUTO: 6 [PH]
PLATELET # BLD AUTO: 183 X10*3/UL (ref 150–450)
POTASSIUM SERPL-SCNC: 4.1 MMOL/L (ref 3.5–5.3)
PROT UR STRIP.AUTO-MCNC: NEGATIVE MG/DL
RBC # BLD AUTO: 4.67 X10*6/UL (ref 4.5–5.9)
RBC # UR STRIP.AUTO: NEGATIVE /UL
SODIUM SERPL-SCNC: 138 MMOL/L (ref 136–145)
SP GR UR STRIP.AUTO: 1.01
UROBILINOGEN UR STRIP.AUTO-MCNC: <2 MG/DL
WBC # BLD AUTO: 6.6 X10*3/UL (ref 4.4–11.3)

## 2024-02-29 PROCEDURE — 84154 ASSAY OF PSA FREE: CPT

## 2024-02-29 PROCEDURE — 85027 COMPLETE CBC AUTOMATED: CPT

## 2024-02-29 PROCEDURE — 81003 URINALYSIS AUTO W/O SCOPE: CPT

## 2024-02-29 PROCEDURE — 36415 COLL VENOUS BLD VENIPUNCTURE: CPT

## 2024-02-29 PROCEDURE — 84153 ASSAY OF PSA TOTAL: CPT

## 2024-02-29 PROCEDURE — 87086 URINE CULTURE/COLONY COUNT: CPT

## 2024-02-29 PROCEDURE — 80048 BASIC METABOLIC PNL TOTAL CA: CPT

## 2024-03-01 LAB — BACTERIA UR CULT: NO GROWTH

## 2024-03-02 LAB
PSA FREE MFR SERPL: <25 %
PSA FREE SERPL-MCNC: <0.1 NG/ML
PSA SERPL IA-MCNC: 0.4 NG/ML (ref 0–4)

## 2024-03-07 NOTE — H&P
UROLOGY ADMISSION HISTORY PHYSICAL NOTE FOR SHORT STAY ADMISSION ON FRIDAY, 3/8/2024 IS LISTED IMMEDIATELY BELOW    SUBJECTIVE: Pleasant 78-year-old white male currently being admitted Friday, 3/8/2024 for follow-up of a tight urethral stricture that was last dilated on 10/18/2023 requiring a complex over-the-wire dilation for urethral stricture  Currently patient has been experiencing reduced force and flow of his urinary stream and reports some spotty pink-tinged urine a couple of weeks ago.  No other complaints  Additional medical comorbidities and medical findings and examination unchanged from the visits with cardiology and radiation oncology on 2/7/2024 and 2/19/2024 listed below     OBJECTIVE:   On today's visit the patient is alert oriented comfortable appropriately interactive  General examination unchanged from exams below  Current PSA as of 2/29/2024 is low at 0.4 indicating continued remission from prostate cancer  CBC unremarkable with hemoglobin of 13.5 and normal WBC count  Urinalysis and culture are both negative  Basic metabolic panel is completely normal with normal serum creatinine of 1.14 and normal electrolytes    ASSESSMENT/PLAN    Recurrent urethral stricture previously dilated 10/18/2023 and presumed to be probably secondary to radiation effects from treatment for his prostate cancer  Slow urinary flow obstructive element  Verbal history of possible hematuria recently  Additional medical comorbidities as listed and reviewed below  Currently planned to be admitted as a short stay admission Friday, 3/8/2024 for cystoscopy with urethral calibration and dilation, possibly with use of complex over-the-wire progressive dilation under MAC IV sedation  Procedure alternatives complications risks benefits were reviewed and patient is agreeable to proceed as planned    ADDITIONAL MEDICAL BACKGROUND LISTED BELOW    LAST CARDIOLOGY/MEDICAL VISIT AND EXAMINATION HISTORY PHYSICAL IS NOTED IMMEDIATELY BELOW  FOR ADDITIONAL BACKGROUND INFORMATION       Office Visit    2/7/2024  [facility]       Slim Jiang MD  Cardiology CAD S/P percutaneous coronary angioplasty +12 more  Dx Follow-up   Reason for Visit    Progress Notes  Slim Jiang MD (Physician)  Cardiology  Expand All Collapse All  CARDIOLOGY OFFICE VISIT        CHIEF COMPLAINT        HISTORY OF PRESENT ILLNESS  The patient is being seen after his office visit from 1/15/2024.  Please see that note for complete details.  The patient had chest x-ray performed which demonstrated no active disease.  His hemoglobin is 13.8.  His BNP is normal.  His echocardiogram demonstrates normal left ventricular systolic function and no significant valvular abnormalities.  His Lexiscan nuclear stress test demonstrates no evidence of myocardial ischemia.  There was a small fixed defect in the inferior apical area.  I believe this is due to diaphragmatic attenuation.  I doubt this is due to infarction.  His left ventricular ejection fraction is normal.  I explained to him that his tests are satisfactory and he can go ahead with his needed urologic procedure.  I told him to hold his aspirin for 1 week prior to that.  His hemoglobin A1c was elevated at 6.5.  I recommend he see his family physician who is going to see apparently the beginning of March about may be getting on Ozempic for his diabetes and see if that will help him lose some weight.  I think all his symptoms are due to his significant weight gain and lack of physical activities.        IMPRESSION:   1. Morbid obesity  2. Coronary artery disease,   3. Remote multivessel percutaneous coronary intervention.  4. Essential hypertension.  5. Mixed hyperlipidemia.  6. Permanent pacemaker for sinus node dysfunction.  7. Obstructive sleep apnea, being managed with BiPAP.  8. History of gastroesophageal reflux.  9. Hypothyroidism, on replacement therapy.  10. Prostate cancer being treated with  radiation  11. Chronic renal insufficiency, stage III, being seen by nephrology  12. Chronic venous insufficiency of lower extremities with bilateral pretibial edema        Patient's cardiac status is stable at this time.  He is a satisfactory risk from a cardiac standpoint for his upcoming needed urologic procedure.  I told him to hold his aspirin for 1 week prior to the procedure.     Please excuse any errors in grammar or translation related to this dictation. Voice recognition software was utilized to prepare this document.            Past Medical History    Medical History  Past Medical History:  Diagnosis Date   Anemia     Anxiety     Arthritis     Atrial arrhythmia     Atrial fibrillation (CMS/HCC)     CKD (chronic kidney disease)      Stage 2 d/t DM2   Constipation     Coronary artery disease     Depression     Diabetes mellitus (CMS/HCC)      no medication; elevation of HgA1C   Diverticulosis     Dizziness     GERD (gastroesophageal reflux disease)     History of morbid obesity     History of OCD (obsessive compulsive disorder)     Hypertension     Hypothyroidism     Joint pain      lower back   Prostate cancer (CMS/HCC)     Rectal pain     Second degree AV block     Shortness of breath      use of inhaler if needed   Sleep apnea      uses BiPAP   Syncope     Vertigo     Wears glasses            Social History    Social History       Tobacco Use   Smoking status: Former      Types: Cigarettes   Smokeless tobacco: Never  Vaping Use   Vaping Use: Never used  Substance Use Topics   Alcohol use: Yes      Comment: rare, New Years   Drug use: Never        Family History       Family History  Family History  Problem Relation Name Age of Onset   Other (acute myocardial infarction) Mother       Coronary artery disease Mother       Diabetes Mother       Other (ptca) Mother       Coronary artery disease Father       Other (cva) Sister              Allergies:    Allergies  Allergen Reactions   Barium Iodide Rash   Pine  Tar Itching and Rash        Outpatient Medications:    Current Outpatient Medications  Medication Instructions   albuterol 90 mcg/actuation inhaler INHALE 2 (TWO) PUFFSQ FOUR - 6 (SIX) HOURS AS NEEDED   amLODIPine (Norvasc) 5 mg tablet 1 tablet, oral, Daily   aspirin 81 mg chewable tablet 1 tablet, oral, Daily   cholecalciferol (Vitamin D-3) 125 MCG (5000 UT) capsule Take 1 capsule (125 mcg) by mouth once daily.   cyanocobalamin, vitamin B-12, 2,500 mcg tablet,chewable Chew 2,500 mcg 1 time.   levothyroxine (Synthroid, Levoxyl) 50 mcg tablet 1 tablet, oral, Daily   levothyroxine (Tirosint) 25 mcg capsule Take 1 tablet by mouth on Monday, Wednesday, ad Friday. Take with 50 mcg tablet   losartan (COZAAR) 50 mg, oral, 2 times daily   metoprolol tartrate (LOPRESSOR) 50 mg, oral, 2 times daily   nitroglycerin (Nitrostat) 0.4 mg SL tablet PLACE 1 TABLET UNDER THE TONGUE EVERY 5 MINUTES FOR UP TO 3 DOSES AS NEEDED FOR CHEST PAIN.CALL 911 IF PAIN PERSISTS.   phenazopyridine (URINARY PAIN RELIEF) 95 mg, oral, As needed   pravastatin (PRAVACHOL) 40 mg, oral, Daily   tamsulosin (Flomax) 0.4 mg 24 hr capsule 1 capsule, oral, Daily   ubidecarenone (ULTRA COQ10 ORAL) 100 mg, oral, Once           REVIEW OF SYSTEMS  Review of Systems   All other systems reviewed and are negative.           VITALS    Vitals:    02/07/24 1526  BP: 142/74  Pulse: 60        PHYSICAL EXAM  Constitutional:       Appearance: Healthy appearance. Not in distress.   Eyes:      Conjunctiva/sclera: Conjunctivae normal.      Pupils: Pupils are equal, round, and reactive to light.   Neck:      Vascular: No JVR. JVD normal.   Pulmonary:      Effort: Pulmonary effort is normal.      Breath sounds: Normal breath sounds. No wheezing. No rhonchi. No rales.   Chest:      Chest wall: Not tender to palpatation.   Cardiovascular:      PMI at left midclavicular line. Normal rate. Regular rhythm. Normal S1. Normal S2.       Murmurs: There is no murmur.      No gallop.  No  click. No rub.   Pulses:     Intact distal pulses.   Edema:     Peripheral edema absent.   Abdominal:      Tenderness: There is no abdominal tenderness.   Musculoskeletal: Normal range of motion.         General: No tenderness.      Cervical back: Normal range of motion. Skin:     General: Skin is warm and dry.   Neurological:      General: No focal deficit present.      Mental Status: Alert and oriented to person, place and time.              ASSESSMENT AND PLAN  Diagnoses and all orders for this visit:  CAD S/P percutaneous coronary angioplasty  Benign essential hypertension  Mixed hyperlipidemia  Cardiac pacemaker in situ  SSS (sick sinus syndrome) (CMS/[facility])  Obstructive sleep apnea syndrome  History of gastroesophageal reflux (GERD)  Malignant neoplasm of prostate (CMS/[facility])  Stage 3 chronic kidney disease, unspecified whether stage 3a or 3b CKD (CMS/[facility])  Chronic venous insufficiency  Dyspnea, unspecified type  BMI 45.0-49.9, adult (CMS/[facility])  Former cigarette smoker        [unfilled]    Electronically signed by Slim Jiang MD at 2/7/2024  4:05 PM    Instructions    Patient is an acceptable cardiac risk for prostate surgery.  Patient can hold ASA 1 week prior and resume following.     Follow up office visit in 6 months.     Continue same medications/treatment.  Patient educated on proper medication use.  Please bring all medicines, vitamins and herbal supplements with you when you come to the office.     I, Marcy Alvarenga LPN, am scribing for and in the presence of Dr. Slim Jiang MD, [facility]            AVS - Outpatient (Printed 2/7/2024)  Additional Documentation    Vitals: /74 (BP Location: Left arm, Patient Position: Sitting)     Pulse 60     Wt 124 kg (273 lb)     BMI 46.86 kg/m²     BSA 2.37 m²  Flowsheets: Interfaced Flowsheet Data  Encounter Info: Billing Info,     History,     Allergies,     Developmental    No questionnaires available.               Orders  Placed    None  Medication Changes      None  Medication List  Visit Diagnoses      CAD S/P percutaneous coronary angioplasty    Benign essential hypertension    Mixed hyperlipidemia    Cardiac pacemaker in situ    SSS (sick sinus syndrome) (CMS/[facility])    Obstructive sleep apnea syndrome    History of gastroesophageal reflux (GERD)    Malignant neoplasm of prostate (CMS/[facility])    Stage 3 chronic kidney disease, unspecified whether stage 3a or 3b CKD (CMS/[facility])    Chronic venous insufficiency    Dyspnea, unspecified type    BMI 45.0-49.9, adult (CMS/[facility])    Former cigarette smoker    ______________________________________________________________________________________________________________________________________________________________________________  LAST RADIATION ONCOLOGY NOTE IS LISTED BELOW FOR COMPLETE BACKGROUND ON PROSTATE CANCER AND APPROPRIATE TREATMENT FOLLOW-UP     Radiation Oncology Established Patient    2024  [facility]       Preethi Tomlinson MD  Radiation Oncology Malignant neoplasm of prostate (CMS/HCC) +1 more  Dx Follow-up  Reason for Visit    Progress Notes  Preethi Tomlinson MD (Physician)  Radiation Oncology     Radiation Oncology Follow-Up     Patient Name:  Sid Farmer  MRN:  [id number]  :  1945     Referring Provider: No ref. provider found  Primary Care Provider: Joanne Thornton MD  Care Team: Patient Care Team:  Joanne Thornton MD as PCP - General (Internal Medicine)  Slim Jiang MD as Cardiologist (Cardiology)  Louis D'Amico, MD as Surgeon (Urology)     Date of Service: 2024      SUMMARY: High risk prostate cancer, PSA 7.9, Alissa 4+4=8 without PNI, involving 8/13 cores. Most of the positive specimens came from MRI directed fusion biopsies. Clinical stage [id number]. 41cc gland by ultrasound volume study. Axumin PET/CT negative for metastases.      I-125 seed implant boost, 115 Camara, implanted on 2021 with placement of SpaceOAR gel after  the procedure. He then received external beam radiotherapy, 25 fractions at 1.8 Gray each, treating the pelvic lymph nodes electively, 45Gy total. External beam RT completed on 10/13/2021.      SUBJECTIVE  History of Present Illness:   Sid Farmer is a 78 y.o. male who is presenting today for follow-up. Prior radiation treatment as follows:       Radiation Treatments          No radiation treatments to show. (Treatments may have been administered in another system.)                INTERIM HISTORY: Pt reports having some issues with hematuria which is being addressed by D'Amico. He has a cystoscopy coming up      Most recent PSA        PSA  Date Value Ref Range Status  01/15/2024 0.2 0.0 - 4.0 ng/mL Final      Comment:      INTERPRETIVE INFORMATION: Prostate Specific Antigen     The Roche PSA electrochemiluminescent immunoassay is used. Results   obtained with different test methods or kits cannot be used   interchangeably. The Roche PSA method is approved for use as an   aid in the detection of prostate cancer when used in conjunction   with a digital rectal exam in individuals with a prostate age 50   years and older. The Roche PSA is also indicated for the serial   measurement of PSA to aid in the prognosis and management of   prostate cancer patients. Elevated PSA concentrations can only   suggest the presence of prostate cancer until biopsy is performed.   PSA concentrations can also be elevated in benign prostatic   hyperplasia or inflammatory conditions of the prostate. PSA is   generally not elevated in healthy individuals or individuals with   nonprostatic carcinoma.       Prostate Specific AG  Date Value Ref Range Status  01/15/2024 0.14 <=4.00 ng/mL Final       PSA, Free  Date Value Ref Range Status  01/15/2024 <0.1 ng/mL Final       PSA, Free Pct  Date Value Ref Range Status  01/15/2024 <50 % Final      Comment:      INTERPRETIVE INFORMATION: Prostate Specific Antigen, Free                             Percentage     [employer] uses the Roche Free PSA electrochemiluminescent immunoassay   method in conjunction with the Roche PSA electrochemiluminescent   immunoassay method to determine the free PSA percentage. Values   obtained with different assay methods should not be used   interchangeably. The free PSA percentage is an aid in   distinguishing prostate cancer from benign prostatic conditions in   individuals with a prostate age 50 years and older with a total   PSA between 3 and 10 ng/mL and negative digital rectal examination   findings. Prostatic biopsy is required for the diagnosis of   cancer.      In patients with total PSA concentrations of 4-10 ng/mL, the   probability of finding prostate cancer on needle biopsy by age in   years is:     %fPSA               50-59    60-69    70 or older  0  - 10%            49%      58%      65%  11 - 18%            27%      34%      41%  19 - 25%            18%      24%      30%  Greater than 25%     9%      12%      16%     Other factors may help determine the actual risk of prostate   cancer in individual patients.  Performed By: [facility]  [address] [place] [address]  : Dar Bee MD, PhD  CLIA Number: [id number]        SUBJECTIVE:     Today, the patient reports feeling well overall. He denies changes in his symptoms.  His sexual Health Inventory for Men score (MELISSA) is 1. His International Prostate Symptom Score (IPSS) score is 15. He denies diarrhea or constipation. . He denies bone pain.     Review of Systems:   Review of Systems   Constitutional:  Positive for fatigue. Negative for appetite change, chills, diaphoresis, fever and unexpected weight change.   HENT:  Negative.     Eyes:  Positive for eye problems. Negative for icterus.        Worsening vision   Respiratory:  Positive for chest tightness and shortness of breath. Negative for cough, hemoptysis and wheezing.         Recent stress test. Has Pacemaker    Cardiovascular:  Positive for leg swelling. Negative for chest pain and palpitations.   Gastrointestinal:  Positive for abdominal pain and constipation. Negative for diarrhea, nausea, rectal pain and vomiting.        Sharp abdominal pain occasionally   Genitourinary:  Positive for difficulty urinating, frequency and nocturia. Negative for bladder incontinence, dyspareunia, dysuria, hematuria, pelvic pain and penile discharge.         Sees Dr. D'amico in urology. Scheduled for cystoscopy 3/8   Musculoskeletal:  Positive for arthralgias, back pain, gait problem and neck pain. Negative for flank pain, myalgias and neck stiffness.   Skin: Negative.    Neurological:  Positive for dizziness, extremity weakness, gait problem, light-headedness and numbness. Negative for headaches, seizures and speech difficulty.   Hematological: Negative.    Psychiatric/Behavioral:  Positive for confusion and decreased concentration. Negative for depression, sleep disturbance and suicidal ideas. The patient is nervous/anxious.         States OCD diagnosis.      The patient's current pain level was assessed.  They report currently having a pain of 4 out of 10.  They feel their pain is not under control without the use of pain medications.     Performance Status:   The Karnofsky performance scale today is 80, Normal activity with effort; some signs or symptoms of disease (ECOG equivalent 1).          OBJECTIVE  Vital Signs:  /84 (BP Location: Right arm, Patient Position: Sitting, BP Cuff Size: Large adult)   Pulse 72   Temp 36.1 °C (97 °F) (Temporal)   Resp 18   Wt 122 kg (269 lb 12.8 oz)   SpO2 97%   BMI 46.31 kg/m²    Physical Exam:  Constitutional: Awake, alert and oriented. No acute distress. Appears stated age.  Eyes: Conjunctivae are clear without exudates or hemorrhage. Eyelids are normal in appearance without swelling or lesions.  ENMT: mucous membranes moist, no apparent injury, no lesions seen  Neck: Neck supple, no  apparent injury, trachea midline  Resp/Thorax: Patent airways,  good chest expansion. Thorax symmetric  Cardiovascular: The external chest is normal without lifts, heaves, or thrills. PMI is not visible.  GI: Nondistended, soft, non-tender.  /Gyn: Deferred  MSK: No tenderness noted on palpation of the spine. No ROM limitations.  Extremities: normal extremities, no cyanosis, erythema or edema.  Neurological: alert and oriented x3. Sensory and motor function appear intact. No gait abnormality observed.  Psych: Appropriate mood and behavior.  Skin: Warm and dry, no new lesions or rashes.             PSA  Date Value Ref Range Status  01/15/2024 0.2 0.0 - 4.0 ng/mL Final      Comment:      INTERPRETIVE INFORMATION: Prostate Specific Antigen     The Roche PSA electrochemiluminescent immunoassay is used. Results   obtained with different test methods or kits cannot be used   interchangeably. The Roche PSA method is approved for use as an   aid in the detection of prostate cancer when used in conjunction   with a digital rectal exam in individuals with a prostate age 50   years and older. The Roche PSA is also indicated for the serial   measurement of PSA to aid in the prognosis and management of   prostate cancer patients. Elevated PSA concentrations can only   suggest the presence of prostate cancer until biopsy is performed.   PSA concentrations can also be elevated in benign prostatic   hyperplasia or inflammatory conditions of the prostate. PSA is   generally not elevated in healthy individuals or individuals with   nonprostatic carcinoma.  09/19/2023 0.08 0.00 - 4.00 ng/mL Final      Comment:      The FDA requires that the method used for PSA assay be   reported to the physician. Values obtained with different   assay methods must not be used interchangeably. This test  was performed at [facility] using the Access   [employer] PSA assay is a two-site immunoenzymatic sandwich   assay. The assay is  approved for measurement of   prostate-specific antigen (PSA)in serum and may be used   in conjunction with a digital rectal examination in men   50 years and older as an aid in detection of prostate   cancer.  5-Alpha-reductase inhibitors (e.g. Proscar, Finasteride,   Avodart, Dutasteride and Amelie) for the treatment of BPH   have been shown to lower PSA levels by an average of 50%   after 6 months of treatment.     01/09/2023 <0.1 0.0 - 4.0 ng/mL Final      Comment:      INTERPRETIVE INFORMATION: Prostate Specific Antigen  The Roche PSA electrochemiluminescent immunoassay is used. Results   obtained with different test methods or kits cannot be used   interchangeably. The Roche PSA method is approved for use as an   aid in the detection of prostate cancer when used in conjunction   with a digital rectal exam in individuals with a prostate age 50   years and older. The Roche PSA is also indicated for the serial   measurement of PSA to aid in the prognosis and management of   prostate cancer patients. Elevated PSA concentrations can only   suggest the presence of prostate cancer until biopsy is performed.   PSA concentrations can also be elevated in benign prostatic   hyperplasia or inflammatory conditions of the prostate. PSA is   generally not elevated in healthy individuals or individuals with   nonprostatic carcinoma.     07/14/2022 <0.1 0.0 - 4.0 ng/mL Final      Comment:      INTERPRETIVE INFORMATION: Prostate Specific Antigen  The Roche PSA electrochemiluminescent immunoassay is used. Results   obtained with different test methods or kits cannot be used   interchangeably. The Roche PSA method is approved for use as an   aid in the detection of prostate cancer when used in conjunction   with a digital rectal exam in individuals with a prostate age 50   years and older. The Roche PSA is also indicated for the serial   measurement of PSA to aid in the prognosis and management of   prostate cancer patients.  Elevated PSA concentrations can only   suggest the presence of prostate cancer until biopsy is performed.   PSA concentrations can also be elevated in benign prostatic   hyperplasia or inflammatory conditions of the prostate. PSA is   generally not elevated in healthy individuals or individuals with   nonprostatic carcinoma.     12/22/2021 <0.10 0.00 - 4.00 ng/mL Final      Comment:      The FDA requires that the method used for PSA assay be   reported to the physician. Values obtained with different   assay methods must not be used interchangeably. This test  was performed at [facility] using the Access   [employer] PSA assay is a two-site immunoenzymatic sandwich   assay. The assay is approved for measurement of   prostate-specific antigen (PSA)in serum and may be used   in conjunction with a digital rectal examination in men   50 years and older as an aid in detection of prostate   cancer.  5-Alpha-reductase inhibitors (e.g. Proscar, Finasteride,   Avodart, Dutasteride and Amelie) for the treatment of BPH   have been shown to lower PSA levels by an average of 50%   after 6 months of treatment.     06/16/2021 0.30 0.00 - 4.00 ng/mL Final      Comment:      The FDA requires that the method used for PSA assay be   reported to the physician. Values obtained with different   assay methods must not be used interchangeably. This test  was performed at [facility] using the Access   Hybritech PSA assay is a two-site immunoenzymatic sandwich   assay. The assay is approved for measurement of   prostate-specific antigen (PSA)in serum and may be used   in conjunction with a digital rectal examination in men   50 years and older as an aid in detection of prostate   cancer.  5-Alpha-reductase inhibitors (e.g. Proscar, Finasteride,   Avodart, Dutasteride and Amelie) for the treatment of BPH   have been shown to lower PSA levels by an average of 50%   after 6 months of treatment.     07/08/2020 7.9  (H) 0.0 - 4.0 ng/mL Final      Comment:      INTERPRETIVE INFORMATION: Prostate Specific Antigen  The Roche PSA electrochemiluminescent immunoassay was used.   Results obtained with different test methods or kits cannot be   used interchangeably. The Roche PSA method is approved for use as   an aid in the detection of prostate cancer when used in   conjunction with a digital rectal exam in men age 50 and older.   The Roche PSA method is also indicated for the serial measurement   of PSA to aid in the prognosis and management of prostate cancer   patients. Elevated PSA concentrations can only suggest the   presence of prostate cancer until biopsy is performed. PSA   concentrations can also be elevated in benign prostatic   hyperplasia or inflammatory conditions of the prostate. PSA is   generally not elevated in healthy men or men with non-prostatic   carcinoma.     01/07/2020 4.20 (H) 0.00 - 4.00 ng/mL Final      Comment:      The FDA requires that the method used for PSA assay be   reported to the physician. Values obtained with different   assay methods must not be used interchangeably. This test  was performed at [facility] using the Access   Hybritech PSA assay is a two-site immunoenzymatic sandwich   assay. The assay is approved for measurement of   prostate-specific antigen (PSA)in serum and may be used   in conjunction with a digital rectal examination in men   50 years and older as an aid in detection of prostate   cancer.  5-Alpha-reductase inhibitors (e.g. Proscar, Finasteride,   Avodart, Dutasteride and Amelie) for the treatment of BPH   have been shown to lower PSA levels by an average of 50%   after 6 months of treatment.          Prostate Specific AG  Date Value Ref Range Status  01/15/2024 0.14 <=4.00 ng/mL Final       PSA, Free  Date Value Ref Range Status  01/15/2024 <0.1 ng/mL Final  01/09/2023 <0.1 ng/mL Final  07/14/2022 <0.1 ng/mL Final  07/08/2020 2.1 ng/mL Final       PSA, Free  Pct  Date Value Ref Range Status  01/15/2024 <50 % Final      Comment:      INTERPRETIVE INFORMATION: Prostate Specific Antigen, Free                            Percentage     [employer] uses the Roche Free PSA electrochemiluminescent immunoassay   method in conjunction with the Roche PSA electrochemiluminescent   immunoassay method to determine the free PSA percentage. Values   obtained with different assay methods should not be used   interchangeably. The free PSA percentage is an aid in   distinguishing prostate cancer from benign prostatic conditions in   individuals with a prostate age 50 years and older with a total   PSA between 3 and 10 ng/mL and negative digital rectal examination   findings. Prostatic biopsy is required for the diagnosis of   cancer.      In patients with total PSA concentrations of 4-10 ng/mL, the   probability of finding prostate cancer on needle biopsy by age in   years is:     %fPSA               50-59    60-69    70 or older  0  - 10%            49%      58%      65%  11 - 18%            27%      34%      41%  19 - 25%            18%      24%      30%  Greater than 25%     9%      12%      16%     Other factors may help determine the actual risk of prostate   cancer in individual patients.  Performed By: [facility]  [address] [place] [id number]  : Dar Bee MD, PhD  CLIA Number: [id number]  01/09/2023 See Note % Final      Comment:       The free PSA percentage cannot be calculated due to the low PSA   value. Measurement of the free PSA percentage is recommended for   samples with PSA values between 3 and 10 ng/mL.  INTERPRETIVE INFORMATION: Prostate Specific Antigen, Free                            Percentage  [employer] uses the Roche Free PSA electrochemiluminescent immunoassay   method in conjunction with the Roche PSA electrochemiluminescent   immunoassay method to determine the free PSA percentage. Values   obtained with different assay  methods should not be used   interchangeably. The free PSA percentage is an aid in   distinguishing prostate cancer from benign prostatic conditions in   individuals with a prostate age 50 years and older with a total   PSA between 3 and 10 ng/mL and negative digital rectal examination   findings. Prostatic biopsy is required for the diagnosis of   cancer.   In patients with total PSA concentrations of 4-10 ng/mL, the   probability of finding prostate cancer on needle biopsy by age in   years is:  %fPSA               50-59    60-69    70 or older  0  - 10%            49%      58%      65%  11 - 18%            27%      34%      41%  19 - 25%            18%      24%      30%  Greater than 25%     9%      12%      16%  Other factors may help determine the actual risk of prostate   cancer in individual patients.  Performed By: [facility]  [address] [place] [address]  : Dar Bee MD, PhD     07/14/2022 See Note % Final      Comment:       The free PSA percentage cannot be calculated due to the low PSA   value. Measurement of the free PSA percentage is recommended for   samples with PSA values between 3 and 10 ng/mL.  INTERPRETIVE INFORMATION: Prostate Specific Antigen, Free                            Percentage  [employer] uses the Roche Free PSA electrochemiluminescent immunoassay   method in conjunction with the Roche PSA electrochemiluminescent   immunoassay method to determine the free PSA percentage. Values   obtained with different assay methods should not be used   interchangeably. The free PSA percentage is an aid in   distinguishing prostate cancer from benign prostatic conditions in   individuals with a prostate age 50 years and older with a total   PSA between 3 and 10 ng/mL and negative digital rectal examination   findings. Prostatic biopsy is required for the diagnosis of   cancer.   In patients with total PSA concentrations of 4-10 ng/mL, the   probability of  finding prostate cancer on needle biopsy by age in   years is:  %fPSA               50-59    60-69    70 or older  0  - 10%            49%      58%      65%  11 - 18%            27%      34%      41%  19 - 25%            18%      24%      30%  Greater than 25%     9%      12%      16%  Other factors may help determine the actual risk of prostate   cancer in individual patients.  Performed By: [facility]  [address] [place] [id number]  : Dar Bee MD, PhD     07/08/2020 27 % Final      Comment:      INTERPRETIVE INFORMATION: Prostate Specific Antigen, Free   Percentage  [employer] uses the Roche Free PSA electrochemiluminescent immunoassay   method in conjunction with the Roche PSA electrochemiluminescent   immunoassay method to determine the free PSA percentage. Values   obtained with different assay methods should not be used   interchangeably. The free PSA percentage is an aid in   distinguishing prostate cancer from benign prostatic conditions in   men age 50 and older with a total PSA between 3 and 10 ng/mL and   negative digital rectal examination findings. Prostatic biopsy is   required for the diagnosis of cancer.  In patients with total PSA concentrations of 4-10 ng/mL, the   probability of finding prostate cancer on needle biopsy by age in   years is:  %fPSA               50-59    60-69    70 or older  0  - 10%            49%      58%      65%  11 - 18%            27%      34%      41%  19 - 25%            18%      24%      30%  Greater than 25%     9%      12%      16%  Other factors may help determine the actual risk of prostate   cancer in individual patients.  Performed By: [facility]  [address] [place] [id number]  : Yoandy Heard MD, MS                Lab Results  Component Value Date    WBC 6.1 01/15/2024    HGB 13.8 01/15/2024    HCT 42.5 01/15/2024     01/15/2024    CHOL 168 01/15/2024    TRIG 254  (H) 01/15/2024    HDL 30.0 01/15/2024    LDLDIRECT 94 05/18/2022    ALT 19 05/18/2022    AST 16 05/18/2022     01/15/2024    K 4.3 01/15/2024     01/15/2024    CREATININE 1.29 01/15/2024    BUN 16 01/15/2024    CO2 25 01/15/2024    TSH 2.94 05/30/2023    PSA 0.14 01/15/2024    PSA 0.2 01/15/2024    INR 1.0 01/15/2024    HGBA1C 6.5 (H) 01/15/2024                 ASSESSMENT:  Sid Farmer is a 78 y.o. male with No matching staging information was found for the patient..       The patient continues to recover from radiation side effects and has no evidence of recurrence      PLAN:    · The patient will follow-up with radiation oncology as needed  · Surveillance PSA and testosterone in 6 months   · Follow-up with Dr D'Amico  · Pt to discuss with cardiology adding Cialis given he is on PRN nitroglycerin         NCCN Guidelines were applicable to guide this patients treatment plan.  Preethi Tomlinson MD       Future Appointments  Date Time Provider Department Center  4/8/2024 12:40 PM [facility] [address]  4/8/2024  1:30 PM RAMÓN Butcher-CNP [id number] [place]  8/7/2024  3:00 PM Slim Jiang MD [id number] [place]            Additional Documentation    Vitals: /84 (BP Location: Right arm, Patient Position: Sitting, BP Cuff Size: Large adult)     Pulse 72     Temp 36.1 °C (97 °F) (Temporal)     Resp 18     Wt 122 kg (269 lb 12.8 oz)     SpO2 97%     BMI 46.31 kg/m²     BSA 2.35 m²     Pain Sc 0-No pain  Flowsheets: Vital Signs,     Vitals Reassessment,     Fall Risk Assessment,     Interfaced Flowsheet Data,     LACE+ Score  Encounter Info: Billing Info,     History,     Allergies,     Procedure Documentation    Communications    View All Conversations on this Encounter  Media  From this encounter  Consent-General - Electronic signature on 2/19/2024 1:29 PM - E-signed    Orders Placed    PSA, Total and Free  Testosterone  Medication  Changes      None  Medication List  Visit Diagnoses      Malignant neoplasm of prostate (CMS/HCC)    Incomplete bladder emptying  Problem List

## 2024-03-07 NOTE — PREPROCEDURE INSTRUCTIONS
PATIENT AWARE TO TAKE METOPROLOL ON DAY OF PROCEDURE.  ASPIRIN ON HOLD DIRECTED BY PHYSICIAN                       NPO Instructions:  Do not eat any food after midnight the night before your surgery/procedure.    Additional Instructions:

## 2024-03-08 ENCOUNTER — ANESTHESIA (OUTPATIENT)
Dept: OPERATING ROOM | Facility: HOSPITAL | Age: 79
End: 2024-03-08
Payer: MEDICARE

## 2024-03-08 ENCOUNTER — ANESTHESIA EVENT (OUTPATIENT)
Dept: OPERATING ROOM | Facility: HOSPITAL | Age: 79
End: 2024-03-08
Payer: MEDICARE

## 2024-03-08 ENCOUNTER — HOSPITAL ENCOUNTER (OUTPATIENT)
Facility: HOSPITAL | Age: 79
Setting detail: OUTPATIENT SURGERY
Discharge: HOME | End: 2024-03-08
Attending: UROLOGY | Admitting: UROLOGY
Payer: MEDICARE

## 2024-03-08 VITALS
HEART RATE: 60 BPM | DIASTOLIC BLOOD PRESSURE: 57 MMHG | BODY MASS INDEX: 44.86 KG/M2 | SYSTOLIC BLOOD PRESSURE: 139 MMHG | RESPIRATION RATE: 18 BRPM | WEIGHT: 262.79 LBS | OXYGEN SATURATION: 99 % | HEIGHT: 64 IN | TEMPERATURE: 97.3 F

## 2024-03-08 DIAGNOSIS — C61 MALIGNANT NEOPLASM OF PROSTATE (MULTI): ICD-10-CM

## 2024-03-08 DIAGNOSIS — N35.911 STRICTURE OF URETHRAL MEATUS IN MALE, UNSPECIFIED STRICTURE TYPE: ICD-10-CM

## 2024-03-08 DIAGNOSIS — R31.0 GROSS HEMATURIA: ICD-10-CM

## 2024-03-08 LAB — GLUCOSE BLD MANUAL STRIP-MCNC: 114 MG/DL (ref 74–99)

## 2024-03-08 PROCEDURE — 82947 ASSAY GLUCOSE BLOOD QUANT: CPT

## 2024-03-08 PROCEDURE — 3700000001 HC GENERAL ANESTHESIA TIME - INITIAL BASE CHARGE: Performed by: UROLOGY

## 2024-03-08 PROCEDURE — 2500000004 HC RX 250 GENERAL PHARMACY W/ HCPCS (ALT 636 FOR OP/ED): Performed by: UROLOGY

## 2024-03-08 PROCEDURE — 2500000005 HC RX 250 GENERAL PHARMACY W/O HCPCS: Performed by: UROLOGY

## 2024-03-08 PROCEDURE — 3600000003 HC OR TIME - INITIAL BASE CHARGE - PROCEDURE LEVEL THREE: Performed by: UROLOGY

## 2024-03-08 PROCEDURE — 7100000002 HC RECOVERY ROOM TIME - EACH INCREMENTAL 1 MINUTE: Performed by: UROLOGY

## 2024-03-08 PROCEDURE — 7100000009 HC PHASE TWO TIME - INITIAL BASE CHARGE: Performed by: UROLOGY

## 2024-03-08 PROCEDURE — 7100000010 HC PHASE TWO TIME - EACH INCREMENTAL 1 MINUTE: Performed by: UROLOGY

## 2024-03-08 PROCEDURE — 3600000008 HC OR TIME - EACH INCREMENTAL 1 MINUTE - PROCEDURE LEVEL THREE: Performed by: UROLOGY

## 2024-03-08 PROCEDURE — 87086 URINE CULTURE/COLONY COUNT: CPT | Mod: ELYLAB | Performed by: UROLOGY

## 2024-03-08 PROCEDURE — 2500000004 HC RX 250 GENERAL PHARMACY W/ HCPCS (ALT 636 FOR OP/ED): Performed by: ANESTHESIOLOGY

## 2024-03-08 PROCEDURE — 7100000001 HC RECOVERY ROOM TIME - INITIAL BASE CHARGE: Performed by: UROLOGY

## 2024-03-08 PROCEDURE — 3700000002 HC GENERAL ANESTHESIA TIME - EACH INCREMENTAL 1 MINUTE: Performed by: UROLOGY

## 2024-03-08 RX ORDER — PROPOFOL 10 MG/ML
INJECTION, EMULSION INTRAVENOUS CONTINUOUS PRN
Status: DISCONTINUED | OUTPATIENT
Start: 2024-03-08 | End: 2024-03-08

## 2024-03-08 RX ORDER — SODIUM CHLORIDE, SODIUM LACTATE, POTASSIUM CHLORIDE, CALCIUM CHLORIDE 600; 310; 30; 20 MG/100ML; MG/100ML; MG/100ML; MG/100ML
100 INJECTION, SOLUTION INTRAVENOUS CONTINUOUS
Status: DISCONTINUED | OUTPATIENT
Start: 2024-03-08 | End: 2024-03-08 | Stop reason: HOSPADM

## 2024-03-08 RX ORDER — LABETALOL HYDROCHLORIDE 5 MG/ML
5 INJECTION, SOLUTION INTRAVENOUS ONCE AS NEEDED
Status: DISCONTINUED | OUTPATIENT
Start: 2024-03-08 | End: 2024-03-08 | Stop reason: HOSPADM

## 2024-03-08 RX ORDER — OXYCODONE HYDROCHLORIDE 5 MG/1
5 TABLET ORAL EVERY 4 HOURS PRN
Status: DISCONTINUED | OUTPATIENT
Start: 2024-03-08 | End: 2024-03-08 | Stop reason: HOSPADM

## 2024-03-08 RX ORDER — LIDOCAINE HYDROCHLORIDE 20 MG/ML
JELLY TOPICAL AS NEEDED
Status: DISCONTINUED | OUTPATIENT
Start: 2024-03-08 | End: 2024-03-08 | Stop reason: HOSPADM

## 2024-03-08 RX ORDER — ONDANSETRON HYDROCHLORIDE 2 MG/ML
4 INJECTION, SOLUTION INTRAVENOUS ONCE AS NEEDED
Status: DISCONTINUED | OUTPATIENT
Start: 2024-03-08 | End: 2024-03-08 | Stop reason: HOSPADM

## 2024-03-08 RX ORDER — FENTANYL CITRATE 50 UG/ML
25 INJECTION, SOLUTION INTRAMUSCULAR; INTRAVENOUS EVERY 5 MIN PRN
Status: DISCONTINUED | OUTPATIENT
Start: 2024-03-08 | End: 2024-03-08 | Stop reason: HOSPADM

## 2024-03-08 RX ORDER — PROPOFOL 10 MG/ML
INJECTION, EMULSION INTRAVENOUS AS NEEDED
Status: DISCONTINUED | OUTPATIENT
Start: 2024-03-08 | End: 2024-03-08

## 2024-03-08 RX ORDER — MIDAZOLAM HYDROCHLORIDE 1 MG/ML
INJECTION, SOLUTION INTRAMUSCULAR; INTRAVENOUS AS NEEDED
Status: DISCONTINUED | OUTPATIENT
Start: 2024-03-08 | End: 2024-03-08

## 2024-03-08 RX ORDER — CIPROFLOXACIN 2 MG/ML
400 INJECTION, SOLUTION INTRAVENOUS ONCE
Status: COMPLETED | OUTPATIENT
Start: 2024-03-08 | End: 2024-03-08

## 2024-03-08 RX ORDER — FENTANYL CITRATE 50 UG/ML
INJECTION, SOLUTION INTRAMUSCULAR; INTRAVENOUS AS NEEDED
Status: DISCONTINUED | OUTPATIENT
Start: 2024-03-08 | End: 2024-03-08

## 2024-03-08 RX ADMIN — FENTANYL CITRATE 50 MCG: 50 INJECTION, SOLUTION INTRAMUSCULAR; INTRAVENOUS at 13:19

## 2024-03-08 RX ADMIN — CIPROFLOXACIN: 400 INJECTION, SOLUTION INTRAVENOUS at 13:16

## 2024-03-08 RX ADMIN — FENTANYL CITRATE 50 MCG: 50 INJECTION, SOLUTION INTRAMUSCULAR; INTRAVENOUS at 13:30

## 2024-03-08 RX ADMIN — PROPOFOL 20 MG: 10 INJECTION, EMULSION INTRAVENOUS at 13:19

## 2024-03-08 RX ADMIN — SODIUM CHLORIDE, POTASSIUM CHLORIDE, SODIUM LACTATE AND CALCIUM CHLORIDE 100 ML/HR: 600; 310; 30; 20 INJECTION, SOLUTION INTRAVENOUS at 11:09

## 2024-03-08 RX ADMIN — PROPOFOL 50 MCG/KG/MIN: 10 INJECTION, EMULSION INTRAVENOUS at 13:14

## 2024-03-08 RX ADMIN — MIDAZOLAM 2 MG: 1 INJECTION INTRAMUSCULAR; INTRAVENOUS at 13:14

## 2024-03-08 RX ADMIN — SODIUM CHLORIDE, POTASSIUM CHLORIDE, SODIUM LACTATE AND CALCIUM CHLORIDE: 600; 310; 30; 20 INJECTION, SOLUTION INTRAVENOUS at 12:17

## 2024-03-08 RX ADMIN — PROPOFOL 60 MG: 10 INJECTION, EMULSION INTRAVENOUS at 13:14

## 2024-03-08 SDOH — HEALTH STABILITY: MENTAL HEALTH: CURRENT SMOKER: 0

## 2024-03-08 ASSESSMENT — PAIN SCALES - GENERAL
PAINLEVEL_OUTOF10: 0 - NO PAIN

## 2024-03-08 ASSESSMENT — COLUMBIA-SUICIDE SEVERITY RATING SCALE - C-SSRS
1. IN THE PAST MONTH, HAVE YOU WISHED YOU WERE DEAD OR WISHED YOU COULD GO TO SLEEP AND NOT WAKE UP?: NO
2. HAVE YOU ACTUALLY HAD ANY THOUGHTS OF KILLING YOURSELF?: NO

## 2024-03-08 ASSESSMENT — PAIN - FUNCTIONAL ASSESSMENT
PAIN_FUNCTIONAL_ASSESSMENT: 0-10
PAIN_FUNCTIONAL_ASSESSMENT: UNABLE TO SELF-REPORT
PAIN_FUNCTIONAL_ASSESSMENT: UNABLE TO SELF-REPORT
PAIN_FUNCTIONAL_ASSESSMENT: 0-10
PAIN_FUNCTIONAL_ASSESSMENT: 0-10
PAIN_FUNCTIONAL_ASSESSMENT: UNABLE TO SELF-REPORT
PAIN_FUNCTIONAL_ASSESSMENT: 0-10

## 2024-03-08 NOTE — NURSING NOTE
PT STATES HAS HAS NOT HAD FEVER, HAS HAD A POST NASAL DRIP FOR PAST 3 DAYS DUE TO LEAVING WINDOW OPEN AT HOME. HAS OCCASIONAL DRY COUGH, STATES A TICKLE IN THROAT, SINCE. ALSO THIS AM STARTED WITH DIARRHEA X4, STATES WILL GET DIARRHEA PRIOR TO BEING ANXIOUS OVER A PROCEDURE. AND HE IS ANXIOUS TODAY. DR HARDING INFORMED, STATES HE WILL EVALUATE PT

## 2024-03-08 NOTE — ANESTHESIA PREPROCEDURE EVALUATION
Sid Farmer is a 78 y.o. male here for:    Cystoscopy with Dilatation 02245 cystoscopy with dilation Mac sedation,IODINE ALLERGY, DIABETIC  With Louis D'Amico, MD  Pre-Op Diagnosis Codes:     * Stricture of urethral meatus in male, unspecified stricture type [N35.911]     * Blood in urine [R31.0]     * Prostate cancer [C61]    Relevant Problems   Cardiovascular   (+) AF (atrial fibrillation) (CMS/[facility])   (+) Angina, class IV (CMS/[facility])   (+) Atrial arrhythmia   (+) Atrioventricular block, second degree   (+) Benign essential hypertension   (+) CAD S/P percutaneous coronary angioplasty   (+) Cardiac pacemaker in situ   (+) Mixed hyperlipidemia   (+) SSS (sick sinus syndrome) (CMS/[facility])   (+) Ventricular tachycardia (CMS/[facility])      Endocrine   (+) BMI 45.0-49.9, adult (CMS/[facility])   (+) CKD stage 2 due to type 2 diabetes mellitus (CMS/[facility])   (+) Hypothyroidism   (+) Type 2 diabetes mellitus without retinopathy (CMS/[facility])      /Renal   (+) CKD stage 2 due to type 2 diabetes mellitus (CMS/[facility])   (+) Stage 3 chronic kidney disease (CMS/[facility])      Respiratory   (+) Sleep apnea      Digestive   (+) History of gastroesophageal reflux (GERD)      Genitourinary   (+) Prostate cancer (CMS/[facility])     Cardiology OV 2/2024:  His echocardiogram demonstrates normal left ventricular systolic function and no significant valvular abnormalities. His Lexiscan nuclear stress test demonstrates no evidence of myocardial ischemia. There was a small fixed defect in the inferior apical area. I believe this is due to diaphragmatic attenuation. I doubt this is due to infarction. His left ventricular ejection fraction is normal. I explained to him that his tests are satisfactory and he can go ahead with his needed urologic procedure.     Lab Results   Component Value Date    HGB 13.5 02/29/2024    HCT 40.6 (L) 02/29/2024    WBC 6.6 02/29/2024     02/29/2024     02/29/2024    K 4.1 02/29/2024     02/29/2024    CREATININE 1.14  02/29/2024    BUN 15 02/29/2024     NM Stress 1/2024:  IMPRESSION:  Abnormal Lexiscan Myoview cardiac perfusion stress test.  No myocardial ischemia by perfusion imaging.  Small inferior apical fixed perfusion defect by perfusion imaging.  Normal left ventricular systolic function.  Left ventricular ejection fraction 51 %.  Noninvasive risk stratification-low risk.    TTE 1/2024:  CONCLUSIONS:   1. Left ventricular systolic function is normal with a 55-60% estimated ejection fraction.   2. Abnormal septal motion.   3. Spectral Doppler shows an impaired relaxation pattern of left ventricular diastolic filling.   4. Normal right ventricular chamber size and function.      Pacemaker device artifact is noted.   5. Mild (1+) mitral regurgitation.   6. Mild (1+) tricuspid regurgitation with estimated RVSP of 36 mmHg.   7. No previous available for comparison.    Social History     Tobacco Use   Smoking Status Former    Types: Cigarettes   Smokeless Tobacco Never       Allergies   Allergen Reactions    Barium Iodide Rash    Pine Tar Itching and Rash       Current Outpatient Medications   Medication Instructions    albuterol 90 mcg/actuation inhaler INHALE 2 (TWO) PUFFSQ FOUR - 6 (SIX) HOURS AS NEEDED    amLODIPine (Norvasc) 5 mg tablet 1 tablet, oral, Daily    aspirin 81 mg chewable tablet 1 tablet, oral, Daily    cholecalciferol (Vitamin D-3) 125 MCG (5000 UT) capsule Take 1 capsule (125 mcg) by mouth once daily.    cyanocobalamin, vitamin B-12, 2,500 mcg tablet,chewable Chew 2,500 mcg 1 time.    levothyroxine (Synthroid, Levoxyl) 50 mcg tablet 1 tablet, oral, Daily    levothyroxine (Tirosint) 25 mcg capsule Take 1 tablet by mouth on Monday, Wednesday, ad Friday. Take with 50 mcg tablet    losartan (COZAAR) 50 mg, oral, 2 times daily    metoprolol tartrate (LOPRESSOR) 50 mg, oral, 2 times daily    nitroglycerin (Nitrostat) 0.4 mg SL tablet PLACE 1 TABLET UNDER THE TONGUE EVERY 5 MINUTES FOR UP TO 3 DOSES AS NEEDED FOR  CHEST PAIN.CALL 911 IF PAIN PERSISTS.    pravastatin (PRAVACHOL) 40 mg, oral, Daily    tamsulosin (Flomax) 0.4 mg 24 hr capsule 1 capsule, oral, Daily    ubidecarenone (ULTRA COQ10 ORAL) 100 mg, oral, Once       Past Surgical History:   Procedure Laterality Date    CARDIAC CATHETERIZATION      CARDIAC PACEMAKER PLACEMENT      CAROTID STENT      CATARACT EXTRACTION      BILATERAL    COLONOSCOPY      CORONARY ANGIOPLASTY      CORONARY STENT PLACEMENT      HERNIA REPAIR      INTRAOCULAR LENS INSERTION      PROSTATE BIOPSY      PROSTATE SURGERY      RETINAL DETACHMENT SURGERY         Family History   Problem Relation Name Age of Onset    Other (acute myocardial infarction) Mother      Coronary artery disease Mother      Diabetes Mother      Other (ptca) Mother      Coronary artery disease Father      Other (cva) Sister         NPO Details:  NPO/Void Status  Carbohydrate Drink Given Prior to Surgery? : N  Date of Last Liquid: 03/07/24  Time of Last Liquid: 1930 (3/8/24 sip at 0730 with med)  Date of Last Solid: 03/07/24  Time of Last Solid: 1930  Last Intake Type: Clear fluids  Time of Last Void: 1046        Physical Exam    Airway  Mallampati: III  TM distance: >3 FB     Cardiovascular    Dental    Pulmonary    Abdominal            Anesthesia Plan    History of general anesthesia?: yes  History of complications of general anesthesia?: no    ASA 3     MAC     The patient is not a current smoker.    intravenous induction   Anesthetic plan and risks discussed with patient.    Plan discussed with CRNA.

## 2024-03-08 NOTE — INTERVAL H&P NOTE
H&P reviewed. The patient was examined and there are no changes to the H&P.    On today's interview, patient reports that he is voiding stream has gradually begun to slow although it varies but appears to vary more with how much he drinks and a full bladder at which time he has a slightly better flow and other times where it simply trickles  No signs of infection urine clear no dysuria no hematuria no other complaints currently

## 2024-03-08 NOTE — OP NOTE
UROLOGY POSTOP NOTE FOR FRIDAY, 3/8/2024    PREOPERATIVE DIAGNOSIS: [] Recurrent urethral stricture, incomplete bladder emptying, urinary hesitancy, history carcinoma prostate, urinary frequency urgency    POSTOPERATIVE DIAGNOSIS: [] Same as preop    OPERATIVE PROCEDURE: [] Cystourethroscopy with urethral calibration and dilation    ANESTHESIA: [] MAC IV sedation along with 20 to 30 cc of lidocaine Uro-Jet jelly per urethra    ASSISTANTS: [] None    IV FLUIDS: [] As per record    ESTIMATED BLOOD LOSS: [] None    PROCEDURE AS FOLLOWS: []    AN APPROPRIATE HUDDLE AND TIMEOUT PROCEDURE WAS APPROPRIATELY CARRIED OUT BEFORE AND AFTER ANESTHESIA WAS ADMINISTERED IN SATISFACTORY FASHION WITH SURGEON PATIENT AND ALL OPERATING ROOM PERSONNEL PRESENT ACCORDING TO ROUTINE POLICY.    After administration of satisfactory parenteral MAC IV sedation, the patient was placed in lithotomy position, prepped and draped per routine with Hibiclens instead of iodine due to history of allergy, and 2 x 10 CC tubes of Uro-Jet lidocaine jelly administered per urethra for additional local anesthesia    Initially we performed cystourethroscopy with the 17 [place] Olympus rigid cystoscope.  The distal urethra was unremarkable  In the region of the membranous urethra the urethra showed a strictured area that appeared to be about 0.5 to 1 cm in length  The urethra calibrated at about 14-16 [place] in this area utilizing [place] sounds and then after initial dilation we were able to dilate out to 28 [place] with only minimal resistance in this area of the urethra.    Further cystoscopy demonstrated an unremarkable bladder, moderate trabeculation of the bladder throughout, no evidence of any tumors or other suspicious lesions and both right and left ureteral orifice ease were normal in size shape and position and demonstrated clear efflux.  The cystoscope was removed and a 20 [place] coudé tip Farias catheter left indwelling for a short period after  the procedure  Patient tolerated procedure well and was returned to the outpatient recovery discharge area in satisfactory condition.  The Farias catheter will be removed in about 1 hour postop with a 300 cc normal saline voiding trial    Follow-up    Urine culture was sent today and is pending results  Repeat procedure in approximately 4 to 6 months unless patient has any significant reduction in flow any sooner

## 2024-03-08 NOTE — ANESTHESIA POSTPROCEDURE EVALUATION
Patient: Sid Farmer    Procedure Summary       Date: 03/08/24 Room / Location: [facility] / [facility]    Anesthesia Start: 1309 Anesthesia Stop: 1345    Procedure: Cystoscopy with Dilatation Diagnosis:       Stricture of urethral meatus in male, unspecified stricture type      Gross hematuria      Malignant neoplasm of prostate (CMS/HCC)      (male stricture, hematuria, hx prostate cancer)    Surgeons: Louis D'Amico, MD Responsible Provider: Reji Trivedi MD    Anesthesia Type: MAC ASA Status: 3            Anesthesia Type: MAC    Vitals Value Taken Time   /64 03/08/24 1345   Temp 36.0 03/08/24 1345   Pulse 60 03/08/24 1345   Resp 14 03/08/24 1345   SpO2 98% 03/08/24 1345       Anesthesia Post Evaluation    Patient location during evaluation: PACU  Patient participation: complete - patient participated  Level of consciousness: sleepy but conscious  Pain management: adequate  Airway patency: patent  Cardiovascular status: acceptable, blood pressure returned to baseline and hemodynamically stable  Respiratory status: acceptable, nonlabored ventilation, spontaneous ventilation, nasal airway, face mask and unassisted  Hydration status: acceptable  Postoperative Nausea and Vomiting: none      There were no known notable events for this encounter.

## 2024-03-08 NOTE — DISCHARGE INSTRUCTIONS
General Anesthesia Discharge Instructions    About this topic  You may need general anesthesia if you need to be asleep during a procedure. Your doctor will use drugs to block the signals that go from your nerves to your brain. Doctors give general anesthesia during a surgery or procedure to:  Allow you to sleep  Help your body be still  Relax your muscles  Help you to relax and be pain free  Keep you from remembering the surgery  Let the doctor manage your airway, breathing, and blood flow  The doctor or nurse anesthetist gives general anesthesia by a shot into your vein. Sometimes, you may breathe in a gas through a mask placed over your face.  What care is needed at home?  Ask your doctor what you need to do when you go home. Make sure you ask questions if you do not understand what the doctor says.  Your doctor may give you drugs to prevent or treat an upset stomach from the anesthetic. Take them as ordered.  If your throat is sore, suck on ice chips or popsicles to ease throat pain.  Put 2 to 3 pillows under your head and back when you lie down to help you breathe easier.  For the first 24 to 48 hours:  Do not operate heavy or dangerous machinery.  Do not make major decisions or sign important papers. You may not be able to think clearly.  Avoid beer, wine, or mixed drinks.  You are at a higher risk of falling for at least 24 hours after general anesthesia.  Take extra care when you get up.  Do not change positions quickly.  Do not rush when you need to go to the bathroom or to answer the phone.  Ask for help if you feel unsteady when you try to walk.  Wear shoes with non-slip soles and low heels.  What follow-up care is needed?  Your doctor may ask you to come back to the office to check on your progress. Be sure to keep these visits.  If you have stitches that do not dissolve or staples, you will need to have them removed. Your doctor will want to do this in 1 to 2 weeks. If the doctor used skin glue, the  glue will fall off on its own.  What drugs may be needed?  The doctor may order drugs to:  Help with pain  Treat an upset stomach or throwing up  Will physical activity be limited?  You will not be allowed to drive right away after the procedure. Ask a family member or a friend to drive you home.  Avoid trying to get out of bed without help until you are sure of your balance.  You may have to limit your activity. Talk to your doctor about if you need to limit how much you lift or limit exercise after your procedure.  What changes to diet are needed?  Start with a light diet when you are fully awake. This includes things that are easy to swallow like soups, pudding, jello, toast, and eggs. Slowly progress to your normal diet.  What problems could happen?  Low blood pressure  Breathing problems  Upset stomach or throwing up  Dizziness  Blood clots  Infection  When do I need to call the doctor?  Trouble breathing  Upset stomach or throwing up more than 3 times in the next 2 days  Dizziness  Teach Back: Helping You Understand  The Teach Back Method helps you understand the information we are giving you. After you talk with the staff, tell them in your own words what you learned. This helps to make sure the staff has described each thing clearly. It also helps to explain things that may have been confusing. Before going home, make sure you can do these:  I can tell you about my procedure.  I can tell you if I need to follow up with my doctor.  I can tell you what is good for me to eat and drink the next day.  I can tell you what I would do if I have trouble breathing, an upset stomach, or dizziness.  Where can I learn more?  [employer]  https://www.nigms.nih.gov/education/pages/factsheet_Anesthesia.aspx  NHS Choices  http://www.nhs.uk/conditions/Anaesthetic-general/Pages/Definition.aspx  Last Reviewed Date  2020-04-22

## 2024-03-13 LAB — BACTERIA UR CULT: NO GROWTH

## 2024-04-08 ENCOUNTER — HOSPITAL ENCOUNTER (OUTPATIENT)
Dept: CARDIOLOGY | Facility: HOSPITAL | Age: 79
Discharge: HOME | End: 2024-04-08
Payer: MEDICARE

## 2024-04-08 ENCOUNTER — OFFICE VISIT (OUTPATIENT)
Dept: CARDIOLOGY | Facility: CLINIC | Age: 79
End: 2024-04-08
Payer: MEDICARE

## 2024-04-08 ENCOUNTER — LAB (OUTPATIENT)
Dept: LAB | Facility: LAB | Age: 79
End: 2024-04-08
Payer: MEDICARE

## 2024-04-08 ENCOUNTER — HOSPITAL ENCOUNTER (OUTPATIENT)
Dept: RADIOLOGY | Facility: HOSPITAL | Age: 79
Discharge: HOME | End: 2024-04-08
Payer: MEDICARE

## 2024-04-08 VITALS
SYSTOLIC BLOOD PRESSURE: 136 MMHG | HEART RATE: 60 BPM | WEIGHT: 267 LBS | BODY MASS INDEX: 45.58 KG/M2 | HEIGHT: 64 IN | DIASTOLIC BLOOD PRESSURE: 66 MMHG

## 2024-04-08 DIAGNOSIS — I49.5 SYNCOPE DUE TO SICK SINUS SYNDROME (MULTI): ICD-10-CM

## 2024-04-08 DIAGNOSIS — C61 MALIGNANT NEOPLASM OF PROSTATE (MULTI): ICD-10-CM

## 2024-04-08 DIAGNOSIS — E03.9 HYPOTHYROIDISM, UNSPECIFIED TYPE: ICD-10-CM

## 2024-04-08 DIAGNOSIS — R97.20 ELEVATED PROSTATE SPECIFIC ANTIGEN (PSA): ICD-10-CM

## 2024-04-08 DIAGNOSIS — N40.1 BENIGN PROSTATIC HYPERPLASIA WITH LOWER URINARY TRACT SYMPTOMS: Primary | ICD-10-CM

## 2024-04-08 DIAGNOSIS — N40.1 BENIGN PROSTATIC HYPERPLASIA WITH LOWER URINARY TRACT SYMPTOMS: ICD-10-CM

## 2024-04-08 DIAGNOSIS — I87.2 CHRONIC VENOUS INSUFFICIENCY: ICD-10-CM

## 2024-04-08 DIAGNOSIS — I47.20 VENTRICULAR TACHYCARDIA (MULTI): ICD-10-CM

## 2024-04-08 DIAGNOSIS — E11.9 TYPE 2 DIABETES MELLITUS WITHOUT RETINOPATHY (MULTI): ICD-10-CM

## 2024-04-08 DIAGNOSIS — N31.8 OTHER NEUROMUSCULAR DYSFUNCTION OF BLADDER: ICD-10-CM

## 2024-04-08 DIAGNOSIS — E78.2 MIXED HYPERLIPIDEMIA: ICD-10-CM

## 2024-04-08 DIAGNOSIS — R55 SYNCOPE DUE TO SICK SINUS SYNDROME (MULTI): ICD-10-CM

## 2024-04-08 DIAGNOSIS — I44.1 ATRIOVENTRICULAR BLOCK, SECOND DEGREE: ICD-10-CM

## 2024-04-08 DIAGNOSIS — Z95.0 CARDIAC PACEMAKER IN SITU: ICD-10-CM

## 2024-04-08 DIAGNOSIS — I25.10 CAD S/P PERCUTANEOUS CORONARY ANGIOPLASTY: ICD-10-CM

## 2024-04-08 DIAGNOSIS — R07.89 CHEST DISCOMFORT: ICD-10-CM

## 2024-04-08 DIAGNOSIS — I10 BENIGN ESSENTIAL HYPERTENSION: ICD-10-CM

## 2024-04-08 DIAGNOSIS — I20.9 ANGINA, CLASS IV (CMS-HCC): ICD-10-CM

## 2024-04-08 DIAGNOSIS — I48.0 PAROXYSMAL ATRIAL FIBRILLATION (MULTI): ICD-10-CM

## 2024-04-08 DIAGNOSIS — R42 VERTIGO: ICD-10-CM

## 2024-04-08 DIAGNOSIS — N35.911 UNSPECIFIED URETHRAL STRICTURE, MALE, MEATAL: ICD-10-CM

## 2024-04-08 DIAGNOSIS — Z98.61 CAD S/P PERCUTANEOUS CORONARY ANGIOPLASTY: ICD-10-CM

## 2024-04-08 DIAGNOSIS — N13.8 OTHER OBSTRUCTIVE AND REFLUX UROPATHY: ICD-10-CM

## 2024-04-08 DIAGNOSIS — I49.5 SSS (SICK SINUS SYNDROME) (MULTI): Primary | ICD-10-CM

## 2024-04-08 DIAGNOSIS — E66.01 MORBID OBESITY (MULTI): ICD-10-CM

## 2024-04-08 DIAGNOSIS — Z87.898 PERSONAL HISTORY OF OTHER SPECIFIED CONDITIONS: ICD-10-CM

## 2024-04-08 PROBLEM — T82.9XXA DISORDER OF CARDIAC PACEMAKER ELECTRODE: Status: RESOLVED | Noted: 2023-08-29 | Resolved: 2024-04-08

## 2024-04-08 PROBLEM — I49.8 ATRIAL ARRHYTHMIA: Status: RESOLVED | Noted: 2023-08-29 | Resolved: 2024-04-08

## 2024-04-08 LAB
ANION GAP SERPL CALC-SCNC: 12 MMOL/L (ref 10–20)
APPEARANCE UR: CLEAR
BASOPHILS # BLD AUTO: 0.14 X10*3/UL (ref 0–0.1)
BASOPHILS NFR BLD AUTO: 2 %
BILIRUB UR STRIP.AUTO-MCNC: NEGATIVE MG/DL
BUN SERPL-MCNC: 14 MG/DL (ref 6–23)
CALCIUM SERPL-MCNC: 8.9 MG/DL (ref 8.6–10.3)
CHLORIDE SERPL-SCNC: 105 MMOL/L (ref 98–107)
CO2 SERPL-SCNC: 25 MMOL/L (ref 21–32)
COLOR UR: COLORLESS
CREAT SERPL-MCNC: 1.24 MG/DL (ref 0.5–1.3)
EGFRCR SERPLBLD CKD-EPI 2021: 60 ML/MIN/1.73M*2
EOSINOPHIL # BLD AUTO: 0.31 X10*3/UL (ref 0–0.4)
EOSINOPHIL NFR BLD AUTO: 4.4 %
ERYTHROCYTE [DISTWIDTH] IN BLOOD BY AUTOMATED COUNT: 13.1 % (ref 11.5–14.5)
GLUCOSE SERPL-MCNC: 102 MG/DL (ref 74–99)
GLUCOSE UR STRIP.AUTO-MCNC: NEGATIVE MG/DL
HCT VFR BLD AUTO: 42.2 % (ref 41–52)
HGB BLD-MCNC: 13.6 G/DL (ref 13.5–17.5)
IMM GRANULOCYTES # BLD AUTO: 0.04 X10*3/UL (ref 0–0.5)
IMM GRANULOCYTES NFR BLD AUTO: 0.6 % (ref 0–0.9)
KETONES UR STRIP.AUTO-MCNC: NEGATIVE MG/DL
LEUKOCYTE ESTERASE UR QL STRIP.AUTO: NEGATIVE
LYMPHOCYTES # BLD AUTO: 1.13 X10*3/UL (ref 0.8–3)
LYMPHOCYTES NFR BLD AUTO: 15.9 %
MCH RBC QN AUTO: 28.5 PG (ref 26–34)
MCHC RBC AUTO-ENTMCNC: 32.2 G/DL (ref 32–36)
MCV RBC AUTO: 89 FL (ref 80–100)
MONOCYTES # BLD AUTO: 0.75 X10*3/UL (ref 0.05–0.8)
MONOCYTES NFR BLD AUTO: 10.6 %
NEUTROPHILS # BLD AUTO: 4.72 X10*3/UL (ref 1.6–5.5)
NEUTROPHILS NFR BLD AUTO: 66.5 %
NITRITE UR QL STRIP.AUTO: NEGATIVE
NRBC BLD-RTO: 0 /100 WBCS (ref 0–0)
PH UR STRIP.AUTO: 6 [PH]
PLATELET # BLD AUTO: 195 X10*3/UL (ref 150–450)
POTASSIUM SERPL-SCNC: 4.2 MMOL/L (ref 3.5–5.3)
PROT UR STRIP.AUTO-MCNC: NEGATIVE MG/DL
PSA SERPL-MCNC: 0.15 NG/ML
RBC # BLD AUTO: 4.77 X10*6/UL (ref 4.5–5.9)
RBC # UR STRIP.AUTO: NEGATIVE /UL
SODIUM SERPL-SCNC: 138 MMOL/L (ref 136–145)
SP GR UR STRIP.AUTO: 1
UROBILINOGEN UR STRIP.AUTO-MCNC: <2 MG/DL
WBC # BLD AUTO: 7.1 X10*3/UL (ref 4.4–11.3)

## 2024-04-08 PROCEDURE — 80048 BASIC METABOLIC PNL TOTAL CA: CPT

## 2024-04-08 PROCEDURE — 3078F DIAST BP <80 MM HG: CPT | Performed by: NURSE PRACTITIONER

## 2024-04-08 PROCEDURE — 85025 COMPLETE CBC W/AUTO DIFF WBC: CPT

## 2024-04-08 PROCEDURE — 3075F SYST BP GE 130 - 139MM HG: CPT | Performed by: NURSE PRACTITIONER

## 2024-04-08 PROCEDURE — 1160F RVW MEDS BY RX/DR IN RCRD: CPT | Performed by: NURSE PRACTITIONER

## 2024-04-08 PROCEDURE — 93280 PM DEVICE PROGR EVAL DUAL: CPT

## 2024-04-08 PROCEDURE — 36415 COLL VENOUS BLD VENIPUNCTURE: CPT

## 2024-04-08 PROCEDURE — 93280 PM DEVICE PROGR EVAL DUAL: CPT | Performed by: INTERNAL MEDICINE

## 2024-04-08 PROCEDURE — 87086 URINE CULTURE/COLONY COUNT: CPT

## 2024-04-08 PROCEDURE — 81003 URINALYSIS AUTO W/O SCOPE: CPT

## 2024-04-08 PROCEDURE — 99214 OFFICE O/P EST MOD 30 MIN: CPT | Performed by: NURSE PRACTITIONER

## 2024-04-08 PROCEDURE — 1036F TOBACCO NON-USER: CPT | Performed by: NURSE PRACTITIONER

## 2024-04-08 PROCEDURE — 1159F MED LIST DOCD IN RCRD: CPT | Performed by: NURSE PRACTITIONER

## 2024-04-08 PROCEDURE — 76770 US EXAM ABDO BACK WALL COMP: CPT

## 2024-04-08 PROCEDURE — 84153 ASSAY OF PSA TOTAL: CPT

## 2024-04-08 PROCEDURE — 76770 US EXAM ABDO BACK WALL COMP: CPT | Performed by: STUDENT IN AN ORGANIZED HEALTH CARE EDUCATION/TRAINING PROGRAM

## 2024-04-08 RX ORDER — NITROGLYCERIN 0.4 MG/1
0.4 TABLET SUBLINGUAL EVERY 5 MIN PRN
Qty: 20 TABLET | Refills: 2 | Status: SHIPPED | OUTPATIENT
Start: 2024-04-08

## 2024-04-08 RX ORDER — LEVOTHYROXINE SODIUM 25 UG/1
25 TABLET ORAL 3 TIMES WEEKLY
COMMUNITY

## 2024-04-08 NOTE — PROGRESS NOTES
"CARDIOLOGY OFFICE VISIT      CHIEF COMPLAINT  Chief Complaint   Patient presents with    Follow-up     6 month with device check     Chief complaint: \"I have been noticing a lot of wheezing lately.\"    HISTORY OF PRESENT ILLNESS  HPI  History: The patient is a 78-year-old  male who is followed for sinus node dysfunction status post dual-chamber pacemaker implant on September 21, 2011 and generator change on July 16, 2020 for KEM parameters.  He does have transient noise on his atrial and ventricular leads ongoing since February 6, 2022 with history of atrial lead oversensing.  No inhibition of pacing has been noted.  Additionally is followed for coronary artery disease status post PTCA with drug-eluting stent to the proximal and mid LAD and distal RCA per left heart catheterization dated March 4, 2016 with recommendation for medical management and Lexiscan stress test dated January 31, 2024 revealing no ischemia with a small inferior apical fixed defect and an ejection fraction of 51%.  He has a history of hypertension, dyslipidemia, obstructive sleep apnea, hypothyroidism on replacement therapy, morbid obesity, vertigo, and seasonal allergies.  He states that he has been wheezing quite a bit most recently.  He does have an inhaler that he does not use.  He denies productive cough, fever or chills.  He does have persistent lower extremity swelling with history of chronic venous insufficiency.  He denies chest pain, palpitations, dizziness, lightheadedness, abdominal distention, or orthopnea.  Past Medical History  Past Medical History:   Diagnosis Date    Anemia     Anxiety     Arthritis     Atrial arrhythmia     Atrial fibrillation (CMS/HCC)     Basal cell carcinoma     Bladder spasms     BPH (benign prostatic hyperplasia)     Cataract     CKD (chronic kidney disease)     Stage 2 d/t DM2    Constipation     Coronary artery disease     COVID-19     NOT VACCINATED    Depression     Diabetes mellitus " (CMS/[facility])     no medication; elevation of HgA1C    Diverticulosis     Dizziness     GERD (gastroesophageal reflux disease)     History of morbid obesity     History of OCD (obsessive compulsive disorder)     Hypertension     Hypothyroidism     Joint pain     lower back    Prostate cancer (CMS/HCC)     Rectal pain     Second degree AV block     Second degree AV block     Shortness of breath     use of inhaler if needed    Sleep apnea     uses BiPAP    SSS (sick sinus syndrome) (CMS/[facility])     Syncope     Urgency incontinence     Ventricular tachycardia (CMS/[facility])     Vertigo     Wears glasses        Social History  Social History     Tobacco Use    Smoking status: Former     Types: Cigarettes    Smokeless tobacco: Never   Vaping Use    Vaping Use: Never used   Substance Use Topics    Alcohol use: Not Currently     Comment: rare, New Years    Drug use: Never       Family History     Family History   Problem Relation Name Age of Onset    Other (acute myocardial infarction) Mother      Coronary artery disease Mother      Diabetes Mother      Other (ptca) Mother      Coronary artery disease Father      Other (cva) Sister          Allergies:  Allergies   Allergen Reactions    Barium Iodide Rash    Pine Tar Itching and Rash        Outpatient Medications:  Current Outpatient Medications   Medication Instructions    albuterol 90 mcg/actuation inhaler INHALE 2 (TWO) PUFFSQ FOUR - 6 (SIX) HOURS AS NEEDED    amLODIPine (Norvasc) 5 mg tablet 1 tablet, oral, Daily    aspirin 81 mg chewable tablet 1 tablet, oral, Daily    cholecalciferol (Vitamin D-3) 125 MCG (5000 UT) capsule Take 1 capsule (125 mcg) by mouth once daily.    cyanocobalamin, vitamin B-12, 2,500 mcg tablet,chewable Chew 2,500 mcg once daily.    levothyroxine (Synthroid, Levoxyl) 50 mcg tablet 1 tablet, oral, Daily    levothyroxine (SYNTHROID, LEVOXYL) 25 mcg, oral, 3 times weekly    losartan (COZAAR) 50 mg, oral, 2 times daily    metoprolol tartrate (LOPRESSOR) 50 mg,  oral, 2 times daily    nitroglycerin (Nitrostat) 0.4 mg SL tablet PLACE 1 TABLET UNDER THE TONGUE EVERY 5 MINUTES FOR UP TO 3 DOSES AS NEEDED FOR CHEST PAIN.CALL 911 IF PAIN PERSISTS.    pravastatin (PRAVACHOL) 40 mg, oral, Daily    tamsulosin (Flomax) 0.4 mg 24 hr capsule 1 capsule, oral, Daily    ubidecarenone (ULTRA COQ10 ORAL) 100 mg, oral, Once          REVIEW OF SYSTEMS  Review of Systems   All other systems reviewed and are negative.        VITALS  Vitals:    04/08/24 1332   BP: 136/66   Pulse: 60       PHYSICAL EXAM  Vitals and nursing note reviewed.   Constitutional:       Appearance: Normal appearance.   HENT:      Head: Normocephalic.   Neck:      Vascular: No JVD.   Cardiovascular:      Rate and Rhythm: Normal rate and regular rhythm.      Pulses: Normal pulses.      Heart sounds: Normal heart sounds.      Trace bilateral pedal and ankle edema.  Pulmonary:      Effort: Pulmonary effort is normal.      Breath sounds: Normal breath sounds.   Abdominal:      General: Bowel sounds are normal.      Palpations: Abdomen is soft.   Musculoskeletal:         General: Normal range of motion.      Cervical back: Normal range of motion.   Skin:     General: Skin is warm and dry.  Left subclavian pacemaker pocket is well-healed without redness swelling or drainage.  Neurological:      General: No focal deficit present.      Mental Status: She is alert and oriented to person, place, and time.      Motor: Motor function is intact.   Psychiatric:         Attention and Perception: Attention and perception normal.         Mood and Affect: Mood and affect normal.         Speech: Speech normal.         Behavior: Behavior normal. Behavior is cooperative.         Thought Content: Thought content normal.         Cognition and Memory: Cognition and memory normal.     Labs and testing: Twelve-lead EKG reveals atrial and ventricular pacing at 60 bpm, prolonged AV conduction with a NY interval of 254 ms.  QRS durations 180 ms, QT  462 ms, QTc 462 ms.  2D echocardiogram dated January 30, 2024 revealed an ejection fraction of 55 to 60%, 1+ MR and TR, right ventricular systolic pressure 36 mmHg, and left atrial size of 4 cm.  Pacemaker interrogation dated April 8, 2024 reveals atrial pacing 93% and ventricular pacing 99.76%.  3 AMS episodes were noted with the AT/AF burden of 0%.  No ventricular arrhythmic events were noted.  Estimated battery longevity is 5 years and 4 months.      ASSESSMENT AND PLAN    Clinical impressions:  1. Sinus node dysfunction with sinus pauses up to 2.4 seconds in duration status post dual-chamber pacemaker implant on September 21, 2011 with generator change out on July 16, 2020 for KEM parameters ([employer] MRI DR).  2. Noise on the atrial and ventricular leads per device interrogation dated February 24, 2022 ongoing since February 6, 2022. History of atrial lead oversensing.  3. Nonsustained ventricular tachycardia per remote device interrogation.  4. Coronary artery disease status post angioplasty with drug-eluting stent to the proximal and mid LAD and distal RCA on May 6, 2011 with left heart catheterization dated March 4, 2016 revealing patent LAD stent with 50% stenosis of the first and second diagonal, first OMB and distal RCA and 30% mid RCA stenosis.  5. Normal left ventricular function per 2D echo dated August 20, 2017.  6. Dyslipidemia on statin with complaints of myalgia.  7. Hypertension, controlled with a blood pressure today 122/64.  8. Obstructive sleep apnea on BiPAP.  9. Morbid obesity with a BMI of 45.83.  10. Hypothyroidism on replacement therapy.  11. Prostate cancer status post radiation therapy and receiving Lupron injections.  12.  Hematuria, resolved status post cystoscopy on March 8, 2024.    Recommendations:  1.  Continue current medications as prescribed.  2.  Obtain a remote pacemaker check on July 11, 2024 and in clinic check in 6 months prior to the office visit with   Brodie.  3.  Follow-up in office with Dr. Calloway in 6 months or sooner if needed.  4.  Continue lifestyle modifications as discussed.  Patient's blood pressure today was 136/66.  The patient reports that he did not take his blood pressure medicine yet today due to the numerous tests that he was undergoing he did not have an opportunity to take his medication.  Lifestyle modifications were reviewed regarding sodium restriction and increasing water consumption.    Evaluation and note by Sarina Hsu, CNP  **Please excuse any errors in grammar or translation related to this dictation.  Voice recognition software was utilized to prepare this document.**

## 2024-04-09 LAB — BACTERIA UR CULT: NO GROWTH

## 2024-05-22 ENCOUNTER — APPOINTMENT (OUTPATIENT)
Dept: CARDIOLOGY | Facility: CLINIC | Age: 79
End: 2024-05-22
Payer: MEDICARE

## 2024-07-02 ENCOUNTER — HOSPITAL ENCOUNTER (OUTPATIENT)
Dept: NEUROLOGY | Facility: HOSPITAL | Age: 79
Discharge: HOME | End: 2024-07-02
Payer: MEDICARE

## 2024-07-02 DIAGNOSIS — M79.643 PAIN IN UNSPECIFIED HAND: ICD-10-CM

## 2024-07-02 DIAGNOSIS — R20.2 PARESTHESIA OF SKIN: ICD-10-CM

## 2024-07-02 PROCEDURE — 95910 NRV CNDJ TEST 7-8 STUDIES: CPT

## 2024-07-11 ENCOUNTER — HOSPITAL ENCOUNTER (OUTPATIENT)
Dept: CARDIOLOGY | Facility: HOSPITAL | Age: 79
Discharge: HOME | End: 2024-07-11
Payer: MEDICARE

## 2024-07-11 DIAGNOSIS — I44.2 ATRIOVENTRICULAR BLOCK, COMPLETE (MULTI): ICD-10-CM

## 2024-07-11 DIAGNOSIS — Z95.0 CARDIAC PACEMAKER IN SITU: ICD-10-CM

## 2024-07-11 PROCEDURE — 93296 REM INTERROG EVL PM/IDS: CPT

## 2024-07-22 ENCOUNTER — APPOINTMENT (OUTPATIENT)
Dept: ORTHOPEDIC SURGERY | Facility: CLINIC | Age: 79
End: 2024-07-22
Payer: MEDICARE

## 2024-08-07 ENCOUNTER — APPOINTMENT (OUTPATIENT)
Dept: CARDIOLOGY | Facility: CLINIC | Age: 79
End: 2024-08-07
Payer: MEDICARE

## 2024-08-12 ENCOUNTER — APPOINTMENT (OUTPATIENT)
Dept: ORTHOPEDIC SURGERY | Facility: CLINIC | Age: 79
End: 2024-08-12
Payer: MEDICARE

## 2024-08-12 DIAGNOSIS — G56.03 BILATERAL CARPAL TUNNEL SYNDROME: Primary | ICD-10-CM

## 2024-08-12 PROCEDURE — 99203 OFFICE O/P NEW LOW 30 MIN: CPT | Performed by: ORTHOPAEDIC SURGERY

## 2024-08-12 NOTE — PROGRESS NOTES
8/12/2024    Chief Complaint   Patient presents with    Left Hand - Pain     Bi lat hand CTS  S/p bracing and emg    Right Hand - Pain       History of Present Illness:  Patient Sid Farmer , 79 y.o. male, presents today, 8/12/2024, for evaluation of bilateral hand pain and numbness.  This has been an intermittent problem for the last few years.  He had EMG done in 2023 that showed some evidence of carpal tunnel syndrome.  However, this spring in April or May symptoms of numbness and tingling and burning that was waking him from sleep at night worsen.  He had repeat EMG which showed some worsening of his carpal tunnel syndrome.  He was recommended to get braces.  He has been wearing these at night and they have been helping substantially.  He also feels he increase his activity and computer usage and he feels this is actually helped his symptoms overall and he is currently fairly asymptomatic.  Originally symptoms are worse on the left in comparison of the right but he is left-hand-dominant dividual.  He has history of type 2 diabetes, A-fib, chronic kidney disease, hypertension.       Review of Systems:   GENERAL: Negative  GI: Negative  MUSCULOSKELETAL: See HPI  SKIN: Negative  NEURO:  Negative     Physical Exam:  GENERAL:  Alert and oriented to person, place, and time.  No acute distress and breathing comfortably; pleasant and cooperative with the examination.  HEENT:  Head is normocephalic and atraumatic.  NECK:  Supple, no visible swelling.  CARDIOVASCULAR:  No palpable tachycardia.  LUNGS:  No audible wheezing or labored breathing.  ABDOMEN:  Nondistended.  Extremities: Evaluation of bilateral upper extremities finds the patient to have a palpable radial artery at the wrist with brisk capillary refill to all digits. The patient has intact sensorium to axillary, radial, median and ulnar nerves. There are no open wounds. There are no signs of infection. There is no evidence of lymphedema or lymphatic  streaking. The patient has supple compartments of the bilateral arms, forearms and hands.  Positive Tinel's over the median nerves bilaterally.  Positive direct compression maneuver bilaterally.  Positive Phalen's maneuver on the left.     Imaging/Test Results:  EMG from July 2024 showed evidence of bilateral carpal tunnel syndrome, slightly worse than previous study.     Assessment:  Bilateral carpal tunnel syndrome, currently asymptomatic.     Plan:  Treatment options including operative and nonoperative strategies were reviewed at length.  He was offered the option of injection, but he declines today in favor of simple observation and continuance of bracing regiment for now.  We did discuss possible need for surgery in future if symptoms worsen.  For now, he elects for continued activities to tolerance and follow-up with our office on as-needed basis if symptoms dictate.  We do anticipate return in the future.  All questions answered at today's visit.    In a face to face encounter, I performed a history and physical examination, discussed pertinent diagnostic studies if indicated, and discussed diagnosis and management strategies with both the patient and the mid-level provider. I reviewed the mid-level's note and agree with the documented findings and plan of care.  Patient presents today for evaluation of the bilateral upper extremities.  He describes symptoms compatible with bilateral carpal tunnel syndrome recently improved.  As of now he is having no symptoms.  His exam does show positive Tinel's over course of median nerve to bilateral wrist.  EMG shows evidence for bilateral carpal tunnel syndrome.  He has been bracing intermittently.  We recommend for persistence of the bracing regimen and to follow-up with me if and when symptoms dictate.

## 2024-08-21 ENCOUNTER — LAB (OUTPATIENT)
Dept: LAB | Facility: LAB | Age: 79
End: 2024-08-21
Payer: MEDICARE

## 2024-08-21 DIAGNOSIS — E03.9 HYPOTHYROIDISM, UNSPECIFIED: ICD-10-CM

## 2024-08-21 DIAGNOSIS — D64.9 ANEMIA, UNSPECIFIED: ICD-10-CM

## 2024-08-21 DIAGNOSIS — I10 ESSENTIAL (PRIMARY) HYPERTENSION: ICD-10-CM

## 2024-08-21 DIAGNOSIS — Z13.220 ENCOUNTER FOR SCREENING FOR LIPOID DISORDERS: ICD-10-CM

## 2024-08-21 DIAGNOSIS — Z13.29 ENCOUNTER FOR SCREENING FOR OTHER SUSPECTED ENDOCRINE DISORDER: ICD-10-CM

## 2024-08-21 DIAGNOSIS — E11.9 TYPE 2 DIABETES MELLITUS WITHOUT COMPLICATIONS (MULTI): Primary | ICD-10-CM

## 2024-08-21 DIAGNOSIS — E55.9 VITAMIN D DEFICIENCY, UNSPECIFIED: ICD-10-CM

## 2024-08-21 LAB
25(OH)D3 SERPL-MCNC: 36 NG/ML (ref 30–100)
ALBUMIN SERPL BCP-MCNC: 4.1 G/DL (ref 3.4–5)
ALP SERPL-CCNC: 54 U/L (ref 33–136)
ALT SERPL W P-5'-P-CCNC: 20 U/L (ref 10–52)
ANION GAP SERPL CALC-SCNC: 13 MMOL/L (ref 10–20)
APPEARANCE UR: CLEAR
AST SERPL W P-5'-P-CCNC: 17 U/L (ref 9–39)
BASOPHILS # BLD AUTO: 0.15 X10*3/UL (ref 0–0.1)
BASOPHILS NFR BLD AUTO: 2.5 %
BILIRUB SERPL-MCNC: 0.8 MG/DL (ref 0–1.2)
BILIRUB UR STRIP.AUTO-MCNC: NEGATIVE MG/DL
BUN SERPL-MCNC: 26 MG/DL (ref 6–23)
CALCIUM SERPL-MCNC: 9.1 MG/DL (ref 8.6–10.3)
CHLORIDE SERPL-SCNC: 106 MMOL/L (ref 98–107)
CHOLEST SERPL-MCNC: 162 MG/DL (ref 0–199)
CHOLESTEROL/HDL RATIO: 5.5
CO2 SERPL-SCNC: 25 MMOL/L (ref 21–32)
COLOR UR: NORMAL
CREAT SERPL-MCNC: 1.38 MG/DL (ref 0.5–1.3)
CREAT UR-MCNC: 152.9 MG/DL (ref 20–370)
EGFRCR SERPLBLD CKD-EPI 2021: 52 ML/MIN/1.73M*2
EOSINOPHIL # BLD AUTO: 0.25 X10*3/UL (ref 0–0.4)
EOSINOPHIL NFR BLD AUTO: 4.2 %
ERYTHROCYTE [DISTWIDTH] IN BLOOD BY AUTOMATED COUNT: 12.8 % (ref 11.5–14.5)
EST. AVERAGE GLUCOSE BLD GHB EST-MCNC: 134 MG/DL
FERRITIN SERPL-MCNC: 138 NG/ML (ref 20–300)
FOLATE SERPL-MCNC: 7.9 NG/ML
GLUCOSE SERPL-MCNC: 112 MG/DL (ref 74–99)
GLUCOSE UR STRIP.AUTO-MCNC: NORMAL MG/DL
HBA1C MFR BLD: 6.3 %
HCT VFR BLD AUTO: 43.9 % (ref 41–52)
HDLC SERPL-MCNC: 29.5 MG/DL
HGB BLD-MCNC: 14 G/DL (ref 13.5–17.5)
IMM GRANULOCYTES # BLD AUTO: 0.07 X10*3/UL (ref 0–0.5)
IMM GRANULOCYTES NFR BLD AUTO: 1.2 % (ref 0–0.9)
IRON SATN MFR SERPL: 20 % (ref 25–45)
IRON SERPL-MCNC: 80 UG/DL (ref 35–150)
KETONES UR STRIP.AUTO-MCNC: NEGATIVE MG/DL
LDLC SERPL CALC-MCNC: 92 MG/DL
LEUKOCYTE ESTERASE UR QL STRIP.AUTO: NEGATIVE
LYMPHOCYTES # BLD AUTO: 1.16 X10*3/UL (ref 0.8–3)
LYMPHOCYTES NFR BLD AUTO: 19.7 %
MCH RBC QN AUTO: 28.9 PG (ref 26–34)
MCHC RBC AUTO-ENTMCNC: 31.9 G/DL (ref 32–36)
MCV RBC AUTO: 91 FL (ref 80–100)
MICROALBUMIN UR-MCNC: <7 MG/L
MICROALBUMIN/CREAT UR: NORMAL MG/G{CREAT}
MONOCYTES # BLD AUTO: 0.81 X10*3/UL (ref 0.05–0.8)
MONOCYTES NFR BLD AUTO: 13.8 %
NEUTROPHILS # BLD AUTO: 3.45 X10*3/UL (ref 1.6–5.5)
NEUTROPHILS NFR BLD AUTO: 58.6 %
NITRITE UR QL STRIP.AUTO: NEGATIVE
NON HDL CHOLESTEROL: 133 MG/DL (ref 0–149)
NRBC BLD-RTO: 0 /100 WBCS (ref 0–0)
PH UR STRIP.AUTO: 5.5 [PH]
PLATELET # BLD AUTO: 200 X10*3/UL (ref 150–450)
POTASSIUM SERPL-SCNC: 4.3 MMOL/L (ref 3.5–5.3)
PROT SERPL-MCNC: 6.3 G/DL (ref 6.4–8.2)
PROT UR STRIP.AUTO-MCNC: NEGATIVE MG/DL
RBC # BLD AUTO: 4.85 X10*6/UL (ref 4.5–5.9)
RBC # UR STRIP.AUTO: NEGATIVE /UL
SODIUM SERPL-SCNC: 140 MMOL/L (ref 136–145)
SP GR UR STRIP.AUTO: 1.02
TIBC SERPL-MCNC: 403 UG/DL (ref 240–445)
TRIGL SERPL-MCNC: 205 MG/DL (ref 0–149)
TSH SERPL-ACNC: 3.49 MIU/L (ref 0.44–3.98)
UIBC SERPL-MCNC: 323 UG/DL (ref 110–370)
UROBILINOGEN UR STRIP.AUTO-MCNC: NORMAL MG/DL
VIT B12 SERPL-MCNC: 338 PG/ML (ref 211–911)
VLDL: 41 MG/DL (ref 0–40)
WBC # BLD AUTO: 5.9 X10*3/UL (ref 4.4–11.3)

## 2024-08-21 PROCEDURE — 80053 COMPREHEN METABOLIC PANEL: CPT

## 2024-08-21 PROCEDURE — 81003 URINALYSIS AUTO W/O SCOPE: CPT

## 2024-08-21 PROCEDURE — 82746 ASSAY OF FOLIC ACID SERUM: CPT

## 2024-08-21 PROCEDURE — 83550 IRON BINDING TEST: CPT

## 2024-08-21 PROCEDURE — 36415 COLL VENOUS BLD VENIPUNCTURE: CPT

## 2024-08-21 PROCEDURE — 85025 COMPLETE CBC W/AUTO DIFF WBC: CPT

## 2024-08-21 PROCEDURE — 82043 UR ALBUMIN QUANTITATIVE: CPT

## 2024-08-21 PROCEDURE — 82728 ASSAY OF FERRITIN: CPT

## 2024-08-21 PROCEDURE — 83036 HEMOGLOBIN GLYCOSYLATED A1C: CPT

## 2024-08-21 PROCEDURE — 82306 VITAMIN D 25 HYDROXY: CPT

## 2024-08-21 PROCEDURE — 82607 VITAMIN B-12: CPT

## 2024-08-21 PROCEDURE — 82570 ASSAY OF URINE CREATININE: CPT

## 2024-08-21 PROCEDURE — 83540 ASSAY OF IRON: CPT

## 2024-08-21 PROCEDURE — 84443 ASSAY THYROID STIM HORMONE: CPT

## 2024-08-21 PROCEDURE — 80061 LIPID PANEL: CPT

## 2024-10-08 ENCOUNTER — APPOINTMENT (OUTPATIENT)
Dept: CARDIOLOGY | Facility: CLINIC | Age: 79
End: 2024-10-08
Payer: MEDICARE

## 2024-10-08 ENCOUNTER — HOSPITAL ENCOUNTER (OUTPATIENT)
Dept: CARDIOLOGY | Facility: HOSPITAL | Age: 79
Discharge: HOME | End: 2024-10-08
Payer: MEDICARE

## 2024-10-08 VITALS
SYSTOLIC BLOOD PRESSURE: 126 MMHG | HEART RATE: 60 BPM | DIASTOLIC BLOOD PRESSURE: 68 MMHG | BODY MASS INDEX: 46.61 KG/M2 | WEIGHT: 273 LBS | HEIGHT: 64 IN

## 2024-10-08 DIAGNOSIS — Z98.61 CAD S/P PERCUTANEOUS CORONARY ANGIOPLASTY: ICD-10-CM

## 2024-10-08 DIAGNOSIS — Z95.0 CARDIAC PACEMAKER IN SITU: ICD-10-CM

## 2024-10-08 DIAGNOSIS — I49.5 SSS (SICK SINUS SYNDROME) (MULTI): ICD-10-CM

## 2024-10-08 DIAGNOSIS — E11.9 TYPE 2 DIABETES MELLITUS WITHOUT RETINOPATHY (MULTI): ICD-10-CM

## 2024-10-08 DIAGNOSIS — I47.20 VENTRICULAR TACHYCARDIA (MULTI): ICD-10-CM

## 2024-10-08 DIAGNOSIS — I25.10 CAD S/P PERCUTANEOUS CORONARY ANGIOPLASTY: ICD-10-CM

## 2024-10-08 DIAGNOSIS — I48.0 PAROXYSMAL ATRIAL FIBRILLATION (MULTI): ICD-10-CM

## 2024-10-08 DIAGNOSIS — I44.1 ATRIOVENTRICULAR BLOCK, SECOND DEGREE: ICD-10-CM

## 2024-10-08 DIAGNOSIS — G47.30 SLEEP APNEA, UNSPECIFIED TYPE: ICD-10-CM

## 2024-10-08 DIAGNOSIS — Z87.891 FORMER CIGARETTE SMOKER: ICD-10-CM

## 2024-10-08 DIAGNOSIS — E78.2 MIXED HYPERLIPIDEMIA: ICD-10-CM

## 2024-10-08 DIAGNOSIS — I10 BENIGN ESSENTIAL HYPERTENSION: ICD-10-CM

## 2024-10-08 PROCEDURE — 1036F TOBACCO NON-USER: CPT | Performed by: INTERNAL MEDICINE

## 2024-10-08 PROCEDURE — 93280 PM DEVICE PROGR EVAL DUAL: CPT

## 2024-10-08 PROCEDURE — 3074F SYST BP LT 130 MM HG: CPT | Performed by: INTERNAL MEDICINE

## 2024-10-08 PROCEDURE — 3078F DIAST BP <80 MM HG: CPT | Performed by: INTERNAL MEDICINE

## 2024-10-08 PROCEDURE — 99214 OFFICE O/P EST MOD 30 MIN: CPT | Performed by: INTERNAL MEDICINE

## 2024-10-08 PROCEDURE — 93000 ELECTROCARDIOGRAM COMPLETE: CPT | Mod: DISTINCT PROCEDURAL SERVICE | Performed by: INTERNAL MEDICINE

## 2024-10-08 PROCEDURE — 1159F MED LIST DOCD IN RCRD: CPT | Performed by: INTERNAL MEDICINE

## 2024-10-08 ASSESSMENT — ENCOUNTER SYMPTOMS
CARDIOVASCULAR NEGATIVE: 1
MUSCULOSKELETAL NEGATIVE: 1
CONSTITUTIONAL NEGATIVE: 1
RESPIRATORY NEGATIVE: 1
NEUROLOGICAL NEGATIVE: 1

## 2024-10-08 NOTE — PROGRESS NOTES
"Chief Complaint:   Follow-up (6 month with device check)     History Of Present Illness:    Sid Farmer is a 79 y.o. male presenting with follow-up.   Patient denies any arrhythmia symptoms of palpitation, lightheadedness, near syncope, or syncope.    He sleeps about 11 hours with his sleep Mask at night.  In the afternoon he does need to take a nap.  I discussed that he could follow-up with his PCP or pulmonary to determine if he needs further adjustment of his CPAP.  Patient reports that his last sleep study was in 2020    Last Recorded Vitals:  Vitals:    10/08/24 1342   BP: 126/68   BP Location: Left arm   Patient Position: Sitting   Pulse: 60   Weight: 124 kg (273 lb)   Height: 1.626 m (5' 4\")       Past Medical History:  See List.    Past Surgical History:  See List.      Social History:  He reports that he has quit smoking. His smoking use included cigarettes. He has never used smokeless tobacco. He reports that he does not currently use alcohol. He reports that he does not use drugs.    Family History:  Family History   Problem Relation Name Age of Onset    Other (acute myocardial infarction) Mother      Coronary artery disease Mother      Diabetes Mother      Other (ptca) Mother      Coronary artery disease Father      Other (cva) Sister          Allergies:  Barium iodide and Pine tar    Outpatient Medications:  Current Outpatient Medications   Medication Instructions    amLODIPine (Norvasc) 5 mg tablet 1 tablet, oral, Daily    aspirin 81 mg chewable tablet 1 tablet, oral, Daily    cholecalciferol (Vitamin D-3) 125 MCG (5000 UT) capsule Take 1 capsule (125 mcg) by mouth once daily.    cyanocobalamin, vitamin B-12, 2,500 mcg tablet,chewable Chew 2,500 mcg once daily.    levothyroxine (Synthroid, Levoxyl) 50 mcg tablet 1 tablet, oral, Daily    levothyroxine (SYNTHROID, LEVOXYL) 25 mcg, oral, 3 times weekly    losartan (COZAAR) 50 mg, oral, 2 times daily    metoprolol tartrate (LOPRESSOR) 50 mg, oral, 2 " times daily    nitroglycerin (NITROSTAT) 0.4 mg, sublingual, Every 5 min PRN, place 1 tablet under tongue every 5 minutes for up to 3 doses as needed for chest pain. Call 911 if pain persists    pravastatin (PRAVACHOL) 40 mg, oral, Daily    tamsulosin (Flomax) 0.4 mg 24 hr capsule 1 capsule, oral, Daily    ubidecarenone (ULTRA COQ10 ORAL) 100 mg, oral, Once   Review of Systems   Constitutional: Negative.   Cardiovascular: Negative.    Respiratory: Negative.     Musculoskeletal: Negative.    Neurological: Negative.    All other systems reviewed and are negative.  Thyroid medication was adjusted per patient report.        Physical Exam  Cardiovascular:      Rate and Rhythm: Normal rate.      Pulses: Normal pulses.      Heart sounds: Normal heart sounds.      Comments: Left side device  Pulmonary:      Effort: Pulmonary effort is normal.   Musculoskeletal:      Right lower le+ Edema present.      Left lower le+ Edema present.   Neurological:      General: No focal deficit present.      Mental Status: He is alert.            Last Labs:  CBC -  Lab Results   Component Value Date    WBC 5.9 2024    HGB 14.0 2024    HCT 43.9 2024    MCV 91 2024     2024       CMP -  Lab Results   Component Value Date    CALCIUM 9.1 2024    PHOS 3.5 2021    PROT 6.3 (L) 2024    ALBUMIN 4.1 2024    AST 17 2024    ALT 20 2024    ALKPHOS 54 2024    BILITOT 0.8 2024       LIPID PANEL -   Lab Results   Component Value Date    CHOL 162 2024    TRIG 205 (H) 2024    HDL 29.5 2024    CHHDL 5.5 2024    LDLF 76 2022    VLDL 41 (H) 2024    NHDL 133 2024       RENAL FUNCTION PANEL -   Lab Results   Component Value Date    GLUCOSE 112 (H) 2024     2024    K 4.3 2024     2024    CO2 25 2024    ANIONGAP 13 2024    BUN 26 (H) 2024    CREATININE 1.38 (H) 2024     GFRMALE 52 (A) 09/19/2023    CALCIUM 9.1 08/21/2024    PHOS 3.5 09/20/2021    ALBUMIN 4.1 08/21/2024        Lab Results   Component Value Date    BNP 28 01/15/2024    HGBA1C 6.3 (H) 08/21/2024       Last Cardiology Tests:  ECG:  ECG 12 lead (Clinic Performed) 10/06/2023  Today.  Appropriate pacing.  Right axis deviation.  Corrected QT interval 450 ms    Device check today.  [employer] 2272 pacemaker.  Estimated longevity device over 4 years 10 months.  0 VT.  Less than 1% A-fib.  87% atrial pacing.  99.5% ventricular pacing.  Echo:  Transthoracic Echo (TTE) Complete 01/30/2024      Ejection Fractions:  EF   Date/Time Value Ref Range Status   01/30/2024 02:22 PM 60 %        Stress Test:  Nuclear Stress Test 01/31/2024      Cardiac Imaging:  No results found for this or any previous visit from the past 1095 days.        Lab review: I have personally reviewed the laboratory result(s) see above    Assessment/Plan   Problem List Items Addressed This Visit       AF (atrial fibrillation) (Multi)    Atrioventricular block, second degree    Benign essential hypertension    Cardiac pacemaker in situ    Mixed hyperlipidemia    Sleep apnea    SSS (sick sinus syndrome) (Multi)    Ventricular tachycardia (Multi)    Type 2 diabetes mellitus without retinopathy (Multi)    CAD S/P percutaneous coronary angioplasty    BMI 45.0-49.9, adult (Multi)    Former cigarette smoker       Sinus node dysfunction.  Chronotropic incompetence.  Syncope.  Syncope resolved after pacemaker implantation  [employer] pacemaker.   Normal device function.  Reviewed device check in detail with patient.  Ordered device checks.  Will alternate between remote checks and device clinic paired with EP office visit.  Hypertension.  Chronic.  Stable.  Reviewed medications.  Discussed refills.  Hyperglycemia.  Reviewed last blood work.  Hypothyroidism.  Reviewed last blood work.  Obstructive sleep apnea on CPAP.  Recommend further follow-up with PCP and  pulmonary.  Overweight  American heart Association recommendations and lifestyle modifications reviewed    Counseling greater than 50% of the visit performed.  The patient and I discussed normal conduction, arrhythmia, atrial fibrillation, pacemaker, ventricular lead noise, percent pacing, estimated longevity of device, treatment options, risk, benefits, imponderables.  All questions answered.  Patient appreciative care.     Glycopyrrolate Counseling:  I discussed with the patient the risks of glycopyrrolate including but not limited to skin rash, drowsiness, dry mouth, difficulty urinating, and blurred vision.

## 2024-10-08 NOTE — PATIENT INSTRUCTIONS
6 and 12 month visit  3,9 month remote 6 and 12 month clinic  Continue same medications and treatments.   Patient educated on proper medication use.   Patient educated on risk factor modification.   Please bring any lab results from other providers / physicians to your next appointment.     Please bring all medicines, vitamins, and herbal supplements with you when you come to the office.     Prescriptions will not be filled unless you are compliant with your follow up appointments or have a follow up appointment scheduled as per instruction of your physician. Refills should be requested at the time of your visit.

## 2024-10-09 ENCOUNTER — HOSPITAL ENCOUNTER (OUTPATIENT)
Dept: RADIOLOGY | Facility: HOSPITAL | Age: 79
Discharge: HOME | End: 2024-10-09
Payer: MEDICARE

## 2024-10-09 ENCOUNTER — LAB (OUTPATIENT)
Dept: LAB | Facility: LAB | Age: 79
End: 2024-10-09
Payer: MEDICARE

## 2024-10-09 DIAGNOSIS — N39.0 URINARY TRACT INFECTION, SITE NOT SPECIFIED: ICD-10-CM

## 2024-10-09 DIAGNOSIS — N35.911 UNSPECIFIED URETHRAL STRICTURE, MALE, MEATAL: ICD-10-CM

## 2024-10-09 DIAGNOSIS — N30.40 IRRADIATION CYSTITIS WITHOUT HEMATURIA: ICD-10-CM

## 2024-10-09 DIAGNOSIS — C61 MALIGNANT NEOPLASM OF PROSTATE (MULTI): ICD-10-CM

## 2024-10-09 DIAGNOSIS — M31.8 OTHER SPECIFIED NECROTIZING VASCULOPATHIES: ICD-10-CM

## 2024-10-09 DIAGNOSIS — Z87.898 PERSONAL HISTORY OF OTHER SPECIFIED CONDITIONS: ICD-10-CM

## 2024-10-09 DIAGNOSIS — N39.41 URGE INCONTINENCE: ICD-10-CM

## 2024-10-09 DIAGNOSIS — Z87.898 PERSONAL HISTORY OF OTHER SPECIFIED CONDITIONS: Primary | ICD-10-CM

## 2024-10-09 DIAGNOSIS — N31.8 OTHER NEUROMUSCULAR DYSFUNCTION OF BLADDER: ICD-10-CM

## 2024-10-09 LAB
ANION GAP SERPL CALC-SCNC: 10 MMOL/L (ref 10–20)
APPEARANCE UR: CLEAR
BILIRUB UR STRIP.AUTO-MCNC: NEGATIVE MG/DL
BUN SERPL-MCNC: 15 MG/DL (ref 6–23)
CALCIUM SERPL-MCNC: 9.2 MG/DL (ref 8.6–10.3)
CHLORIDE SERPL-SCNC: 103 MMOL/L (ref 98–107)
CO2 SERPL-SCNC: 29 MMOL/L (ref 21–32)
COLOR UR: COLORLESS
CREAT SERPL-MCNC: 1.27 MG/DL (ref 0.5–1.3)
EGFRCR SERPLBLD CKD-EPI 2021: 58 ML/MIN/1.73M*2
ERYTHROCYTE [DISTWIDTH] IN BLOOD BY AUTOMATED COUNT: 13.2 % (ref 11.5–14.5)
GLUCOSE SERPL-MCNC: 97 MG/DL (ref 74–99)
GLUCOSE UR STRIP.AUTO-MCNC: NORMAL MG/DL
HCT VFR BLD AUTO: 43.8 % (ref 41–52)
HGB BLD-MCNC: 14.4 G/DL (ref 13.5–17.5)
KETONES UR STRIP.AUTO-MCNC: NEGATIVE MG/DL
LEUKOCYTE ESTERASE UR QL STRIP.AUTO: NEGATIVE
MCH RBC QN AUTO: 29.2 PG (ref 26–34)
MCHC RBC AUTO-ENTMCNC: 32.9 G/DL (ref 32–36)
MCV RBC AUTO: 89 FL (ref 80–100)
NITRITE UR QL STRIP.AUTO: NEGATIVE
NRBC BLD-RTO: 0 /100 WBCS (ref 0–0)
PH UR STRIP.AUTO: 6.5 [PH]
PLATELET # BLD AUTO: 219 X10*3/UL (ref 150–450)
POTASSIUM SERPL-SCNC: 4.4 MMOL/L (ref 3.5–5.3)
PROT UR STRIP.AUTO-MCNC: NEGATIVE MG/DL
RBC # BLD AUTO: 4.93 X10*6/UL (ref 4.5–5.9)
RBC # UR STRIP.AUTO: NEGATIVE /UL
SODIUM SERPL-SCNC: 138 MMOL/L (ref 136–145)
SP GR UR STRIP.AUTO: 1.01
UROBILINOGEN UR STRIP.AUTO-MCNC: NORMAL MG/DL
WBC # BLD AUTO: 7.3 X10*3/UL (ref 4.4–11.3)

## 2024-10-09 PROCEDURE — 84153 ASSAY OF PSA TOTAL: CPT

## 2024-10-09 PROCEDURE — 36415 COLL VENOUS BLD VENIPUNCTURE: CPT

## 2024-10-09 PROCEDURE — 84154 ASSAY OF PSA FREE: CPT

## 2024-10-09 PROCEDURE — 81003 URINALYSIS AUTO W/O SCOPE: CPT

## 2024-10-09 PROCEDURE — 87086 URINE CULTURE/COLONY COUNT: CPT

## 2024-10-09 PROCEDURE — 80048 BASIC METABOLIC PNL TOTAL CA: CPT

## 2024-10-09 PROCEDURE — 76770 US EXAM ABDO BACK WALL COMP: CPT | Performed by: RADIOLOGY

## 2024-10-09 PROCEDURE — 85027 COMPLETE CBC AUTOMATED: CPT

## 2024-10-09 PROCEDURE — 76770 US EXAM ABDO BACK WALL COMP: CPT

## 2024-10-11 LAB
BACTERIA UR CULT: NORMAL
PSA FREE MFR SERPL: <33 %
PSA FREE SERPL-MCNC: <0.1 NG/ML
PSA SERPL IA-MCNC: 0.3 NG/ML (ref 0–4)

## 2024-10-15 NOTE — PREPROCEDURE INSTRUCTIONS

## 2024-10-22 ENCOUNTER — ANESTHESIA EVENT (OUTPATIENT)
Dept: OPERATING ROOM | Facility: HOSPITAL | Age: 79
End: 2024-10-22
Payer: MEDICARE

## 2024-10-22 NOTE — H&P
Urology H&P for short stay admission to be performed Wednesday, 10/23/2024 as follows    79-year-old white male  Remote history prostate cancer treated with brachytherapy and Lupron about 4 to 5 years ago  Prior history of gross hematuria secondary to UTI with reactive hemorrhagic cystitis and radiation cystitis  Remote history urethral stricture  Recently placed on Flomax and now returning for a 6-month check with lab work, ultrasound, cystoscopy dilation under MAC IV sedation as an outpatient        Social History:  He reports that he has quit smoking. His smoking use included cigarettes. He has never used smokeless tobacco. He reports that he does not currently use alcohol. He reports that he does not use drugs.     Family History:         Family History   Problem Relation Name Age of Onset    Other (acute myocardial infarction) Mother        Coronary artery disease Mother        Diabetes Mother        Other (ptca) Mother        Coronary artery disease Father        Other (cva) Sister             Allergies:  Barium iodide and Pine tar     Outpatient Medications:       Current Outpatient Medications   Medication Instructions    amLODIPine (Norvasc) 5 mg tablet 1 tablet, oral, Daily    aspirin 81 mg chewable tablet 1 tablet, oral, Daily    cholecalciferol (Vitamin D-3) 125 MCG (5000 UT) capsule Take 1 capsule (125 mcg) by mouth once daily.    cyanocobalamin, vitamin B-12, 2,500 mcg tablet,chewable Chew 2,500 mcg once daily.    levothyroxine (Synthroid, Levoxyl) 50 mcg tablet 1 tablet, oral, Daily    levothyroxine (SYNTHROID, LEVOXYL) 25 mcg, oral, 3 times weekly    losartan (COZAAR) 50 mg, oral, 2 times daily    metoprolol tartrate (LOPRESSOR) 50 mg, oral, 2 times daily    nitroglycerin (NITROSTAT) 0.4 mg, sublingual, Every 5 min PRN, place 1 tablet under tongue every 5 minutes for up to 3 doses as needed for chest pain. Call 911 if pain persists    pravastatin (PRAVACHOL) 40 mg, oral, Daily    tamsulosin (Flomax)  0.4 mg 24 hr capsule 1 capsule, oral, Daily    ubidecarenone (ULTRA COQ10 ORAL) 100 mg, oral, Once   Review of Systems   Constitutional: Negative.   Cardiovascular: Negative.    Respiratory: Negative.     Musculoskeletal: Negative.    Neurological: Negative.    All other systems reviewed and are negative.  Thyroid medication was adjusted per patient report.           Physical Exam  Cardiovascular:      Rate and Rhythm: Normal rate.      Pulses: Normal pulses.      Heart sounds: Normal heart sounds.      Comments: Left side device  Pulmonary:      Effort: Pulmonary effort is normal.   Musculoskeletal:      Right lower le+ Edema present.      Left lower le+ Edema present.   Neurological:      General: No focal deficit present.      Mental Status: He is alert.       Remote history prostate cancer, brachytherapy and Lupron, radiation cystitis and gross hematuria, urethral stricture  Now admitted for cystoscopy with urethral calibration and dilation under MAC IV sedation  The considered procedures along with alternatives, complications, risks, benefits, short and long-term followup were reviewed in detail and patient indicates understanding and is agreeable to proceed and followup as recommended.

## 2024-10-23 ENCOUNTER — ANESTHESIA (OUTPATIENT)
Dept: OPERATING ROOM | Facility: HOSPITAL | Age: 79
End: 2024-10-23
Payer: MEDICARE

## 2024-10-23 ENCOUNTER — HOSPITAL ENCOUNTER (OUTPATIENT)
Facility: HOSPITAL | Age: 79
Setting detail: OUTPATIENT SURGERY
Discharge: HOME | End: 2024-10-23
Attending: UROLOGY | Admitting: UROLOGY
Payer: MEDICARE

## 2024-10-23 VITALS
HEIGHT: 64 IN | BODY MASS INDEX: 45.58 KG/M2 | HEART RATE: 61 BPM | SYSTOLIC BLOOD PRESSURE: 168 MMHG | OXYGEN SATURATION: 96 % | DIASTOLIC BLOOD PRESSURE: 79 MMHG | TEMPERATURE: 96.8 F | RESPIRATION RATE: 18 BRPM | WEIGHT: 266.98 LBS

## 2024-10-23 DIAGNOSIS — C61 MALIGNANT NEOPLASM OF PROSTATE (MULTI): ICD-10-CM

## 2024-10-23 DIAGNOSIS — R39.198 SLOW URINARY STREAM: ICD-10-CM

## 2024-10-23 DIAGNOSIS — R39.11 URINARY HESITANCY: ICD-10-CM

## 2024-10-23 DIAGNOSIS — N40.1 BPH WITH OBSTRUCTION/LOWER URINARY TRACT SYMPTOMS: ICD-10-CM

## 2024-10-23 DIAGNOSIS — R35.0 URINARY FREQUENCY: ICD-10-CM

## 2024-10-23 DIAGNOSIS — N39.41 URGENCY INCONTINENCE: ICD-10-CM

## 2024-10-23 DIAGNOSIS — N40.0 ENLARGED PROSTATE WITHOUT LOWER URINARY TRACT SYMPTOMS (LUTS): Primary | ICD-10-CM

## 2024-10-23 DIAGNOSIS — N13.8 BPH WITH OBSTRUCTION/LOWER URINARY TRACT SYMPTOMS: ICD-10-CM

## 2024-10-23 PROBLEM — G47.33 OSA (OBSTRUCTIVE SLEEP APNEA): Status: ACTIVE | Noted: 2023-08-29

## 2024-10-23 PROBLEM — N35.913 STRICTURE OF MEMBRANOUS URETHRA IN MALE: Status: ACTIVE | Noted: 2024-10-23

## 2024-10-23 LAB
GLUCOSE BLD MANUAL STRIP-MCNC: 107 MG/DL (ref 74–99)
GLUCOSE BLD MANUAL STRIP-MCNC: 118 MG/DL (ref 74–99)

## 2024-10-23 PROCEDURE — 7100000002 HC RECOVERY ROOM TIME - EACH INCREMENTAL 1 MINUTE: Performed by: UROLOGY

## 2024-10-23 PROCEDURE — 2500000004 HC RX 250 GENERAL PHARMACY W/ HCPCS (ALT 636 FOR OP/ED): Performed by: STUDENT IN AN ORGANIZED HEALTH CARE EDUCATION/TRAINING PROGRAM

## 2024-10-23 PROCEDURE — 2500000005 HC RX 250 GENERAL PHARMACY W/O HCPCS: Performed by: UROLOGY

## 2024-10-23 PROCEDURE — 3700000002 HC GENERAL ANESTHESIA TIME - EACH INCREMENTAL 1 MINUTE: Performed by: UROLOGY

## 2024-10-23 PROCEDURE — 7100000010 HC PHASE TWO TIME - EACH INCREMENTAL 1 MINUTE: Performed by: UROLOGY

## 2024-10-23 PROCEDURE — C1769 GUIDE WIRE: HCPCS | Performed by: UROLOGY

## 2024-10-23 PROCEDURE — 3700000001 HC GENERAL ANESTHESIA TIME - INITIAL BASE CHARGE: Performed by: UROLOGY

## 2024-10-23 PROCEDURE — 7100000009 HC PHASE TWO TIME - INITIAL BASE CHARGE: Performed by: UROLOGY

## 2024-10-23 PROCEDURE — 3600000003 HC OR TIME - INITIAL BASE CHARGE - PROCEDURE LEVEL THREE: Performed by: UROLOGY

## 2024-10-23 PROCEDURE — 2720000007 HC OR 272 NO HCPCS: Performed by: UROLOGY

## 2024-10-23 PROCEDURE — 7100000001 HC RECOVERY ROOM TIME - INITIAL BASE CHARGE: Performed by: UROLOGY

## 2024-10-23 PROCEDURE — 2500000004 HC RX 250 GENERAL PHARMACY W/ HCPCS (ALT 636 FOR OP/ED): Performed by: UROLOGY

## 2024-10-23 PROCEDURE — 3600000008 HC OR TIME - EACH INCREMENTAL 1 MINUTE - PROCEDURE LEVEL THREE: Performed by: UROLOGY

## 2024-10-23 PROCEDURE — 82947 ASSAY GLUCOSE BLOOD QUANT: CPT

## 2024-10-23 RX ORDER — ONDANSETRON HYDROCHLORIDE 2 MG/ML
4 INJECTION, SOLUTION INTRAVENOUS ONCE AS NEEDED
Status: DISCONTINUED | OUTPATIENT
Start: 2024-10-23 | End: 2024-10-23 | Stop reason: HOSPADM

## 2024-10-23 RX ORDER — ACETAMINOPHEN 325 MG/1
650 TABLET ORAL EVERY 4 HOURS PRN
Status: DISCONTINUED | OUTPATIENT
Start: 2024-10-23 | End: 2024-10-23 | Stop reason: HOSPADM

## 2024-10-23 RX ORDER — FENTANYL CITRATE 50 UG/ML
25 INJECTION, SOLUTION INTRAMUSCULAR; INTRAVENOUS EVERY 5 MIN PRN
Status: DISCONTINUED | OUTPATIENT
Start: 2024-10-23 | End: 2024-10-23 | Stop reason: HOSPADM

## 2024-10-23 RX ORDER — SODIUM CHLORIDE, SODIUM LACTATE, POTASSIUM CHLORIDE, CALCIUM CHLORIDE 600; 310; 30; 20 MG/100ML; MG/100ML; MG/100ML; MG/100ML
75 INJECTION, SOLUTION INTRAVENOUS CONTINUOUS
Status: DISCONTINUED | OUTPATIENT
Start: 2024-10-23 | End: 2024-10-23 | Stop reason: HOSPADM

## 2024-10-23 RX ORDER — LIDOCAINE HYDROCHLORIDE 10 MG/ML
0.1 INJECTION, SOLUTION EPIDURAL; INFILTRATION; INTRACAUDAL; PERINEURAL ONCE
Status: DISCONTINUED | OUTPATIENT
Start: 2024-10-23 | End: 2024-10-23 | Stop reason: HOSPADM

## 2024-10-23 RX ORDER — CIPROFLOXACIN 2 MG/ML
400 INJECTION, SOLUTION INTRAVENOUS ONCE
Status: COMPLETED | OUTPATIENT
Start: 2024-10-23 | End: 2024-10-23

## 2024-10-23 RX ORDER — WATER 1 ML/ML
IRRIGANT IRRIGATION AS NEEDED
Status: DISCONTINUED | OUTPATIENT
Start: 2024-10-23 | End: 2024-10-23 | Stop reason: HOSPADM

## 2024-10-23 RX ORDER — LABETALOL HYDROCHLORIDE 5 MG/ML
5 INJECTION, SOLUTION INTRAVENOUS ONCE AS NEEDED
Status: DISCONTINUED | OUTPATIENT
Start: 2024-10-23 | End: 2024-10-23 | Stop reason: HOSPADM

## 2024-10-23 RX ORDER — LIDOCAINE HYDROCHLORIDE 20 MG/ML
JELLY TOPICAL AS NEEDED
Status: DISCONTINUED | OUTPATIENT
Start: 2024-10-23 | End: 2024-10-23 | Stop reason: HOSPADM

## 2024-10-23 RX ORDER — MIDAZOLAM HYDROCHLORIDE 1 MG/ML
INJECTION, SOLUTION INTRAMUSCULAR; INTRAVENOUS AS NEEDED
Status: DISCONTINUED | OUTPATIENT
Start: 2024-10-23 | End: 2024-10-23

## 2024-10-23 RX ORDER — PROPOFOL 10 MG/ML
INJECTION, EMULSION INTRAVENOUS AS NEEDED
Status: DISCONTINUED | OUTPATIENT
Start: 2024-10-23 | End: 2024-10-23

## 2024-10-23 RX ORDER — SODIUM CHLORIDE, SODIUM LACTATE, POTASSIUM CHLORIDE, CALCIUM CHLORIDE 600; 310; 30; 20 MG/100ML; MG/100ML; MG/100ML; MG/100ML
100 INJECTION, SOLUTION INTRAVENOUS CONTINUOUS
Status: DISCONTINUED | OUTPATIENT
Start: 2024-10-23 | End: 2024-10-23 | Stop reason: HOSPADM

## 2024-10-23 SDOH — HEALTH STABILITY: MENTAL HEALTH: CURRENT SMOKER: 0

## 2024-10-23 ASSESSMENT — PAIN - FUNCTIONAL ASSESSMENT
PAIN_FUNCTIONAL_ASSESSMENT: 0-10
PAIN_FUNCTIONAL_ASSESSMENT: UNABLE TO SELF-REPORT
PAIN_FUNCTIONAL_ASSESSMENT: 0-10

## 2024-10-23 ASSESSMENT — PAIN DESCRIPTION - DESCRIPTORS: DESCRIPTORS: SORE

## 2024-10-23 ASSESSMENT — COLUMBIA-SUICIDE SEVERITY RATING SCALE - C-SSRS
6. HAVE YOU EVER DONE ANYTHING, STARTED TO DO ANYTHING, OR PREPARED TO DO ANYTHING TO END YOUR LIFE?: NO
2. HAVE YOU ACTUALLY HAD ANY THOUGHTS OF KILLING YOURSELF?: NO
1. IN THE PAST MONTH, HAVE YOU WISHED YOU WERE DEAD OR WISHED YOU COULD GO TO SLEEP AND NOT WAKE UP?: NO

## 2024-10-23 ASSESSMENT — PAIN SCALES - GENERAL
PAINLEVEL_OUTOF10: 2
PAIN_LEVEL: 0
PAINLEVEL_OUTOF10: 1
PAINLEVEL_OUTOF10: 1

## 2024-10-23 NOTE — ANESTHESIA PREPROCEDURE EVALUATION
Patient: Sid Farmer    Procedure Information       Date/Time: 10/23/24 1400    Procedure: Cystoscopy with Dilatation - cystoscopy with dilation 55934 under MAC sedation., DIABETIC - IODINE ALLERGY - HAS PACEMAKER    Location: [facility] / [facility]    Surgeons: Louis D'Amico, MD            Relevant Problems   Cardiac   (+) AF (atrial fibrillation) (Multi)   (+) Angina, class IV (CMS-HCC)   (+) Atrioventricular block, second degree   (+) Benign essential hypertension   (+) Cardiac pacemaker in situ   (+) Mixed hyperlipidemia   (+) SSS (sick sinus syndrome) (Multi)   (+) Ventricular tachycardia (Multi)      Pulmonary   (+) GHAZAL (obstructive sleep apnea)      /Renal   (+) BPH with obstruction/lower urinary tract symptoms   (+) Malignant neoplasm of prostate (Multi)   (+) Prostate cancer (Multi)      Endocrine   (+) 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)   (+) Hypothyroidism   (+) Morbid obesity (Multi)   (+) Stage 2 chronic kidney disease due to type 2 diabetes mellitus (Multi)   (+) Type 2 diabetes mellitus without retinopathy (Multi)      Skin   (+) Eczematous dermatitis      Respiratory   (+) Dyspnea      Circulatory   (+) Chronic venous insufficiency      Digestive   (+) History of gastroesophageal reflux (GERD)      Genitourinary   (+) Chronic kidney disease due to hypertension   (+) Stage 3 chronic kidney disease (Multi)       Clinical information reviewed:   Tobacco  Allergies  Meds   Med Hx  Surg Hx   Fam Hx  Soc Hx        NPO Detail:  NPO/Void Status  Carbohydrate Drink Given Prior to Surgery? : N  Date of Last Liquid: 10/22/24  Time of Last Liquid: 2100  Date of Last Solid: 10/22/24  Time of Last Solid: 1800  Last Intake Type: Clear fluids  Time of Last Void: 1337         Physical Exam    Airway  Mallampati: III  TM distance: >3 FB  Neck ROM: full     Cardiovascular   Rhythm: regular  Rate: normal     Dental - normal exam     Pulmonary - normal exam     Abdominal   (+) obese            Anesthesia Plan    History of general anesthesia?: yes  History of complications of general anesthesia?: no    ASA 3     MAC     The patient is not a current smoker.    intravenous induction   Anesthetic plan and risks discussed with patient.  Use of blood products discussed with patient who consented to blood products.    Plan discussed with attending.

## 2024-10-23 NOTE — ANESTHESIA POSTPROCEDURE EVALUATION
Patient: Sid Farmer    Procedure Summary       Date: 10/23/24 Room / Location: [facility]    Anesthesia Start: 1424 Anesthesia Stop: 1502    Procedure: Cystoscopy with Dilatation Diagnosis:       Stricture of urethral meatus in male, unspecified stricture type      Urinary tract infection with hematuria, site unspecified      Malignant neoplasm of prostate (Multi)      (stricture, UTI, prostate cancer)    Surgeons: Louis D'Amico, MD Responsible Provider: Venecia Matamoros MD    Anesthesia Type: MAC ASA Status: 3            Anesthesia Type: MAC    Vitals Value Taken Time   BP 96/56 10/23/24 1505   Temp 36.0 10/23/24 1505   Pulse 60 10/23/24 1505   Resp 12 10/23/24 1505   SpO2 90 10/23/24 1505       Anesthesia Post Evaluation    Patient location during evaluation: bedside  Patient participation: complete - patient participated  Level of consciousness: sleepy but conscious  Pain score: 0  Pain management: adequate  Airway patency: positional obstruction  Two or more strategies used to mitigate risk of obstructive sleep apnea  Cardiovascular status: acceptable  Respiratory status: acceptable, face mask and oral airway  Hydration status: acceptable  Postoperative Nausea and Vomiting: none        No notable events documented.

## 2024-10-23 NOTE — DISCHARGE INSTRUCTIONS
Moderate Sedation in Adults Discharge Instructions    About this topic  Moderate sedation is also known as conscious sedation. It changes your state of being awake or consciousness. With this sedation, you may feel slight pain or pressure during a procedure. The drugs help you to relax and may even allow you to sleep. It will be easy to wake you and you may talk and answer questions while under sedation. Most likely, you will not remember what happens while under this sedation.  What care is needed at home?  Ask your doctor what you need to do when you go home. Make sure you understand everything the doctor says. This way you will know what you need to do.  You will not be allowed to drive right away after the procedure. Ask a family member or a friend to drive you home.  Do not operate heavy or dangerous machinery for at least 24 hours.  Do not make major decisions or sign important papers for at least 24 hours. You may not be thinking clearly.  Avoid beer, wine, or mixed drinks (alcohol) for at least 24 hours.  You are at a higher risk of falling for at least 24 hours after moderate sedation.  Take extra care when you get up.  Do not change positions quickly.  Do not rush when you need to go to the bathroom or to answer the phone.  Ask for help if you feel unsteady when you try to walk.  Wear shoes with non-slip soles and low heels.  What follow-up care is needed?  Your doctor may ask you to make visits to the office to check on your progress. Be sure to keep these visits. Your doctor may also refer you to other doctors or tell you that you need more tests or care.  What drugs may be needed?  The doctor may order drugs to:  Help with pain  Treat an upset stomach or throwing up  Will physical activity be limited?  Rest for the day of the procedure. Avoid strenuous activities like heavy lifting and hard exercise. Talk to your doctor about whether you need to limit lifting or exercise after your procedure.  What  changes to diet are needed?  Start with a light diet when you are fully awake. This includes things that are easy to swallow like soups, pudding, jello, toast, and eggs. Slowly progress to your normal diet.  What problems could happen?  Low blood pressure  Breathing problems  Upset stomach or throwing up  Dizziness  When do I need to call the doctor?  Feel dizzy, weak, or tired  Faint  Very bad headache  Upset stomach or throwing up  To follow up for more tests or care  Teach Back: Helping You Understand  The Teach Back Method helps you understand the information we are giving you. After you talk with the staff, tell them in your own words what you learned. This helps to make sure the staff has described each thing clearly. It also helps to explain things that may have been confusing. Before going home, make sure you can do these:  I can tell you about my procedure.  I can tell you if I need more tests or care.  I can tell you what is good for me to eat and drink the next day.  I can tell you what I would do if I feel dizzy, weak, or tired.  Last Reviewed Date  2020-03-02

## 2024-10-23 NOTE — INTERVAL H&P NOTE
H&P reviewed. The patient was examined and there are no changes to the H&P.    On today's visit the patient reports that he has variable force to his urinary flow sometimes very good sometimes poor but has not been consistently poor or weak, urine is clear no dysuria no signs of infection

## 2024-10-23 NOTE — OP NOTE
UROLOGY POSTOP NOTE FOR WEDNESDAY, 10/23/2024    PREOPERATIVE DIAGNOSIS: [] Recurrent urethral stricture, slow urinary flow, urinary hesitancy, urinary urgency frequency, remote history prostate cancer    POSTOPERATIVE DIAGNOSIS: [] Same as preop    OPERATIVE PROCEDURE: [] Cystourethroscopy with urethral calibration and dilation, complex over-the-wire dilation of membranous urethral stricture and insertion Farias catheter    ANESTHESIA: [] Parenteral MAC IV sedation along with 20 cc of 2% plain lidocaine jelly per urethra    ASSISTANTS: [] None    IV FLUIDS: [] As per record    ESTIMATED BLOOD LOSS: [] None    PROCEDURE AS FOLLOWS: []    AN APPROPRIATE HUDDLE AND TIMEOUT PROCEDURE WAS APPROPRIATELY CARRIED OUT BEFORE AND AFTER ANESTHESIA WAS ADMINISTERED IN SATISFACTORY FASHION WITH SURGEON PATIENT AND ALL OPERATING ROOM PERSONNEL PRESENT ACCORDING TO ROUTINE POLICY.    After administration of satisfactory parenteral MAC IV sedation, the patient was placed in lithotomy position prepped and draped per routine and 20 cc of 2% plain lidocaine jelly administered per urethra for additional local anesthesia.    Initially cystourethroscopy was carried out with the 22.5 Mongolian rigid Olympus cystoscopic unit  The distal urethra is unremarkable  The membranous urethra shows a very tiny almost pinpoint recurrent urethral stricture.  A 0.35 Glidewire was then passed through the scope into the stricture and passed completely into the bladder removing the scope and leaving just a small amount exiting from the urethra.    Complex over-the-wire dilation was then carried out utilizing progressive blue Cook over-the-wire dilators beginning at 12 Mongolian and going through 24 Mongolian.  There was still some resistance and [place] sounds were then used to complete the dilation through 28 Mongolian.    All instruments were removed and a 16 Mongolian Farias catheter left indwelling for short period following the procedure  Patient tolerated  procedure well and is returned to the recovery area in satisfactory condition  Operative findings and follow-up were reviewed with Gonzalo patient's brother at cell phone [phone]    Follow-up    Tentatively plan repeat procedure in about 6 months unless patient calls with recurrent obstructive symptoms sooner or earlier

## 2024-11-09 ENCOUNTER — HOSPITAL ENCOUNTER (EMERGENCY)
Facility: HOSPITAL | Age: 79
Discharge: HOME | End: 2024-11-09
Payer: MEDICARE

## 2024-11-09 ENCOUNTER — APPOINTMENT (OUTPATIENT)
Dept: RADIOLOGY | Facility: HOSPITAL | Age: 79
End: 2024-11-09
Payer: MEDICARE

## 2024-11-09 VITALS
TEMPERATURE: 96.8 F | DIASTOLIC BLOOD PRESSURE: 74 MMHG | RESPIRATION RATE: 20 BRPM | HEIGHT: 64 IN | WEIGHT: 265 LBS | OXYGEN SATURATION: 95 % | HEART RATE: 74 BPM | BODY MASS INDEX: 45.24 KG/M2 | SYSTOLIC BLOOD PRESSURE: 170 MMHG

## 2024-11-09 DIAGNOSIS — R31.9 HEMATURIA, UNSPECIFIED TYPE: Primary | ICD-10-CM

## 2024-11-09 LAB
ALBUMIN SERPL BCP-MCNC: 4 G/DL (ref 3.4–5)
ALP SERPL-CCNC: 50 U/L (ref 33–136)
ALT SERPL W P-5'-P-CCNC: 19 U/L (ref 10–52)
ANION GAP SERPL CALC-SCNC: 11 MMOL/L (ref 10–20)
APPEARANCE UR: CLEAR
AST SERPL W P-5'-P-CCNC: 17 U/L (ref 9–39)
BASOPHILS # BLD AUTO: 0.12 X10*3/UL (ref 0–0.1)
BASOPHILS NFR BLD AUTO: 1.9 %
BILIRUB SERPL-MCNC: 0.6 MG/DL (ref 0–1.2)
BILIRUB UR STRIP.AUTO-MCNC: NEGATIVE MG/DL
BUN SERPL-MCNC: 15 MG/DL (ref 6–23)
CALCIUM SERPL-MCNC: 8.6 MG/DL (ref 8.6–10.3)
CHLORIDE SERPL-SCNC: 108 MMOL/L (ref 98–107)
CO2 SERPL-SCNC: 24 MMOL/L (ref 21–32)
COLOR UR: ABNORMAL
CREAT SERPL-MCNC: 1.22 MG/DL (ref 0.5–1.3)
EGFRCR SERPLBLD CKD-EPI 2021: 61 ML/MIN/1.73M*2
EOSINOPHIL # BLD AUTO: 0.25 X10*3/UL (ref 0–0.4)
EOSINOPHIL NFR BLD AUTO: 4 %
ERYTHROCYTE [DISTWIDTH] IN BLOOD BY AUTOMATED COUNT: 13.3 % (ref 11.5–14.5)
GLUCOSE SERPL-MCNC: 124 MG/DL (ref 74–99)
GLUCOSE UR STRIP.AUTO-MCNC: NORMAL MG/DL
HCT VFR BLD AUTO: 40.6 % (ref 41–52)
HGB BLD-MCNC: 13.5 G/DL (ref 13.5–17.5)
HOLD SPECIMEN: NORMAL
IMM GRANULOCYTES # BLD AUTO: 0.07 X10*3/UL (ref 0–0.5)
IMM GRANULOCYTES NFR BLD AUTO: 1.1 % (ref 0–0.9)
KETONES UR STRIP.AUTO-MCNC: NEGATIVE MG/DL
LEUKOCYTE ESTERASE UR QL STRIP.AUTO: NEGATIVE
LIPASE SERPL-CCNC: 27 U/L (ref 9–82)
LYMPHOCYTES # BLD AUTO: 0.92 X10*3/UL (ref 0.8–3)
LYMPHOCYTES NFR BLD AUTO: 14.6 %
MCH RBC QN AUTO: 29.2 PG (ref 26–34)
MCHC RBC AUTO-ENTMCNC: 33.3 G/DL (ref 32–36)
MCV RBC AUTO: 88 FL (ref 80–100)
MONOCYTES # BLD AUTO: 0.59 X10*3/UL (ref 0.05–0.8)
MONOCYTES NFR BLD AUTO: 9.4 %
MUCOUS THREADS #/AREA URNS AUTO: ABNORMAL /LPF
NEUTROPHILS # BLD AUTO: 4.33 X10*3/UL (ref 1.6–5.5)
NEUTROPHILS NFR BLD AUTO: 69 %
NITRITE UR QL STRIP.AUTO: NEGATIVE
NRBC BLD-RTO: 0 /100 WBCS (ref 0–0)
PH UR STRIP.AUTO: 5.5 [PH]
PLATELET # BLD AUTO: 165 X10*3/UL (ref 150–450)
POTASSIUM SERPL-SCNC: 4.3 MMOL/L (ref 3.5–5.3)
PROT SERPL-MCNC: 6.3 G/DL (ref 6.4–8.2)
PROT UR STRIP.AUTO-MCNC: NEGATIVE MG/DL
RBC # BLD AUTO: 4.62 X10*6/UL (ref 4.5–5.9)
RBC # UR STRIP.AUTO: ABNORMAL /UL
RBC #/AREA URNS AUTO: ABNORMAL /HPF
SODIUM SERPL-SCNC: 139 MMOL/L (ref 136–145)
SP GR UR STRIP.AUTO: 1.02
UROBILINOGEN UR STRIP.AUTO-MCNC: NORMAL MG/DL
WBC # BLD AUTO: 6.3 X10*3/UL (ref 4.4–11.3)
WBC #/AREA URNS AUTO: ABNORMAL /HPF

## 2024-11-09 PROCEDURE — 80053 COMPREHEN METABOLIC PANEL: CPT | Performed by: PHYSICIAN ASSISTANT

## 2024-11-09 PROCEDURE — 81001 URINALYSIS AUTO W/SCOPE: CPT | Performed by: PHYSICIAN ASSISTANT

## 2024-11-09 PROCEDURE — 99284 EMERGENCY DEPT VISIT MOD MDM: CPT | Mod: 25

## 2024-11-09 PROCEDURE — 74176 CT ABD & PELVIS W/O CONTRAST: CPT

## 2024-11-09 PROCEDURE — 83690 ASSAY OF LIPASE: CPT | Performed by: PHYSICIAN ASSISTANT

## 2024-11-09 PROCEDURE — 74176 CT ABD & PELVIS W/O CONTRAST: CPT | Performed by: RADIOLOGY

## 2024-11-09 PROCEDURE — 85025 COMPLETE CBC W/AUTO DIFF WBC: CPT | Performed by: PHYSICIAN ASSISTANT

## 2024-11-09 PROCEDURE — 36415 COLL VENOUS BLD VENIPUNCTURE: CPT | Performed by: PHYSICIAN ASSISTANT

## 2024-11-09 ASSESSMENT — LIFESTYLE VARIABLES
EVER HAD A DRINK FIRST THING IN THE MORNING TO STEADY YOUR NERVES TO GET RID OF A HANGOVER: NO
HAVE PEOPLE ANNOYED YOU BY CRITICIZING YOUR DRINKING: NO
HAVE YOU EVER FELT YOU SHOULD CUT DOWN ON YOUR DRINKING: NO
TOTAL SCORE: 0
EVER FELT BAD OR GUILTY ABOUT YOUR DRINKING: NO

## 2024-11-09 ASSESSMENT — PAIN DESCRIPTION - PAIN TYPE: TYPE: SURGICAL PAIN

## 2024-11-09 ASSESSMENT — PAIN DESCRIPTION - DESCRIPTORS: DESCRIPTORS: SORE

## 2024-11-09 ASSESSMENT — COLUMBIA-SUICIDE SEVERITY RATING SCALE - C-SSRS
1. IN THE PAST MONTH, HAVE YOU WISHED YOU WERE DEAD OR WISHED YOU COULD GO TO SLEEP AND NOT WAKE UP?: NO
2. HAVE YOU ACTUALLY HAD ANY THOUGHTS OF KILLING YOURSELF?: NO
6. HAVE YOU EVER DONE ANYTHING, STARTED TO DO ANYTHING, OR PREPARED TO DO ANYTHING TO END YOUR LIFE?: NO

## 2024-11-09 ASSESSMENT — PAIN - FUNCTIONAL ASSESSMENT: PAIN_FUNCTIONAL_ASSESSMENT: 0-10

## 2024-11-09 ASSESSMENT — PAIN DESCRIPTION - LOCATION: LOCATION: GROIN

## 2024-11-09 NOTE — ED PROVIDER NOTES
HPI   Chief Complaint   Patient presents with    Blood in Urine     Patient states he's had bloody urine for a week on and off since his procedure.        79-year-old male with a history of hypertension, hypothyroidism, previous prostate cancer, CKD, BPH, A-fib not on any current blood thinners presenting to the ER today with intermittent blood in his urine.  Patient tells me he had a cystoscopy on 10/23.  He states he was discharged home and has not had any difficulty urinating or pain with urination but when he gets up to go to the bathroom overnight and with his first morning urination he has occasionally seeing some blood in the urine.  He states then throughout the day his urine will become more normal in color.  He has not had follow-up with his urologist yet.  He states that he does have some discomfort over his bladder and an occasional pain in the right side of his abdomen.  He denies fevers chills or bodyaches and denies nausea, vomiting or diarrhea.  He states otherwise he has been feeling well.  He noticed an odor coming from his urine and is concerned he may have an infection.  He has not been on any recent antibiotics.  No further complaints at this time.      History provided by:  Patient          Patient History   Past Medical History:   Diagnosis Date    2nd degree AV block     Anemia     Angina pectoris     Anxiety     Arthritis     Atrial arrhythmia     Atrial fibrillation (Multi)     Basal cell carcinoma     Bladder spasms     BPH (benign prostatic hyperplasia)     Cataract     CKD (chronic kidney disease)     Stage 2 d/t DM2    Constipation     Coronary artery disease     COVID-19     NOT VACCINATED    Cystitis     Depression     Diabetes mellitus (Multi)     no medication; elevation of HgA1C    Diverticulosis     Dizziness     GERD (gastroesophageal reflux disease)     History of morbid obesity     History of OCD (obsessive compulsive disorder)     Hyperlipidemia     Hypertension      Hypothyroidism     Joint pain     lower back    Nocturia     Prostate cancer (Multi)     Rectal pain     Second degree AV block     Shortness of breath     use of inhaler if needed    Sleep apnea     uses BiPAP    SSS (sick sinus syndrome) (Multi)     Syncope     Urgency incontinence     Urinary tract infection     Ventricular tachycardia (Multi)     Vertigo     Wears glasses      Past Surgical History:   Procedure Laterality Date    CARDIAC CATHETERIZATION      CARDIAC PACEMAKER PLACEMENT      CAROTID STENT      CATARACT EXTRACTION      BILATERAL    COLONOSCOPY      CORONARY ANGIOPLASTY      CORONARY STENT PLACEMENT      HERNIA REPAIR      INTRAOCULAR LENS INSERTION      OTHER SURGICAL HISTORY      YAG CAPSULOTOMY    PROSTATE BIOPSY      PROSTATE SURGERY      RETINAL DETACHMENT SURGERY       Family History   Problem Relation Name Age of Onset    Other (acute myocardial infarction) Mother      Coronary artery disease Mother      Diabetes Mother      Other (ptca) Mother      Coronary artery disease Father      Other (cva) Sister       Social History     Tobacco Use    Smoking status: Former     Types: Cigarettes    Smokeless tobacco: Never   Vaping Use    Vaping status: Never Used   Substance Use Topics    Alcohol use: Not Currently     Comment: rare, New Years    Drug use: Never       Physical Exam   ED Triage Vitals [11/09/24 1008]   Temperature Heart Rate Respirations BP   36 °C (96.8 °F) 74 20 170/74      Pulse Ox Temp Source Heart Rate Source Patient Position   95 % Temporal Monitor --      BP Location FiO2 (%)     -- --       Physical Exam  Constitutional:       General: He is not in acute distress.  Eyes:      Conjunctiva/sclera: Conjunctivae normal.   Cardiovascular:      Rate and Rhythm: Normal rate and regular rhythm.      Pulses: Normal pulses.      Heart sounds: Normal heart sounds.   Pulmonary:      Effort: Pulmonary effort is normal.      Breath sounds: Normal breath sounds.   Abdominal:      General:  Bowel sounds are normal. There is no distension.      Palpations: Abdomen is soft.      Tenderness: There is no right CVA tenderness, left CVA tenderness, guarding or rebound.      Comments: Mild tenderness in the suprapubic region without guarding or rebound.  No further abdominal tenderness.   Musculoskeletal:      Comments: Normal gait and strength tone.   Skin:     General: Skin is warm.   Neurological:      Mental Status: He is alert.           ED Course & MDM   Diagnoses as of 11/09/24 1209   Hematuria, unspecified type                 No data recorded     Stratton Coma Scale Score: 15 (11/09/24 1010 : Curtis Soliz, ZITA)                           Medical Decision Making  79-year-old male presenting to the ER today with intermittent hematuria.  Patient had a cystoscopy, urethral dilatation on 10/23.  He states that since then when he gets up to pee overnight or his first morning urination sometimes he notices some blood in it.  He is not having any rectal bleeding or bloody stools and he denies nausea or vomiting or diarrhea.  He has not had a fever.  Now over the past few days he has noticed an odor coming from his urine in the mornings as well and he is concerned he may have an infection.  He states otherwise he has been feeling well but he does have some pressure over his bladder and did have a discomfort in the right side of his abdomen.  Patient arrives afebrile with stable vital signs.  He is resting comfortably on exam and is nontoxic-appearing.  Heart RRR, lungs are clear and abdomen is soft and nondistended with normal bowel sounds.  He is mildly tender in the suprapubic region without guarding or rebound and he points to the right mid abdomen where he had some pain in the other day however this is nontender.  Exam is otherwise within normal limits.  Labs, urinalysis and CT are ordered.    CBC with stable H&H and no leukocytosis.  CMP without acute electrolyte abnormality or renal insufficiency.   Urinalysis with hematuria but no evidence of infection.  Labs at this time are otherwise within normal limits.  CT abdomen pelvis performed today demonstrates no acute abnormality.  On reassessment patient is resting comfortably and he has no signs of acute distress.  He has had this intermittent bleeding since his cystoscopy but there is no evidence of infection, blood counts are stable.  I discussed with the patient that he will need close follow-up with his urologist.  He is not having any difficulty urinating, he feels he is fully emptying his bladder.  I did discuss however warning signs to return to the ED and he expressed understanding and agreed with the plan of care today.      Labs Reviewed   URINALYSIS WITH REFLEX CULTURE AND MICROSCOPIC - Abnormal       Result Value    Color, Urine Light-Yellow      Appearance, Urine Clear      Specific Gravity, Urine 1.018      pH, Urine 5.5      Protein, Urine NEGATIVE      Glucose, Urine Normal      Blood, Urine 0.1 (1+) (*)     Ketones, Urine NEGATIVE      Bilirubin, Urine NEGATIVE      Urobilinogen, Urine Normal      Nitrite, Urine NEGATIVE      Leukocyte Esterase, Urine NEGATIVE     CBC WITH AUTO DIFFERENTIAL - Abnormal    WBC 6.3      nRBC 0.0      RBC 4.62      Hemoglobin 13.5      Hematocrit 40.6 (*)     MCV 88      MCH 29.2      MCHC 33.3      RDW 13.3      Platelets 165      Neutrophils % 69.0      Immature Granulocytes %, Automated 1.1 (*)     Lymphocytes % 14.6      Monocytes % 9.4      Eosinophils % 4.0      Basophils % 1.9      Neutrophils Absolute 4.33      Immature Granulocytes Absolute, Automated 0.07      Lymphocytes Absolute 0.92      Monocytes Absolute 0.59      Eosinophils Absolute 0.25      Basophils Absolute 0.12 (*)    COMPREHENSIVE METABOLIC PANEL - Abnormal    Glucose 124 (*)     Sodium 139      Potassium 4.3      Chloride 108 (*)     Bicarbonate 24      Anion Gap 11      Urea Nitrogen 15      Creatinine 1.22      eGFR 61      Calcium 8.6       Albumin 4.0      Alkaline Phosphatase 50      Total Protein 6.3 (*)     AST 17      Bilirubin, Total 0.6      ALT 19     URINALYSIS MICROSCOPIC WITH REFLEX CULTURE - Abnormal    WBC, Urine 1-5      RBC, Urine 11-20 (*)     Mucus, Urine FEW     LIPASE - Normal    Lipase 27      Narrative:     Venipuncture immediately after or during the administration of Metamizole may lead to falsely low results. Testing should be performed immediately prior to Metamizole dosing.   URINALYSIS WITH REFLEX CULTURE AND MICROSCOPIC    Narrative:     The following orders were created for panel order Urinalysis with Reflex Culture and Microscopic.  Procedure                               Abnormality         Status                     ---------                               -----------         ------                     Urinalysis with Reflex C...[[id number]]  Abnormal            Final result               Extra Urine Gray Tube[[id number]]                            In process                   Please view results for these tests on the individual orders.   EXTRA URINE GRAY TUBE       CT abdomen pelvis wo IV contrast   Final Result   1. No hydronephrosis, hydroureter or renal/ureteral calculus.   2. No evidence bowel obstruction, free intraperitoneal air or   abnormal intra-abdominal fluid collection.        MACRO:   None        Signed by: Dave Garcia 11/9/2024 11:48 AM   Dictation workstation:   [id number]            Procedure  Procedures     Annamaria Eisenberg PA-C  11/09/24 1210

## 2025-01-13 ENCOUNTER — HOSPITAL ENCOUNTER (OUTPATIENT)
Dept: CARDIOLOGY | Facility: HOSPITAL | Age: 80
Discharge: HOME | End: 2025-01-13
Payer: MEDICARE

## 2025-01-13 DIAGNOSIS — I49.5 SSS (SICK SINUS SYNDROME) (MULTI): ICD-10-CM

## 2025-01-13 DIAGNOSIS — Z95.0 CARDIAC PACEMAKER IN SITU: ICD-10-CM

## 2025-01-13 PROCEDURE — 93294 REM INTERROG EVL PM/LDLS PM: CPT | Performed by: INTERNAL MEDICINE

## 2025-01-13 PROCEDURE — 93296 REM INTERROG EVL PM/IDS: CPT

## 2025-04-07 NOTE — PREPROCEDURE INSTRUCTIONS

## 2025-04-11 ENCOUNTER — APPOINTMENT (OUTPATIENT)
Dept: LAB | Facility: HOSPITAL | Age: 80
End: 2025-04-11
Payer: MEDICARE

## 2025-04-11 DIAGNOSIS — N39.41 URGE INCONTINENCE: ICD-10-CM

## 2025-04-11 DIAGNOSIS — N35.911 UNSPECIFIED URETHRAL STRICTURE, MALE, MEATAL: ICD-10-CM

## 2025-04-11 DIAGNOSIS — R39.198 OTHER DIFFICULTIES WITH MICTURITION: ICD-10-CM

## 2025-04-11 DIAGNOSIS — C61 MALIGNANT NEOPLASM OF PROSTATE (MULTI): ICD-10-CM

## 2025-04-11 DIAGNOSIS — Z01.812 ENCOUNTER FOR PREPROCEDURAL LABORATORY EXAMINATION: Primary | ICD-10-CM

## 2025-04-11 DIAGNOSIS — N32.89 OTHER SPECIFIED DISORDERS OF BLADDER: ICD-10-CM

## 2025-04-11 DIAGNOSIS — R39.11 HESITANCY OF MICTURITION: ICD-10-CM

## 2025-04-11 DIAGNOSIS — Z87.898 PERSONAL HISTORY OF OTHER SPECIFIED CONDITIONS: ICD-10-CM

## 2025-04-11 LAB
ANION GAP SERPL CALC-SCNC: 14 MMOL/L (ref 10–20)
APPEARANCE UR: CLEAR
BILIRUB UR STRIP.AUTO-MCNC: NEGATIVE MG/DL
BUN SERPL-MCNC: 19 MG/DL (ref 6–23)
CALCIUM SERPL-MCNC: 9.4 MG/DL (ref 8.6–10.3)
CHLORIDE SERPL-SCNC: 103 MMOL/L (ref 98–107)
CO2 SERPL-SCNC: 25 MMOL/L (ref 21–32)
COLOR UR: NORMAL
CREAT SERPL-MCNC: 1.25 MG/DL (ref 0.5–1.3)
EGFRCR SERPLBLD CKD-EPI 2021: 59 ML/MIN/1.73M*2
ERYTHROCYTE [DISTWIDTH] IN BLOOD BY AUTOMATED COUNT: 12.6 % (ref 11.5–14.5)
GLUCOSE SERPL-MCNC: 119 MG/DL (ref 74–99)
GLUCOSE UR STRIP.AUTO-MCNC: NORMAL MG/DL
HCT VFR BLD AUTO: 43.4 % (ref 41–52)
HGB BLD-MCNC: 14.7 G/DL (ref 13.5–17.5)
KETONES UR STRIP.AUTO-MCNC: NEGATIVE MG/DL
LEUKOCYTE ESTERASE UR QL STRIP.AUTO: NEGATIVE
MCH RBC QN AUTO: 29.4 PG (ref 26–34)
MCHC RBC AUTO-ENTMCNC: 33.9 G/DL (ref 32–36)
MCV RBC AUTO: 87 FL (ref 80–100)
NITRITE UR QL STRIP.AUTO: NEGATIVE
NRBC BLD-RTO: 0 /100 WBCS (ref 0–0)
PH UR STRIP.AUTO: 5.5 [PH]
PLATELET # BLD AUTO: 178 X10*3/UL (ref 150–450)
POTASSIUM SERPL-SCNC: 4.1 MMOL/L (ref 3.5–5.3)
PROT UR STRIP.AUTO-MCNC: NEGATIVE MG/DL
PSA SERPL-MCNC: <0.1 NG/ML
RBC # BLD AUTO: 5 X10*6/UL (ref 4.5–5.9)
RBC # UR STRIP.AUTO: NEGATIVE MG/DL
SODIUM SERPL-SCNC: 138 MMOL/L (ref 136–145)
SP GR UR STRIP.AUTO: 1.01
UROBILINOGEN UR STRIP.AUTO-MCNC: NORMAL MG/DL
WBC # BLD AUTO: 6.9 X10*3/UL (ref 4.4–11.3)

## 2025-04-11 PROCEDURE — 36415 COLL VENOUS BLD VENIPUNCTURE: CPT

## 2025-04-11 PROCEDURE — 87086 URINE CULTURE/COLONY COUNT: CPT | Mod: ELYLAB

## 2025-04-11 PROCEDURE — 81003 URINALYSIS AUTO W/O SCOPE: CPT

## 2025-04-11 PROCEDURE — 80048 BASIC METABOLIC PNL TOTAL CA: CPT

## 2025-04-11 PROCEDURE — 84153 ASSAY OF PSA TOTAL: CPT | Mod: ELYLAB

## 2025-04-11 PROCEDURE — 85027 COMPLETE CBC AUTOMATED: CPT

## 2025-04-12 LAB — BACTERIA UR CULT: NO GROWTH

## 2025-04-17 RX ORDER — SODIUM CHLORIDE, SODIUM LACTATE, POTASSIUM CHLORIDE, CALCIUM CHLORIDE 600; 310; 30; 20 MG/100ML; MG/100ML; MG/100ML; MG/100ML
100 INJECTION, SOLUTION INTRAVENOUS CONTINUOUS
Status: CANCELLED | OUTPATIENT
Start: 2025-04-18 | End: 2025-04-18

## 2025-04-17 RX ORDER — CIPROFLOXACIN 2 MG/ML
400 INJECTION, SOLUTION INTRAVENOUS ONCE
Status: CANCELLED | OUTPATIENT
Start: 2025-04-18

## 2025-04-17 NOTE — H&P
79-year-old white male  Remote history prostate cancer treated with brachytherapy and Lupron about 4 to 5 years ago  Prior history of gross hematuria secondary to UTI with reactive hemorrhagic cystitis and radiation cystitis  Remote history urethral stricture   placed on Flomax and now returning for a 6-month check with lab work, cystoscopy dilation under MAC IV sedation as an outpatient, to be admitted Friday, April 18, 2025           Social History:  He reports that he has quit smoking. His smoking use included cigarettes. He has never used smokeless tobacco. He reports that he does not currently use alcohol. He reports that he does not use drugs.     Family History:               Family History  Problem Relation Name Age of Onset   Other (acute myocardial infarction) Mother       Coronary artery disease Mother       Diabetes Mother       Other (ptca) Mother       Coronary artery disease Father       Other (cva) Sister            Allergies:  Barium iodide and Pine tar     Outpatient Medications:           Current Outpatient Medications  Medication Instructions   amLODIPine (Norvasc) 5 mg tablet 1 tablet, oral, Daily   aspirin 81 mg chewable tablet 1 tablet, oral, Daily   cholecalciferol (Vitamin D-3) 125 MCG (5000 UT) capsule Take 1 capsule (125 mcg) by mouth once daily.   cyanocobalamin, vitamin B-12, 2,500 mcg tablet,chewable Chew 2,500 mcg once daily.   levothyroxine (Synthroid, Levoxyl) 50 mcg tablet 1 tablet, oral, Daily   levothyroxine (SYNTHROID, LEVOXYL) 25 mcg, oral, 3 times weekly   losartan (COZAAR) 50 mg, oral, 2 times daily   metoprolol tartrate (LOPRESSOR) 50 mg, oral, 2 times daily   nitroglycerin (NITROSTAT) 0.4 mg, sublingual, Every 5 min PRN, place 1 tablet under tongue every 5 minutes for up to 3 doses as needed for chest pain. Call 911 if pain persists   pravastatin (PRAVACHOL) 40 mg, oral, Daily   tamsulosin (Flomax) 0.4 mg 24 hr capsule 1 capsule, oral, Daily   ubidecarenone (ULTRA  COQ10 ORAL) 100 mg, oral, Once  Review of Systems   Constitutional: Negative.   Cardiovascular: Negative.    Respiratory: Negative.     Musculoskeletal: Negative.    Neurological: Negative.    All other systems reviewed and are negative.  Thyroid medication was adjusted per patient report.           Physical Exam  Cardiovascular:      Rate and Rhythm: Normal rate.      Pulses: Normal pulses.      Heart sounds: Normal heart sounds.      Comments: Left side device  Pulmonary:      Effort: Pulmonary effort is normal.   Musculoskeletal:      Right lower le+ Edema present.      Left lower le+ Edema present.   Neurological:      General: No focal deficit present.      Mental Status: He is alert.         Remote history prostate cancer, brachytherapy and Lupron, radiation cystitis and gross hematuria, urethral stricture  Now admitted for cystoscopy with urethral calibration and dilation under MAC IV sedation  The considered procedures along with alternatives, complications, risks, benefits, short and long-term followup were reviewed in detail and patient indicates understanding and is agreeable to proceed and followup as recommended.

## 2025-04-18 ENCOUNTER — HOSPITAL ENCOUNTER (OUTPATIENT)
Facility: HOSPITAL | Age: 80
Setting detail: OUTPATIENT SURGERY
Discharge: HOME | End: 2025-04-18
Attending: UROLOGY | Admitting: UROLOGY
Payer: MEDICARE

## 2025-04-18 ENCOUNTER — ANESTHESIA (OUTPATIENT)
Dept: OPERATING ROOM | Facility: HOSPITAL | Age: 80
End: 2025-04-18
Payer: MEDICARE

## 2025-04-18 ENCOUNTER — ANESTHESIA EVENT (OUTPATIENT)
Dept: OPERATING ROOM | Facility: HOSPITAL | Age: 80
End: 2025-04-18
Payer: MEDICARE

## 2025-04-18 VITALS
HEART RATE: 60 BPM | OXYGEN SATURATION: 93 % | HEIGHT: 64 IN | WEIGHT: 266.76 LBS | SYSTOLIC BLOOD PRESSURE: 151 MMHG | RESPIRATION RATE: 18 BRPM | DIASTOLIC BLOOD PRESSURE: 74 MMHG | BODY MASS INDEX: 45.54 KG/M2 | TEMPERATURE: 96.8 F

## 2025-04-18 DIAGNOSIS — R39.11 HESITANCY: ICD-10-CM

## 2025-04-18 DIAGNOSIS — R35.0 URINARY FREQUENCY: ICD-10-CM

## 2025-04-18 DIAGNOSIS — C61 PROSTATE CANCER (MULTI): ICD-10-CM

## 2025-04-18 DIAGNOSIS — N35.911 STRICTURE OF URETHRAL MEATUS IN MALE, UNSPECIFIED STRICTURE TYPE: ICD-10-CM

## 2025-04-18 DIAGNOSIS — R39.16 STRAINING ON URINATION: ICD-10-CM

## 2025-04-18 PROCEDURE — 2500000004 HC RX 250 GENERAL PHARMACY W/ HCPCS (ALT 636 FOR OP/ED): Mod: JZ | Performed by: UROLOGY

## 2025-04-18 PROCEDURE — 2500000004 HC RX 250 GENERAL PHARMACY W/ HCPCS (ALT 636 FOR OP/ED): Mod: JZ | Performed by: STUDENT IN AN ORGANIZED HEALTH CARE EDUCATION/TRAINING PROGRAM

## 2025-04-18 PROCEDURE — 3600000003 HC OR TIME - INITIAL BASE CHARGE - PROCEDURE LEVEL THREE: Performed by: UROLOGY

## 2025-04-18 PROCEDURE — 3600000008 HC OR TIME - EACH INCREMENTAL 1 MINUTE - PROCEDURE LEVEL THREE: Performed by: UROLOGY

## 2025-04-18 PROCEDURE — 3700000002 HC GENERAL ANESTHESIA TIME - EACH INCREMENTAL 1 MINUTE: Performed by: UROLOGY

## 2025-04-18 PROCEDURE — 87086 URINE CULTURE/COLONY COUNT: CPT | Mod: ELYLAB | Performed by: UROLOGY

## 2025-04-18 PROCEDURE — 7100000009 HC PHASE TWO TIME - INITIAL BASE CHARGE: Performed by: UROLOGY

## 2025-04-18 PROCEDURE — 7100000010 HC PHASE TWO TIME - EACH INCREMENTAL 1 MINUTE: Performed by: UROLOGY

## 2025-04-18 PROCEDURE — 3700000001 HC GENERAL ANESTHESIA TIME - INITIAL BASE CHARGE: Performed by: UROLOGY

## 2025-04-18 PROCEDURE — 2500000005 HC RX 250 GENERAL PHARMACY W/O HCPCS: Performed by: UROLOGY

## 2025-04-18 RX ORDER — LIDOCAINE HYDROCHLORIDE 20 MG/ML
JELLY TOPICAL AS NEEDED
Status: DISCONTINUED | OUTPATIENT
Start: 2025-04-18 | End: 2025-04-18 | Stop reason: HOSPADM

## 2025-04-18 RX ORDER — METOCLOPRAMIDE HYDROCHLORIDE 5 MG/ML
10 INJECTION INTRAMUSCULAR; INTRAVENOUS ONCE AS NEEDED
Status: CANCELLED | OUTPATIENT
Start: 2025-04-18

## 2025-04-18 RX ORDER — PROPOFOL 10 MG/ML
INJECTION, EMULSION INTRAVENOUS AS NEEDED
Status: DISCONTINUED | OUTPATIENT
Start: 2025-04-18 | End: 2025-04-18

## 2025-04-18 RX ORDER — FENTANYL CITRATE 50 UG/ML
12.5 INJECTION, SOLUTION INTRAMUSCULAR; INTRAVENOUS EVERY 5 MIN PRN
Refills: 0 | Status: CANCELLED | OUTPATIENT
Start: 2025-04-18

## 2025-04-18 RX ORDER — LIDOCAINE HYDROCHLORIDE 10 MG/ML
0.1 INJECTION, SOLUTION EPIDURAL; INFILTRATION; INTRACAUDAL; PERINEURAL ONCE
Status: CANCELLED | OUTPATIENT
Start: 2025-04-18 | End: 2025-04-18

## 2025-04-18 RX ORDER — ALBUTEROL SULFATE 0.83 MG/ML
2.5 SOLUTION RESPIRATORY (INHALATION) ONCE AS NEEDED
Status: CANCELLED | OUTPATIENT
Start: 2025-04-18

## 2025-04-18 RX ORDER — ACETAMINOPHEN 325 MG/1
650 TABLET ORAL EVERY 4 HOURS PRN
Status: CANCELLED | OUTPATIENT
Start: 2025-04-18

## 2025-04-18 RX ORDER — LIDOCAINE HYDROCHLORIDE 20 MG/ML
INJECTION, SOLUTION EPIDURAL; INFILTRATION; INTRACAUDAL; PERINEURAL AS NEEDED
Status: DISCONTINUED | OUTPATIENT
Start: 2025-04-18 | End: 2025-04-18

## 2025-04-18 RX ORDER — ONDANSETRON HYDROCHLORIDE 2 MG/ML
4 INJECTION, SOLUTION INTRAVENOUS ONCE AS NEEDED
Status: CANCELLED | OUTPATIENT
Start: 2025-04-18

## 2025-04-18 RX ORDER — HYDRALAZINE HYDROCHLORIDE 20 MG/ML
5 INJECTION INTRAMUSCULAR; INTRAVENOUS EVERY 30 MIN PRN
Status: CANCELLED | OUTPATIENT
Start: 2025-04-18

## 2025-04-18 RX ORDER — SODIUM CHLORIDE, SODIUM LACTATE, POTASSIUM CHLORIDE, CALCIUM CHLORIDE 600; 310; 30; 20 MG/100ML; MG/100ML; MG/100ML; MG/100ML
100 INJECTION, SOLUTION INTRAVENOUS CONTINUOUS
Status: CANCELLED | OUTPATIENT
Start: 2025-04-18 | End: 2025-04-19

## 2025-04-18 RX ORDER — SODIUM CHLORIDE, SODIUM LACTATE, POTASSIUM CHLORIDE, CALCIUM CHLORIDE 600; 310; 30; 20 MG/100ML; MG/100ML; MG/100ML; MG/100ML
100 INJECTION, SOLUTION INTRAVENOUS CONTINUOUS
Status: DISCONTINUED | OUTPATIENT
Start: 2025-04-18 | End: 2025-04-18 | Stop reason: HOSPADM

## 2025-04-18 RX ORDER — CIPROFLOXACIN 2 MG/ML
400 INJECTION, SOLUTION INTRAVENOUS ONCE
Status: COMPLETED | OUTPATIENT
Start: 2025-04-18 | End: 2025-04-18

## 2025-04-18 RX ORDER — LABETALOL HYDROCHLORIDE 5 MG/ML
5 INJECTION, SOLUTION INTRAVENOUS ONCE AS NEEDED
Status: CANCELLED | OUTPATIENT
Start: 2025-04-18

## 2025-04-18 RX ORDER — WATER 1 ML/ML
INJECTION IRRIGATION AS NEEDED
Status: DISCONTINUED | OUTPATIENT
Start: 2025-04-18 | End: 2025-04-18 | Stop reason: HOSPADM

## 2025-04-18 RX ADMIN — LIDOCAINE HYDROCHLORIDE 100 MG: 20 INJECTION, SOLUTION EPIDURAL; INFILTRATION; INTRACAUDAL; PERINEURAL at 14:00

## 2025-04-18 RX ADMIN — PROPOFOL 100 MG: 10 INJECTION, EMULSION INTRAVENOUS at 13:59

## 2025-04-18 RX ADMIN — PROPOFOL 75 MCG/KG/MIN: 10 INJECTION, EMULSION INTRAVENOUS at 14:00

## 2025-04-18 RX ADMIN — SODIUM CHLORIDE, SODIUM LACTATE, POTASSIUM CHLORIDE, AND CALCIUM CHLORIDE 100 ML/HR: .6; .31; .03; .02 INJECTION, SOLUTION INTRAVENOUS at 11:12

## 2025-04-18 RX ADMIN — CIPROFLOXACIN 400 MG: 400 INJECTION, SOLUTION INTRAVENOUS at 13:26

## 2025-04-18 SDOH — HEALTH STABILITY: MENTAL HEALTH: CURRENT SMOKER: 0

## 2025-04-18 ASSESSMENT — COLUMBIA-SUICIDE SEVERITY RATING SCALE - C-SSRS
6. HAVE YOU EVER DONE ANYTHING, STARTED TO DO ANYTHING, OR PREPARED TO DO ANYTHING TO END YOUR LIFE?: NO
1. IN THE PAST MONTH, HAVE YOU WISHED YOU WERE DEAD OR WISHED YOU COULD GO TO SLEEP AND NOT WAKE UP?: NO
2. HAVE YOU ACTUALLY HAD ANY THOUGHTS OF KILLING YOURSELF?: NO

## 2025-04-18 ASSESSMENT — PAIN - FUNCTIONAL ASSESSMENT
PAIN_FUNCTIONAL_ASSESSMENT: 0-10
PAIN_FUNCTIONAL_ASSESSMENT: 0-10

## 2025-04-18 ASSESSMENT — PAIN SCALES - GENERAL
PAIN_LEVEL: 0
PAINLEVEL_OUTOF10: 0 - NO PAIN

## 2025-04-18 NOTE — ANESTHESIA PREPROCEDURE EVALUATION
Patient: Sid Farmer    Procedure Information       Date/Time: 04/18/25 1150    Procedure: CYSTOSCOPY, WITH URETHRAL DILATION - cystoscopy with dilation MAC sedation, [employer] Pacemaker  Barium Iodide Allergy    Location: [facility]    Surgeons: Louis D'Amico, MD            Relevant Problems   Anesthesia (within normal limits)      Cardiac  EKG:     Echo:  1. Left ventricular systolic function is normal with a 55-60% estimated ejection fraction.   2. Abnormal septal motion.   3. Spectral Doppler shows an impaired relaxation pattern of left ventricular diastolic filling.   4. Normal right ventricular chamber size and function.      Pacemaker device artifact is noted.   5. Mild (1+) mitral regurgitation.   6. Mild (1+) tricuspid regurgitation with estimated RVSP of 36 mmHg.   7. No previous available for comparison.     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)   (+) Hypothyroidism   (+) Morbid obesity (Multi)   (+) Stage 2 chronic kidney disease due to type 2 diabetes mellitus (Multi)   (+) Type 2 diabetes mellitus without retinopathy (Multi)      Skin   (+) Eczematous dermatitis      Circulatory   (+) CAD S/P percutaneous coronary angioplasty   (+) Chronic venous insufficiency      Digestive   (+) History of gastroesophageal reflux (GERD)      Tobacco   (+) Former cigarette smoker       Clinical information reviewed:   Tobacco  Allergies  Meds   Problems  Med Hx  Surg Hx   Fam Hx  Soc   Hx        NPO Detail:  NPO/Void Status  Carbohydrate Drink Given Prior to Surgery? : N  Date of Last Liquid: 04/17/25  Time of Last Liquid: 2100  Date of Last Solid: 04/17/25  Time of Last Solid: 2130  Last Intake Type: Clear fluids  Time of Last Void: 1115         Physical Exam    Airway  Mallampati: III  TM distance: >3 FB  Mouth opening: 3 or more finger widths     Cardiovascular   Rhythm: regular  Rate: normal     Dental - normal exam     Pulmonary - normal exam   Abdominal (+) obese             Anesthesia Plan    History of general anesthesia?: yes  History of complications of general anesthesia?: no    ASA 3     MAC     The patient is not a current smoker.  Education provided regarding risk of obstructive sleep apnea.  intravenous induction   Anesthetic plan and risks discussed with patient.

## 2025-04-18 NOTE — INTERVAL H&P NOTE
H&P reviewed. The patient was examined and there are no changes to the H&P.    On today's interview and examination the patient reports that at the time of the last dilation last October he had an excellent stream with good flow afterwards  In the past few weeks he has noticed a slight decrease in the force of the flow and some hesitancy but not too severe  Urine otherwise has been clear no dysuria no signs of infection  Plan to continue to proceed with cystourethroscopy, urethral calibration and dilation as clinically indicated, possible Farias catheter use as clinically indicated afterwards    Reviewed and agreeable

## 2025-04-18 NOTE — ANESTHESIA POSTPROCEDURE EVALUATION
Patient: Sid Farmer    Procedure Summary       Date: 04/18/25 Room / Location: [facility]    Anesthesia Start: 1354 Anesthesia Stop: 1426    Procedure: CYSTOSCOPY, WITH URETHRAL DILATION Diagnosis:       Stricture of urethral meatus in male, unspecified stricture type      Prostate cancer (Multi)      Urinary frequency      Hesitancy      Straining on urination      (urethral stricture, prostate canacer, frequency, hesitancy, straining of [id number])    Surgeons: Louis D'Amico, MD Responsible Provider: Venecia Matamoros MD    Anesthesia Type: MAC ASA Status: 3            Anesthesia Type: MAC    Vitals Value Taken Time   /64 04/18/25 14:26   Temp 36.2 04/18/25 14:26   Pulse 61 04/18/25 14:26   Resp 14 04/18/25 14:26   SpO2 95 04/18/25 14:26       Anesthesia Post Evaluation    Patient location during evaluation: bedside  Patient participation: complete - patient participated  Level of consciousness: sleepy but conscious  Pain score: 0  Pain management: adequate  Multimodal analgesia pain management approach  Airway patency: patent  Cardiovascular status: acceptable and blood pressure returned to baseline  Respiratory status: acceptable  Hydration status: acceptable  Postoperative Nausea and Vomiting: none        No notable events documented.

## 2025-04-18 NOTE — OP NOTE
Urology postop note for Friday, April 18, 2025    PREOPERATIVE DIAGNOSIS: []   History of membranous urethral stricture, bladder neck contracture and obstruction, remote history of prostate cancer status post radiation treatment, lower urinary tract symptoms    POSTOPERATIVE DIAGNOSIS: [] Same as preop   , pending urine culture results    OPERATIVE PROCEDURE: []   Cystourethroscopy with urethral calibration and dilation    ANESTHESIA: []   Parenteral MAC IV sedation along with 20 cc of 2% plain lidocaine jelly per urethra    ASSISTANTS: [] None     IV FLUIDS: []   As per record    ESTIMATED BLOOD LOSS: [] None     PROCEDURE AS FOLLOWS: []    AN APPROPRIATE HUDDLE AND TIMEOUT PROCEDURE WAS APPROPRIATELY CARRIED OUT BEFORE AND AFTER ANESTHESIA WAS ADMINISTERED IN SATISFACTORY FASHION WITH SURGEON PATIENT AND ALL OPERATING ROOM PERSONNEL PRESENT ACCORDING TO ROUTINE POLICY.    After administration of satisfactory parenteral anesthesia, the patient was placed in lithotomy position prepped and draped per routine and 20 cc of 2% plain lidocaine jelly was administered per urethra for additional local anesthesia.    The 22.5 [place] rigid Olympus cystoscopic unit was then utilized for the procedure  The distal urethra was unremarkable  The membranous urethra and bladder neck shows evidence of a relatively moderate recurrent stricture and contracture formation however it did allow the 22.5 [place] scope to pass through with only relatively moderate resistance.  The bladder was entered and a sample of urine obtained and sent for urine culture sensitivity  The bladder were inspected with both 30 degree and 70 degree lenses  The bladder mucosa shows mild trabeculation otherwise is unremarkable there are no tumors stones or suspicious lesions.  Right and left ureteral orifice are unremarkable with clear efflux  The cystoscope was then removed    [place] sounds were then used to further dilate the urethra to 28-30 [place] meeting  relatively mild resistance  There were no complications no bleeding and the patient tolerated procedure well and was returned to the outpatient recovery discharge area in satisfactory condition.    Findings and follow-up were also reviewed with the patient's brother Gonzalo at cell phone [phone] immediately following the procedure    Await final urine culture sensitivity and call patient with results next week  Tentatively are planning follow-up with similar procedure in about 6 months to include cystoscopy dilation as well as routine lab work and ultrasound kidney bladder

## 2025-04-18 NOTE — DISCHARGE INSTRUCTIONS
Moderate Sedation in Adults Discharge Instructions    About this topic  Moderate sedation is also known as conscious sedation. It changes your state of being awake or consciousness. With this sedation, you may feel slight pain or pressure during a procedure. The drugs help you to relax and may even allow you to sleep. It will be easy to wake you and you may talk and answer questions while under sedation. Most likely, you will not remember what happens while under this sedation.  What care is needed at home?  Ask your doctor what you need to do when you go home. Make sure you understand everything the doctor says. This way you will know what you need to do.  You will not be allowed to drive right away after the procedure. Ask a family member or a friend to drive you home.  Do not operate heavy or dangerous machinery for at least 24 hours.  Do not make major decisions or sign important papers for at least 24 hours. You may not be thinking clearly.  Avoid beer, wine, or mixed drinks (alcohol) for at least 24 hours.  You are at a higher risk of falling for at least 24 hours after moderate sedation.  Take extra care when you get up.  Do not change positions quickly.  Do not rush when you need to go to the bathroom or to answer the phone.  Ask for help if you feel unsteady when you try to walk.  Wear shoes with non-slip soles and low heels.  What follow-up care is needed?  Your doctor may ask you to make visits to the office to check on your progress. Be sure to keep these visits. Your doctor may also refer you to other doctors or tell you that you need more tests or care.  What drugs may be needed?  The doctor may order drugs to:  Help with pain  Treat an upset stomach or throwing up  Will physical activity be limited?  Rest for the day of the procedure. Avoid strenuous activities like heavy lifting and hard exercise. Talk to your doctor about whether you need to limit lifting or exercise after your procedure.  What  changes to diet are needed?  Start with a light diet when you are fully awake. This includes things that are easy to swallow like soups, pudding, jello, toast, and eggs. Slowly progress to your normal diet.  What problems could happen?  Low blood pressure  Breathing problems  Upset stomach or throwing up  Dizziness  When do I need to call the doctor?  Feel dizzy, weak, or tired  Faint  Very bad headache  Upset stomach or throwing up  To follow up for more tests or care  Teach Back: Helping You Understand  The Teach Back Method helps you understand the information we are giving you. After you talk with the staff, tell them in your own words what you learned. This helps to make sure the staff has described each thing clearly. It also helps to explain things that may have been confusing. Before going home, make sure you can do these:  I can tell you about my procedure.  I can tell you if I need more tests or care.  I can tell you what is good for me to eat and drink the next day.  I can tell you what I would do if I feel dizzy, weak, or tired.  Last Reviewed Date  2020-03-02     Physician Phone List Provided

## 2025-04-19 NOTE — PROGRESS NOTES
"  Patient ID: Sid Farmer is a 79 y.o. male who presents for Follow-up (6 month visit,palpitations and near syncope).  History of Present Illness  The patient, with a history of heart disease, thyroid issues, and diabetes, presents with a dull, heartburn-like sensation that has been occurring on and off for the past two or three weeks. The discomfort is described as being 'all over' and lasts for a short time during each episode. The patient is not very active and does not note any correlation between the discomfort and activity or rest.    The patient also reports swelling in one foot, which is more pronounced than the other. The swelling is particularly noticeable on the top of the foot and appears to be exacerbated by prolonged periods of sitting due to mobility issues. The swelling reduces overnight when the patient is sleeping but returns during the day.    The patient recently underwent a cystoscopy to open up his urethra. He also mentions a history of abnormal stress tests, with the most recent one showing areas of the heart with reduced blood flow.     The patient has a theory that his current discomfort may be related to his consumption of salty snacks, such as potato chips, as he noticed a reduction in discomfort when he stopped eating these snacks.    PMHx:  See list    PSHx:  See list    Social Hx:  Social History[1]    Family Hx:  Family History[2]    Review of Systems   Constitutional: Negative for malaise/fatigue.   Cardiovascular:  Positive for chest pain and dyspnea on exertion. Negative for claudication, cyanosis, irregular heartbeat, leg swelling, near-syncope, orthopnea, paroxysmal nocturnal dyspnea and syncope.   Respiratory:  Negative for cough, shortness of breath, snoring and wheezing.    All other systems reviewed and are negative.      Objective     /60 (BP Location: Left arm, Patient Position: Sitting)   Pulse 64   Ht 1.626 m (5' 4\")   Wt 125 kg (275 lb)   BMI 47.20 kg/m²  " "      LABS:  CBC:   Lab Results   Component Value Date    WBC 6.9 04/11/2025    RBC 5.00 04/11/2025    HGB 14.7 04/11/2025    HCT 43.4 04/11/2025    MCV 87 04/11/2025    MCH 29.4 04/11/2025    MCHC 33.9 04/11/2025    RDW 12.6 04/11/2025     04/11/2025    MPV 10.0 10/18/2023     CBC with Differential:    Lab Results   Component Value Date    WBC 6.9 04/11/2025    RBC 5.00 04/11/2025    HGB 14.7 04/11/2025    HCT 43.4 04/11/2025     04/11/2025    MCV 87 04/11/2025    MCH 29.4 04/11/2025    MCHC 33.9 04/11/2025    RDW 12.6 04/11/2025    NRBC 0.0 04/11/2025    LYMPHOPCT 14.6 11/09/2024    MONOPCT 9.4 11/09/2024    EOSPCT 4.0 11/09/2024    BASOPCT 1.9 11/09/2024    MONOSABS 0.59 11/09/2024    LYMPHSABS 0.92 11/09/2024    EOSABS 0.25 11/09/2024    BASOSABS 0.12 (H) 11/09/2024     CMP:    Lab Results   Component Value Date     04/11/2025    K 4.1 04/11/2025     04/11/2025    CO2 25 04/11/2025    BUN 19 04/11/2025    CREATININE 1.25 04/11/2025    GLUCOSE 119 (H) 04/11/2025    PROT 6.3 (L) 11/09/2024    CALCIUM 9.4 04/11/2025    BILITOT 0.6 11/09/2024    ALKPHOS 50 11/09/2024    AST 17 11/09/2024    ALT 19 11/09/2024     BMP:    Lab Results   Component Value Date     04/11/2025    K 4.1 04/11/2025     04/11/2025    CO2 25 04/11/2025    BUN 19 04/11/2025    CREATININE 1.25 04/11/2025    CALCIUM 9.4 04/11/2025    GLUCOSE 119 (H) 04/11/2025     Magnesium:No results found for: \"MG\"        Constitutional:       Appearance: Normal and healthy appearance. Well-developed and not in distress. Morbidly obese.      Comments: Left sided device well healed   Neck:      Vascular: No JVR. JVD normal.   Pulmonary:      Effort: Pulmonary effort is normal.      Breath sounds: Normal breath sounds. No wheezing. No rhonchi. No rales.   Chest:      Chest wall: Not tender to palpatation.   Cardiovascular:      PMI at left midclavicular line. Normal rate. Regular rhythm. Normal S1. Normal S2.       Murmurs: " There is no murmur.      No gallop.  No click. No rub.   Pulses:     Intact distal pulses.   Edema:     Peripheral edema absent.   Abdominal:      Tenderness: There is no abdominal tenderness.   Musculoskeletal: Normal range of motion.         General: No tenderness. Skin:     General: Skin is warm and dry.   Neurological:      General: No focal deficit present.      Mental Status: Alert and oriented to person, place and time.           Device check. See scanned.  ECG. See scanned.    Assessment & Plan  Coronary artery disease  Intermittent chest discomfort suggests possible angina. Abnormal stress test and past angioplasty indicate potential disease progression. Pacemaker functioning well.  - Contacted Dr. Jiang to discuss need for cardiac catheterization.  - Per patient request, office will schedule appointment with Dr. Jiang to evaluate symptoms and catheterization necessity.    Cardiac pacemaker in situ.  Field alert device, initial visit  Sinus node dysfunction, complete heart block  [employer] 2272 pacemaker on field alert  Chronic atrial lead noise, unchanged  Pacemaker functioning as expected with stable rhythm.  -Ordered device checks  -Ultimately EP office visits with Cindi URIBE and myself  Alternate remote checks with device clinic paired with EP office visit    Peripheral edema  Asymmetric right foot swelling, possibly due to venous insufficiency, gout, or arthritis. Compression stockings discussed, he declines  - Patient will discuss foot swelling with Dr. Thornton for further evaluation.  - Consider compression stockings with assistance or alternative designs.    Benign essential hypertension  Blood pressure well-controlled.  -Reviewed medication    Type 2 diabetes mellitus without complications  No recent glucose monitoring. No acute diabetes issues.  -Defer to PCP    Hypothyroidism  May contribute to fatigue and increased sleep.    Follow-up  Scheduled follow-up with Dr. Thornton for  the  - Schedule follow-up with Dr. Jiang for next week.  - See orders for EP follow-up    Other medical issues of hyperglycemia, hypothyroidism, hypertension, obstructive sleep apnea, and overweight noted    Counseled greater than 50% of the visit.  The patient and I discussed arrhythmia, ECG, shared decision making, stress test, consideration for cardiac catheterization, which she declines at this time, referral back to Dr. Jiang for discussion of symptoms, field alert device, initial visit treatment options, risk, benefits, and imponderables.  Lifestyle modifications reviewed.  All questions answered.  Patient appreciative of care  Assessment & Plan  Longstanding persistent atrial fibrillation (Multi)    SSS (sick sinus syndrome) (Multi)    Ventricular tachycardia (Multi)    Chest discomfort    Cardiac pacemaker in situ    CAD S/P percutaneous coronary angioplasty    Atrioventricular block, second degree    Former cigarette smoker    BMI 45.0-49.9, adult (Multi)    Encounter to discuss test results    Encounter for medication review and counseling    Mechanical complication of cardiac pacemaker, initial encounter      Beba Calloway MD     This medical note was created with the assistance of artificial intelligence (AI) for documentation purposes. The content has been reviewed and confirmed by the healthcare provider for accuracy and completeness. Patient consented to the use of audio recording and use of AI during their visit.     Total, 42, inclusive of review of serial device checks, last stress test, discussion for cardiac catheterization       [1]   Social History  Socioeconomic History    Marital status:    Tobacco Use    Smoking status: Never    Smokeless tobacco: Never   Vaping Use    Vaping status: Never Used   Substance and Sexual Activity    Alcohol use: Not Currently     Comment: rare, New Years    Drug use: Never    Sexual activity: Defer   [2]   Family History  Problem Relation  Name Age of Onset    Other (acute myocardial infarction) Mother      Coronary artery disease Mother      Diabetes Mother      Other (ptca) Mother      Coronary artery disease Father      Other (cva) Sister

## 2025-04-20 LAB — BACTERIA UR CULT: NO GROWTH

## 2025-04-22 ENCOUNTER — APPOINTMENT (OUTPATIENT)
Dept: CARDIOLOGY | Facility: CLINIC | Age: 80
End: 2025-04-22
Payer: MEDICARE

## 2025-04-22 ENCOUNTER — HOSPITAL ENCOUNTER (OUTPATIENT)
Dept: CARDIOLOGY | Facility: HOSPITAL | Age: 80
Discharge: HOME | End: 2025-04-22
Payer: MEDICARE

## 2025-04-22 VITALS
HEIGHT: 64 IN | SYSTOLIC BLOOD PRESSURE: 118 MMHG | BODY MASS INDEX: 46.95 KG/M2 | WEIGHT: 275 LBS | DIASTOLIC BLOOD PRESSURE: 60 MMHG | HEART RATE: 64 BPM

## 2025-04-22 DIAGNOSIS — R07.89 CHEST DISCOMFORT: ICD-10-CM

## 2025-04-22 DIAGNOSIS — I25.10 CAD S/P PERCUTANEOUS CORONARY ANGIOPLASTY: ICD-10-CM

## 2025-04-22 DIAGNOSIS — Z87.891 FORMER CIGARETTE SMOKER: ICD-10-CM

## 2025-04-22 DIAGNOSIS — Z95.0 CARDIAC PACEMAKER IN SITU: ICD-10-CM

## 2025-04-22 DIAGNOSIS — I48.11 LONGSTANDING PERSISTENT ATRIAL FIBRILLATION (MULTI): ICD-10-CM

## 2025-04-22 DIAGNOSIS — Z98.61 CAD S/P PERCUTANEOUS CORONARY ANGIOPLASTY: ICD-10-CM

## 2025-04-22 DIAGNOSIS — I49.5 SSS (SICK SINUS SYNDROME) (MULTI): ICD-10-CM

## 2025-04-22 DIAGNOSIS — Z71.89 ENCOUNTER FOR MEDICATION REVIEW AND COUNSELING: ICD-10-CM

## 2025-04-22 DIAGNOSIS — I44.1 ATRIOVENTRICULAR BLOCK, SECOND DEGREE: ICD-10-CM

## 2025-04-22 DIAGNOSIS — I47.20 VENTRICULAR TACHYCARDIA (MULTI): ICD-10-CM

## 2025-04-22 DIAGNOSIS — Z71.2 ENCOUNTER TO DISCUSS TEST RESULTS: ICD-10-CM

## 2025-04-22 DIAGNOSIS — T82.191A MECHANICAL COMPLICATION OF CARDIAC PACEMAKER, INITIAL ENCOUNTER: Primary | ICD-10-CM

## 2025-04-22 PROBLEM — E66.01 MORBID OBESITY (MULTI): Status: RESOLVED | Noted: 2023-10-09 | Resolved: 2025-04-22

## 2025-04-22 PROCEDURE — 99215 OFFICE O/P EST HI 40 MIN: CPT | Performed by: INTERNAL MEDICINE

## 2025-04-22 PROCEDURE — 3078F DIAST BP <80 MM HG: CPT | Performed by: INTERNAL MEDICINE

## 2025-04-22 PROCEDURE — 3074F SYST BP LT 130 MM HG: CPT | Performed by: INTERNAL MEDICINE

## 2025-04-22 PROCEDURE — 93280 PM DEVICE PROGR EVAL DUAL: CPT

## 2025-04-22 PROCEDURE — 93000 ELECTROCARDIOGRAM COMPLETE: CPT | Performed by: INTERNAL MEDICINE

## 2025-04-22 PROCEDURE — 1159F MED LIST DOCD IN RCRD: CPT | Performed by: INTERNAL MEDICINE

## 2025-04-22 PROCEDURE — 1036F TOBACCO NON-USER: CPT | Performed by: INTERNAL MEDICINE

## 2025-04-22 ASSESSMENT — ENCOUNTER SYMPTOMS
CLAUDICATION: 0
IRREGULAR HEARTBEAT: 0
SHORTNESS OF BREATH: 0
ORTHOPNEA: 0
WHEEZING: 0
NEAR-SYNCOPE: 0
SYNCOPE: 0
DYSPNEA ON EXERTION: 1
COUGH: 0
SNORING: 0
PND: 0

## 2025-04-22 NOTE — PATIENT INSTRUCTIONS
Make sure you mention your foot swelling concerns to Dr. Thornton on Friday at your appointment.    We will have you see Dr. Jiang next week to discuss your last abnormal stress test, and your current chest discomfort.  Follow up in 6 months with Cindi, with an in- clinic device check same day  Remote device checks at 3 and 9 months  Continue same medications and treatments.   Patient educated on proper medication use.   Patient educated on risk factor modification.   Please bring any lab results from other providers / physicians to your next appointment.     Please bring all medicines, vitamins, and herbal supplements with you when you come to the office.     Prescriptions will not be filled unless you are compliant with your follow up appointments or have a follow up appointment scheduled as per instruction of your physician. Refills should be requested at the time of your visit.  Limit your sodium intake to 2000mg or less a day.  Increase your water intake.

## 2025-04-30 ENCOUNTER — APPOINTMENT (OUTPATIENT)
Dept: CARDIOLOGY | Facility: CLINIC | Age: 80
End: 2025-04-30
Payer: MEDICARE

## 2025-04-30 VITALS
HEART RATE: 72 BPM | SYSTOLIC BLOOD PRESSURE: 132 MMHG | BODY MASS INDEX: 46.4 KG/M2 | WEIGHT: 271.8 LBS | HEIGHT: 64 IN | DIASTOLIC BLOOD PRESSURE: 68 MMHG

## 2025-04-30 DIAGNOSIS — E78.2 MIXED HYPERLIPIDEMIA: ICD-10-CM

## 2025-04-30 DIAGNOSIS — R06.02 SHORTNESS OF BREATH: ICD-10-CM

## 2025-04-30 DIAGNOSIS — R07.9 CHEST PAIN, UNSPECIFIED TYPE: ICD-10-CM

## 2025-04-30 DIAGNOSIS — Z98.61 CAD S/P PERCUTANEOUS CORONARY ANGIOPLASTY: ICD-10-CM

## 2025-04-30 DIAGNOSIS — Z95.0 CARDIAC PACEMAKER IN SITU: ICD-10-CM

## 2025-04-30 DIAGNOSIS — Z87.891 FORMER CIGARETTE SMOKER: ICD-10-CM

## 2025-04-30 DIAGNOSIS — C61 PROSTATE CANCER (MULTI): ICD-10-CM

## 2025-04-30 DIAGNOSIS — G47.33 OSA (OBSTRUCTIVE SLEEP APNEA): ICD-10-CM

## 2025-04-30 DIAGNOSIS — I10 BENIGN ESSENTIAL HYPERTENSION: ICD-10-CM

## 2025-04-30 DIAGNOSIS — E03.9 HYPOTHYROIDISM, UNSPECIFIED TYPE: ICD-10-CM

## 2025-04-30 DIAGNOSIS — I87.2 CHRONIC VENOUS INSUFFICIENCY: ICD-10-CM

## 2025-04-30 DIAGNOSIS — I25.10 CAD S/P PERCUTANEOUS CORONARY ANGIOPLASTY: ICD-10-CM

## 2025-04-30 DIAGNOSIS — I49.5 SSS (SICK SINUS SYNDROME) (MULTI): ICD-10-CM

## 2025-04-30 DIAGNOSIS — Z87.19 HISTORY OF GASTROESOPHAGEAL REFLUX (GERD): ICD-10-CM

## 2025-04-30 DIAGNOSIS — I12.9 CHRONIC KIDNEY DISEASE DUE TO HYPERTENSION: ICD-10-CM

## 2025-04-30 DIAGNOSIS — E11.9 TYPE 2 DIABETES MELLITUS WITHOUT RETINOPATHY (MULTI): ICD-10-CM

## 2025-04-30 PROBLEM — E11.22 STAGE 2 CHRONIC KIDNEY DISEASE DUE TO TYPE 2 DIABETES MELLITUS (MULTI): Status: RESOLVED | Noted: 2023-10-09 | Resolved: 2025-04-30

## 2025-04-30 PROBLEM — N18.2 STAGE 2 CHRONIC KIDNEY DISEASE DUE TO TYPE 2 DIABETES MELLITUS (MULTI): Status: RESOLVED | Noted: 2023-10-09 | Resolved: 2025-04-30

## 2025-04-30 PROBLEM — E11.22 CKD STAGE 2 DUE TO TYPE 2 DIABETES MELLITUS (MULTI): Status: RESOLVED | Noted: 2023-08-29 | Resolved: 2025-04-30

## 2025-04-30 PROBLEM — N18.2 CKD STAGE 2 DUE TO TYPE 2 DIABETES MELLITUS (MULTI): Status: RESOLVED | Noted: 2023-08-29 | Resolved: 2025-04-30

## 2025-04-30 PROBLEM — T82.191A MECHANICAL COMPLICATION OF CARDIAC PACEMAKER: Status: RESOLVED | Noted: 2025-04-22 | Resolved: 2025-04-30

## 2025-04-30 PROBLEM — N18.30 STAGE 3 CHRONIC KIDNEY DISEASE (MULTI): Status: RESOLVED | Noted: 2023-10-09 | Resolved: 2025-04-30

## 2025-04-30 PROCEDURE — 99214 OFFICE O/P EST MOD 30 MIN: CPT | Performed by: INTERNAL MEDICINE

## 2025-04-30 PROCEDURE — 1159F MED LIST DOCD IN RCRD: CPT | Performed by: INTERNAL MEDICINE

## 2025-04-30 PROCEDURE — 1036F TOBACCO NON-USER: CPT | Performed by: INTERNAL MEDICINE

## 2025-04-30 PROCEDURE — 3078F DIAST BP <80 MM HG: CPT | Performed by: INTERNAL MEDICINE

## 2025-04-30 PROCEDURE — 3075F SYST BP GE 130 - 139MM HG: CPT | Performed by: INTERNAL MEDICINE

## 2025-04-30 NOTE — PROGRESS NOTES
"CARDIOLOGY OFFICE VISIT      CHIEF COMPLAINT  Chief Complaint   Patient presents with    Follow-up     Overdue 1 year follow-up for management of CAD, CHF, hypertension & hyperlipidemia.  LOV 2/7/2024.  States he gets an occasional \"squeeze\" in his heart,  swelling in his R leg & foot. He saw Dr. Calloway last week and she recommended he come to see you        HISTORY OF PRESENT ILLNESS  The patient is being seen today for evaluation of chest discomfort.  Recently he has been having some chest discomfort.  This sounds most likely after long discussion with him to be GI in nature.  I doubt this is myocardial ischemia.  We talked about cardiac catheterization which Dr. Cordero brought up but he does not wish to have this done.  We talked about nuclear stress test.  He does not wish to have this done either since he had one in the beginning of January of last year and that did not show any myocardial ischemia.  He will let me know if his symptoms getting worse.  He states he really does not do much physically.  He does have some dyspnea with activities due to his weight and physical deconditioning.  He states his main problem is significant swelling of his lower extremities and his feet.  This gets worse in the day goes on and is markedly improved in the morning.  I told him I believe he has venous insufficiency and lymphedema.  I am going to send him to a vein specialist to further evaluate.      IMPRESSION:   Coronary artery disease, non specific chest discomfort at times  Remote multivessel percutaneous coronary intervention, 2019  Essential hypertension.  Mixed hyperlipidemia.  Permanent pacemaker for sinus node dysfunction.  Obstructive sleep apnea, being managed with BiPAP.  History of gastroesophageal reflux.  Hypothyroidism, on replacement therapy.  Prostate cancer being treated with radiation. Dr. DAmico, [facility]. Has recurrent Cystoscopies with Dilation routinely.   Chronic renal insufficiency, stage III, being seen by " nephrology  Chronic venous insufficiency of lower extremities and lymphedema with bilateral pretibial edema  Diabetes Mellitus, Type 2  Morbid Obesity     Please excuse any errors in grammar or translation related to this dictation. Voice recognition software was utilized to prepare this document.     Past Medical History  Medical History[1]    Social History  Social History[2]    Family History   Family History[3]     Allergies:  RX Allergies[4]     Outpatient Medications:  Current Outpatient Medications   Medication Instructions    amLODIPine (Norvasc) 5 mg tablet 1 tablet, Daily    aspirin 81 mg chewable tablet 1 tablet, Daily    cholecalciferol (Vitamin D-3) 125 MCG (5000 UT) capsule Take 1 capsule (125 mcg) by mouth once daily.    cyanocobalamin, vitamin B-12, 2,500 mcg tablet,chewable Chew 2,500 mcg once daily.    levothyroxine (Synthroid, Levoxyl) 50 mcg tablet 1 tablet, Daily    levothyroxine (SYNTHROID, LEVOXYL) 25 mcg, 3 times weekly    losartan (COZAAR) 50 mg, oral, 2 times daily    metoprolol tartrate (LOPRESSOR) 50 mg, oral, 2 times daily    nitroglycerin (NITROSTAT) 0.4 mg, sublingual, Every 5 min PRN, place 1 tablet under tongue every 5 minutes for up to 3 doses as needed for chest pain. Call 911 if pain persists    pravastatin (PRAVACHOL) 40 mg, oral, Daily    tamsulosin (Flomax) 0.4 mg 24 hr capsule 1 capsule, Daily    ubidecarenone (ULTRA COQ10 ORAL) 100 mg, Once          REVIEW OF SYSTEMS  Review of Systems   Constitutional:        Walking cane for assistance    All other systems reviewed and are negative.        VITALS  Vitals:    04/30/25 1456   BP: 132/68   Pulse: 72       PHYSICAL EXAM  Vitals reviewed.   Constitutional:       Appearance: Normal and healthy appearance. Well-developed and not in distress.   Eyes:      Conjunctiva/sclera: Conjunctivae normal.      Pupils: Pupils are equal, round, and reactive to light.   Neck:      Vascular: No JVR. JVD normal.   Pulmonary:      Effort:  Pulmonary effort is normal.      Breath sounds: Normal breath sounds. No wheezing. No rhonchi. No rales.   Chest:      Chest wall: Not tender to palpatation.   Cardiovascular:      PMI at left midclavicular line. Normal rate. Regular rhythm. Normal S1. Normal S2.       Murmurs: There is no murmur.      No gallop.  No click. No rub.   Pulses:     Intact distal pulses.   Edema:     Pretibial: trace edema of the left pretibial area and 1+ edema of the right pretibial area.     Ankle: trace edema of the left ankle and 1+ edema of the right ankle.     Feet: trace edema of the left foot and 1+ edema of the right foot.  Abdominal:      Tenderness: There is no abdominal tenderness.   Musculoskeletal: Normal range of motion.         General: No tenderness.      Cervical back: Normal range of motion. Skin:     General: Skin is warm and dry.   Neurological:      General: No focal deficit present.      Mental Status: Alert and oriented to person, place and time.   Psychiatric:         Behavior: Behavior is cooperative.           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 Slim Ledezma MD  .    I, Dr. Slim Ledezma MD  , personally performed the services described in the documentation as scribed by Cipriano Degroot RN   in my presence, and confirm it is both accurate and complete.      Dr. Slim Ledezma MD  Thank you for allowing me to participate in  the care of this patient. Please do not hesitate to contact me with any further questions or concerns.         [1]   Past Medical History:  Diagnosis Date    2nd degree AV block     Anemia     Angina pectoris     Anxiety     Arthritis     Atrial arrhythmia     Atrial fibrillation (Multi)     Basal cell carcinoma     Bladder spasms     BPH (benign prostatic hyperplasia)     Carpal tunnel syndrome     Cataract     CKD (chronic kidney disease)     Stage 2 d/t DM2    Constipation     Coronary artery disease     COVID-19     NOT VACCINATED    Cystitis     Depression     Diabetes mellitus (Multi)     no medication; elevation of HgA1C    Diverticulosis     Dizziness     GERD (gastroesophageal reflux disease)     History of morbid obesity     History of OCD (obsessive compulsive disorder)     Hyperlipidemia     Hypertension     Hypothyroidism     Joint pain     lower back    Nocturia     Pacemaker     [facility]    Prostate cancer (Multi)     Rectal pain     Second degree AV block     Shortness of breath     use of inhaler if needed    Sleep apnea     uses BiPAP    SSS (sick sinus syndrome) (Multi)     Syncope     Urgency incontinence     Urinary tract infection     Ventricular tachycardia (Multi)     Vertigo     Wears glasses    [2]   Social History  Tobacco Use    Smoking status: Never    Smokeless tobacco: Never   Vaping Use    Vaping status: Never Used   Substance Use Topics    Alcohol use: Not Currently     Comment: rare, New Years    Drug use: Never   [3]   Family History  Problem Relation Name Age of Onset    Other (acute myocardial infarction) Mother      Coronary artery disease Mother      Diabetes Mother      Other (ptca) Mother      Coronary artery disease Father      Other (cva) Sister     [4]   Allergies  Allergen Reactions    Barium Iodide Rash    Pine Tar Itching and Rash

## 2025-04-30 NOTE — PATIENT INSTRUCTIONS
Patient to follow up in with Dr. Slim Jiang MD  in 1 year  Echocardiogram to be done soon and call results  You are being referred to Dr. Garcia Becerra for chronic venous insufficiency    No other changes today.   Continue same medications and treatments.   Patient educated on proper medication use.   Patient educated on risk factor modification.   Please bring any lab results from other providers / physicians to your next appointment.     Please bring all medicines, vitamins, and herbal supplements with you when you come to the office.     Prescriptions will not be filled unless you are compliant with your follow up appointments or have a follow up appointment scheduled as per instruction of your physician. Refills should be requested at the time of your visit.    ICipriano RN am scribing for and in the presence of Dr. Slim Jiang MD

## 2025-05-28 ENCOUNTER — HOSPITAL ENCOUNTER (OUTPATIENT)
Dept: CARDIOLOGY | Facility: CLINIC | Age: 80
Discharge: HOME | End: 2025-05-28
Payer: MEDICARE

## 2025-05-28 ENCOUNTER — TELEPHONE (OUTPATIENT)
Dept: CARDIOLOGY | Facility: CLINIC | Age: 80
End: 2025-05-28

## 2025-05-28 DIAGNOSIS — I10 BENIGN ESSENTIAL HYPERTENSION: ICD-10-CM

## 2025-05-28 DIAGNOSIS — R06.02 SHORTNESS OF BREATH: ICD-10-CM

## 2025-05-28 DIAGNOSIS — E78.2 MIXED HYPERLIPIDEMIA: ICD-10-CM

## 2025-05-28 DIAGNOSIS — R07.9 CHEST PAIN, UNSPECIFIED TYPE: ICD-10-CM

## 2025-05-28 LAB
AORTIC VALVE MEAN GRADIENT: 5 MMHG
AORTIC VALVE PEAK VELOCITY: 1.46 M/S
AV PEAK GRADIENT: 9 MMHG
AVA (PEAK VEL): 1.39 CM2
AVA (VTI): 1.56 CM2
EJECTION FRACTION APICAL 4 CHAMBER: 56.1
EJECTION FRACTION: 55 %
LEFT VENTRICLE INTERNAL DIMENSION DIASTOLE: 5.7 CM (ref 3.5–6)
LEFT VENTRICULAR OUTFLOW TRACT DIAMETER: 1.9 CM
LV EJECTION FRACTION BIPLANE: 54 %
MITRAL VALVE E/A RATIO: 1.01
MITRAL VALVE E/E' RATIO: 8.6
RIGHT VENTRICLE PEAK SYSTOLIC PRESSURE: 36.2 MMHG

## 2025-05-28 PROCEDURE — 93306 TTE W/DOPPLER COMPLETE: CPT | Performed by: INTERNAL MEDICINE

## 2025-05-28 PROCEDURE — 93306 TTE W/DOPPLER COMPLETE: CPT

## 2025-05-28 RX ORDER — AMLODIPINE BESYLATE 5 MG/1
5 TABLET ORAL DAILY
Qty: 90 TABLET | Refills: 3 | Status: SHIPPED | OUTPATIENT
Start: 2025-05-28 | End: 2026-05-28

## 2025-05-28 RX ORDER — PRAVASTATIN SODIUM 40 MG/1
40 TABLET ORAL DAILY
Qty: 90 TABLET | Refills: 3 | Status: SHIPPED | OUTPATIENT
Start: 2025-05-28 | End: 2026-05-28

## 2025-05-28 NOTE — TELEPHONE ENCOUNTER
Called and spoke to pt, advised results of Echocardiogram are ok, pt verbalized good understanding.

## 2025-05-28 NOTE — TELEPHONE ENCOUNTER
----- Message from Nurse Marcy JAMES sent at 5/28/2025  3:04 PM EDT -----    ----- Message -----  From: Slim Jiang MD  Sent: 5/28/2025   2:38 PM EDT  To: Marcy Alvarenga LPN    Echo ok  ----- Message -----  From: Interface, Syng[facility] - Cardiology Results In  Sent: 5/28/2025   2:04 PM EDT  To: Slim Jiang MD

## 2025-05-28 NOTE — TELEPHONE ENCOUNTER
Received request for prescription refill for patient.  Patient follows with Dr. Slim Jiang MD     Request is for amlodipine and pravastatin   Is patient currently on medication- yes    Last OV- 4/30/25  Next OV- 4/29/26    Pended for signing and sent to provider.

## 2025-07-24 ENCOUNTER — HOSPITAL ENCOUNTER (OUTPATIENT)
Dept: CARDIOLOGY | Facility: HOSPITAL | Age: 80
Discharge: HOME | End: 2025-07-24
Payer: MEDICARE

## 2025-07-24 DIAGNOSIS — I49.5 SSS (SICK SINUS SYNDROME) (MULTI): ICD-10-CM

## 2025-07-24 DIAGNOSIS — Z95.0 CARDIAC PACEMAKER IN SITU: ICD-10-CM

## 2025-07-24 PROCEDURE — 93296 REM INTERROG EVL PM/IDS: CPT

## 2025-07-24 PROCEDURE — 93294 REM INTERROG EVL PM/LDLS PM: CPT | Performed by: INTERNAL MEDICINE

## 2025-10-22 ENCOUNTER — APPOINTMENT (OUTPATIENT)
Dept: CARDIOLOGY | Facility: CLINIC | Age: 80
End: 2025-10-22
Payer: MEDICARE

## 2026-04-29 ENCOUNTER — APPOINTMENT (OUTPATIENT)
Dept: CARDIOLOGY | Facility: CLINIC | Age: 81
End: 2026-04-29
Payer: MEDICARE

## (undated) DEVICE — GLOVE, PROTEXIS PI CLASSIC, SZ-8.0, PF, PF, LF

## (undated) DEVICE — Device

## (undated) DEVICE — GOWN, SURGICAL, ROYAL SILK, XL, STERILE

## (undated) DEVICE — BAG, DRAINAGE, ANTI-REFLUX CHAMBER, 2000ML

## (undated) DEVICE — STRAP, ARM BOARD, 32 X 1.5

## (undated) DEVICE — DRAPE, TIBURON, LITHOTOMY, W/FLUID CONTROL POUCH

## (undated) DEVICE — GUIDWIRE, NITINOL, 0.35  150CM, STRAIGHT TIP, LF

## (undated) DEVICE — SOLUTION, IRRIGATION, USP, STERILE WATER, 1000 ML, BAG

## (undated) DEVICE — SET, DILATOR, URETHRAL, 8.0 - 24.0 FR, 37CM

## (undated) DEVICE — HOLSTER, ELECTROSURGERY ACCESSORY, STERILE

## (undated) DEVICE — COVER, FOOTSWITCH, PEDAL, WIDE

## (undated) DEVICE — TRAY, SKIN SCRUB, WET PREP, WITH 4 COMPARMENT

## (undated) DEVICE — TUBING, SUCTION, 6MM X 10, CLEAN N-COND

## (undated) DEVICE — CATHETER, URETHRAL, FOLEY, 2 WAY, 20 FR, 5 CC, SILICONE

## (undated) DEVICE — CATHETER, URETHRAL, FOLEY, 2 WAY, CARSON, COUDE TIP, 16 FR, 5 CC